# Patient Record
Sex: FEMALE | Race: WHITE | Employment: OTHER | URBAN - METROPOLITAN AREA
[De-identification: names, ages, dates, MRNs, and addresses within clinical notes are randomized per-mention and may not be internally consistent; named-entity substitution may affect disease eponyms.]

---

## 2017-05-16 ENCOUNTER — LAB ONLY (OUTPATIENT)
Dept: INTERNAL MEDICINE CLINIC | Age: 62
End: 2017-05-16

## 2017-05-16 DIAGNOSIS — Z79.4 TYPE 2 DIABETES MELLITUS WITHOUT COMPLICATION, WITH LONG-TERM CURRENT USE OF INSULIN (HCC): Primary | ICD-10-CM

## 2017-05-16 DIAGNOSIS — E11.9 TYPE 2 DIABETES MELLITUS WITHOUT COMPLICATION, WITH LONG-TERM CURRENT USE OF INSULIN (HCC): Primary | ICD-10-CM

## 2017-05-17 LAB
ALT SERPL-CCNC: 10 U/L (ref 5–40)
ANION GAP SERPL CALC-SCNC: 16 MMOL/L
AST SERPL W P-5'-P-CCNC: 10 U/L (ref 10–37)
AVG GLU, 10930: 179 MG/DL (ref 91–123)
BUN SERPL-MCNC: 21 MG/DL (ref 6–22)
CALCIUM SERPL-MCNC: 9.3 MG/DL (ref 8.4–10.5)
CHLORIDE SERPL-SCNC: 98 MMOL/L (ref 98–110)
CHOLEST SERPL-MCNC: 181 MG/DL (ref 110–200)
CO2 SERPL-SCNC: 23 MMOL/L (ref 20–32)
CREAT SERPL-MCNC: 1.1 MG/DL (ref 0.8–1.4)
GFRAA, 66117: 58.3
GFRNA, 66118: 48.1
GLUCOSE SERPL-MCNC: 153 MG/DL (ref 65–99)
HBA1C MFR BLD HPLC: 7.9 % (ref 4.8–5.9)
HDLC SERPL-MCNC: 35 MG/DL (ref 40–59)
LDLC SERPL CALC-MCNC: 120 MG/DL (ref 50–99)
POTASSIUM SERPL-SCNC: 4.8 MMOL/L (ref 3.5–5.5)
SODIUM SERPL-SCNC: 137 MMOL/L (ref 133–145)
TRIGL SERPL-MCNC: 131 MG/DL (ref 40–149)
VLDLC SERPL CALC-MCNC: 26 MG/DL (ref 8–30)

## 2017-05-18 ENCOUNTER — OFFICE VISIT (OUTPATIENT)
Dept: INTERNAL MEDICINE CLINIC | Age: 62
End: 2017-05-18

## 2017-05-18 VITALS
RESPIRATION RATE: 16 BRPM | BODY MASS INDEX: 40.22 KG/M2 | TEMPERATURE: 98.3 F | WEIGHT: 227 LBS | HEIGHT: 63 IN | HEART RATE: 58 BPM | SYSTOLIC BLOOD PRESSURE: 130 MMHG | DIASTOLIC BLOOD PRESSURE: 80 MMHG | OXYGEN SATURATION: 97 %

## 2017-05-18 DIAGNOSIS — Z00.00 ROUTINE GENERAL MEDICAL EXAMINATION AT A HEALTH CARE FACILITY: Primary | ICD-10-CM

## 2017-05-18 DIAGNOSIS — E11.9 TYPE 2 DIABETES MELLITUS WITHOUT COMPLICATION, WITH LONG-TERM CURRENT USE OF INSULIN (HCC): ICD-10-CM

## 2017-05-18 DIAGNOSIS — Z79.4 TYPE 2 DIABETES MELLITUS WITHOUT COMPLICATION, WITH LONG-TERM CURRENT USE OF INSULIN (HCC): ICD-10-CM

## 2017-05-18 DIAGNOSIS — E03.9 ACQUIRED HYPOTHYROIDISM: ICD-10-CM

## 2017-05-18 DIAGNOSIS — I10 ESSENTIAL HYPERTENSION, BENIGN: ICD-10-CM

## 2017-05-18 RX ORDER — TRAMADOL HYDROCHLORIDE 50 MG/1
TABLET ORAL
Qty: 90 TAB | Refills: 3 | Status: SHIPPED | OUTPATIENT
Start: 2017-05-18 | End: 2020-08-06

## 2017-05-18 NOTE — PROGRESS NOTES
1. Have you been to the ER, urgent care clinic since your last visit? Hospitalized since your last visit? No    2. Have you seen or consulted any other health care providers outside of the [address] [place] [address] since your last visit? Include any pap smears or colon screening.  No

## 2017-05-18 NOTE — PATIENT INSTRUCTIONS

## 2017-05-18 NOTE — PROGRESS NOTES
HPI:   Routine check up  Hx notable for HTN, T2DM, hypothyroidism  Recent A1C/chol 7.9/181  w/o chest pain/abd. discomfort; no dyspnea, cough or increased pedal edema; denies constitutional complaints of fever, night sweats or wt loss; no evidence of GI/ hemorrhage; no polyuria/polydipsia. Activity is sedentary    ROS is otherwise negative. Past Medical History:   Diagnosis Date    Breast cyst 3/2013    rt breast    Cholelithiasis     Diabetes ([facility] [address]     Endometriosis     Esophagitis, unspecified     Generalized osteoarthrosis, involving multiple sites     Gross hematuria     Hematuria, unspecified     Hypertension     Incisional hernia without mention of obstruction or gangrene     Morbid obesity ([address]     Pneumonia     Unspecified hypothyroidism     Unspecified venous (peripheral) insufficiency     Urolithiasis     Urolithiasis        Past Surgical History:   Procedure Laterality Date    BIOPSY OF BREAST, INCISIONAL  Jan 2012    right breast    HX COLONOSCOPY  4/2005    (vidhya) neg    HX HERNIA REPAIR Right     abdominal hernia, right side    HX HYSTERECTOMY  1994    HX OTHER SURGICAL      pilonidal cyst excision x 2       Social History     Social History    Marital status:      Spouse name: N/A    Number of children: N/A    Years of education: N/A     Occupational History    Not on file.      Social History Main Topics    Smoking status: Former Smoker     Types: Cigarettes     Quit date: 1/1/2013    Smokeless tobacco: Not on file    Alcohol use Yes      Comment: occasional    Drug use: No    Sexual activity: Not on file     Other Topics Concern    Not on file     Social History Narrative       Allergies   Allergen Reactions    Metformin Nausea Only       Family History   Problem Relation Age of Onset    Lung Disease Mother     Heart Attack Mother     Prostate Cancer Father 80    Heart Disease Other        Current Outpatient Prescriptions   Medication Sig Dispense Refill    guaiFENesin-codeine (CHERATUSSIN AC)  mg/5 mL solution 1 or 2 tsp q 6 hrs prn cough 120 mL 1    canagliflozin (INVOKANA) 100 mg tablet Take 1 Tab by mouth Daily (before breakfast). 30 Tab 11    insulin glargine (LANTUS) 100 unit/mL injection 60 units BID 1 Vial 3    amLODIPine-benazepril (LOTREL) 5-20 mg per capsule 2 qd 180 Cap 2    atenolol (TENORMIN) 100 mg tablet 1 qd 90 Tab 3    levothyroxine (SYNTHROID) 175 mcg tablet 1 qd 90 Tab 3    naproxen sodium (ANAPROX DS) 550 mg tablet 1 bid with food prn pain 180 Tab 3    sitaGLIPtin (JANUVIA) 100 mg tablet 1 qd 90 Tab 3    traMADol (ULTRAM) 50 mg tablet 1 tid prn pain 90 Tab 3    glipiZIDE SR (GLUCOTROL) 10 mg CR tablet 2 po qam 180 Tab 3    hydrochlorothiazide (HYDRODIURIL) 25 mg tablet 1 qam 90 Tab 3    ranitidine (ZANTAC) 300 mg tablet Take 300 mg by mouth daily. Visit Vitals    /80 (BP 1 Location: Left arm, BP Patient Position: Sitting)    Pulse (!) 58    Temp 98.3 °F (36.8 °C) (Tympanic)    Resp 16    Ht 5' 3\" (1.6 m)    Wt 227 lb (103 kg)    SpO2 97%    BMI 40.21 kg/m2       PE  Well nourished in NAD  HEENT:   OP: clear. Neck: supple w/o mass or bruits. Chest: clear. CV: RRR w/o m,r,g; pulses intact. Abd: soft, NT, w/o HSM. +Large abdominal hernia (R); smaller one (L)   Ext: tr edema. Diabetic foot exam:     Left: Reflexes 1+     Vibratory sensation normal    Proprioception normal   Sharp/dull discrimination normal    Filament test normal sensation with micro filament   Pulse DP: 2+ (normal)   Pulse PT: 2+ (normal)   Deformities: None  Right: Reflexes 1+   Vibratory sensation normal   Proprioception normal   Sharp/dull discrimination normal   Filament test normal sensation with micro filament   Pulse DP: 2+ (normal)   Pulse PT: 2+ (normal)   Deformities: None    Neuro: NF. Assessment and Plan    Encounter Diagnoses   Name Primary?     Routine general medical examination at a health care facility Yes  Type 2 diabetes mellitus without complication, with long-term current use of insulin (HCC)     Essential hypertension, benign     Acquired hypothyroidism    HTN - controlled  T2DM - continue dietary/exercise efforts  Eye exam advised; mammogram neg 10/2016; colo advised  I have reviewed/discussed the above normal BMI with the patient. I have recommended the following interventions: dietary management education, guidance, and counseling . Kendall Salinas     Hypothyroidism - continue replacement  No change in rx  OV 4 mos or prn  I have explained plan to patient and the patient verbalizes understanding

## 2017-10-19 ENCOUNTER — HOSPITAL ENCOUNTER (OUTPATIENT)
Dept: MAMMOGRAPHY | Age: 62
Discharge: HOME | End: 2017-10-19
Payer: COMMERCIAL

## 2017-10-19 DIAGNOSIS — Z12.31 VISIT FOR SCREENING MAMMOGRAM: ICD-10-CM

## 2017-10-19 PROCEDURE — 77067 SCR MAMMO BI INCL CAD: CPT

## 2017-12-12 ENCOUNTER — LAB ONLY (OUTPATIENT)
Dept: INTERNAL MEDICINE CLINIC | Age: 62
End: 2017-12-12

## 2017-12-12 DIAGNOSIS — E11.9 TYPE 2 DIABETES MELLITUS WITHOUT COMPLICATION, WITH LONG-TERM CURRENT USE OF INSULIN (HCC): ICD-10-CM

## 2017-12-12 DIAGNOSIS — I10 ESSENTIAL HYPERTENSION, BENIGN: ICD-10-CM

## 2017-12-12 DIAGNOSIS — Z79.4 TYPE 2 DIABETES MELLITUS WITHOUT COMPLICATION, WITH LONG-TERM CURRENT USE OF INSULIN (HCC): Primary | ICD-10-CM

## 2017-12-12 DIAGNOSIS — E66.01 MORBID OBESITY (HCC): ICD-10-CM

## 2017-12-12 DIAGNOSIS — E03.9 ACQUIRED HYPOTHYROIDISM: ICD-10-CM

## 2017-12-12 DIAGNOSIS — E11.9 TYPE 2 DIABETES MELLITUS WITHOUT COMPLICATION, WITH LONG-TERM CURRENT USE OF INSULIN (HCC): Primary | ICD-10-CM

## 2017-12-12 DIAGNOSIS — Z79.4 TYPE 2 DIABETES MELLITUS WITHOUT COMPLICATION, WITH LONG-TERM CURRENT USE OF INSULIN (HCC): ICD-10-CM

## 2017-12-13 LAB
A-G RATIO,AGRAT: 1.2 RATIO (ref 1.1–2.6)
ABSOLUTE LYMPHOCYTE COUNT, 10803: 2.1 K/UL (ref 1–4.8)
ALBUMIN SERPL-MCNC: 3.9 G/DL (ref 3.5–5)
ALP SERPL-CCNC: 78 U/L (ref 40–120)
ALT SERPL-CCNC: 9 U/L (ref 5–40)
ANION GAP SERPL CALC-SCNC: 17 MMOL/L
AST SERPL W P-5'-P-CCNC: 11 U/L (ref 10–37)
AVG GLU, 10930: 169 MG/DL (ref 91–123)
BASOPHILS # BLD: 0.1 K/UL (ref 0–0.2)
BASOPHILS NFR BLD: 1 % (ref 0–2)
BILIRUB SERPL-MCNC: 0.3 MG/DL (ref 0.2–1.2)
BUN SERPL-MCNC: 21 MG/DL (ref 6–22)
CALCIUM SERPL-MCNC: 9.3 MG/DL (ref 8.4–10.5)
CHLORIDE SERPL-SCNC: 98 MMOL/L (ref 98–110)
CHOLEST SERPL-MCNC: 173 MG/DL (ref 110–200)
CO2 SERPL-SCNC: 24 MMOL/L (ref 20–32)
CREAT SERPL-MCNC: 1 MG/DL (ref 0.8–1.4)
CREATININE, URINE: 99 MG/DL
EOSINOPHIL # BLD: 0.3 K/UL (ref 0–0.5)
EOSINOPHIL NFR BLD: 3 % (ref 0–6)
ERYTHROCYTE [DISTWIDTH] IN BLOOD BY AUTOMATED COUNT: 14.5 % (ref 10–16)
GFRAA, 66117: >60
GFRNA, 66118: 53.5
GLOBULIN,GLOB: 3.3 G/DL (ref 2–4)
GLUCOSE SERPL-MCNC: 164 MG/DL (ref 70–99)
GRANULOCYTES,GRANS: 69 % (ref 40–75)
HBA1C MFR BLD HPLC: 7.5 % (ref 4.8–5.9)
HCT VFR BLD AUTO: 47.7 % (ref 35.1–48)
HDLC SERPL-MCNC: 36 MG/DL (ref 40–59)
HGB BLD-MCNC: 14.7 G/DL (ref 11.7–16)
LDLC SERPL CALC-MCNC: 110 MG/DL (ref 50–99)
LYMPHOCYTES, LYMLT: 21 % (ref 27–45)
MCH RBC QN AUTO: 29 PG (ref 26–34)
MCHC RBC AUTO-ENTMCNC: 31 G/DL (ref 32–36)
MCV RBC AUTO: 95 FL (ref 80–95)
MICROALB/CREAT RATIO, 140286: 23.3 MCG/MG OF CREATININE (ref 0–30)
MICROALBUMIN,URINE RANDOM 140054: 23.1 UG/ML (ref 0.1–17)
MONOCYTES # BLD: 0.6 K/UL (ref 0.1–0.9)
MONOCYTES NFR BLD: 6 % (ref 3–9)
NEUTROPHILS # BLD AUTO: 6.6 K/UL (ref 1.8–7.7)
PLATELET # BLD AUTO: 354 K/UL (ref 140–440)
PMV BLD AUTO: 10 FL (ref 6–10.8)
POTASSIUM SERPL-SCNC: 4.9 MMOL/L (ref 3.5–5.5)
PROT SERPL-MCNC: 7.2 G/DL (ref 6.2–8.1)
RBC # BLD AUTO: 5.01 M/UL (ref 3.8–5.2)
SODIUM SERPL-SCNC: 139 MMOL/L (ref 133–145)
TRIGL SERPL-MCNC: 136 MG/DL (ref 40–149)
TSH SERPL DL<=0.005 MIU/L-ACNC: 0.03 MCU/ML (ref 0.27–4.2)
VLDLC SERPL CALC-MCNC: 27 MG/DL (ref 8–30)
WBC # BLD AUTO: 9.7 K/UL (ref 4–11)

## 2017-12-14 ENCOUNTER — OFFICE VISIT (OUTPATIENT)
Dept: INTERNAL MEDICINE CLINIC | Age: 62
End: 2017-12-14

## 2017-12-14 VITALS
RESPIRATION RATE: 16 BRPM | TEMPERATURE: 97.4 F | WEIGHT: 248 LBS | HEART RATE: 58 BPM | SYSTOLIC BLOOD PRESSURE: 138 MMHG | BODY MASS INDEX: 43.94 KG/M2 | HEIGHT: 63 IN | DIASTOLIC BLOOD PRESSURE: 80 MMHG | OXYGEN SATURATION: 98 %

## 2017-12-14 DIAGNOSIS — Z79.4 TYPE 2 DIABETES MELLITUS WITHOUT COMPLICATION, WITH LONG-TERM CURRENT USE OF INSULIN (HCC): Primary | ICD-10-CM

## 2017-12-14 DIAGNOSIS — E03.9 ACQUIRED HYPOTHYROIDISM: ICD-10-CM

## 2017-12-14 DIAGNOSIS — I10 ESSENTIAL HYPERTENSION, BENIGN: ICD-10-CM

## 2017-12-14 DIAGNOSIS — E11.9 TYPE 2 DIABETES MELLITUS WITHOUT COMPLICATION, WITH LONG-TERM CURRENT USE OF INSULIN (HCC): Primary | ICD-10-CM

## 2017-12-14 NOTE — PATIENT INSTRUCTIONS
Learning About Meal Planning for Diabetes  Why plan your meals? Meal planning can be a key part of managing diabetes. Planning meals and snacks with the right balance of carbohydrate, protein, and fat can help you keep your blood sugar at the target level you set with your doctor. You don't have to eat special foods. You can eat what your family eats, including sweets once in a while. But you do have to pay attention to how often you eat and how much you eat of certain foods. You may want to work with a dietitian or a certified diabetes educator. He or she can give you tips and meal ideas and can answer your questions about meal planning. This health professional can also help you reach a healthy weight if that is one of your goals. What plan is right for you? Your dietitian or diabetes educator may suggest that you start with the plate format or carbohydrate counting. The plate format  The plate format is a simple way to help you manage how you eat. You plan meals by learning how much space each food should take on a plate. Using the plate format helps you spread carbohydrate throughout the day. It can make it easier to keep your blood sugar level within your target range. It also helps you see if you're eating healthy portion sizes. To use the plate format, you put non-starchy vegetables on half your plate. Add meat or meat substitutes on one-quarter of the plate. Put a grain or starchy vegetable (such as brown rice or a potato) on the final quarter of the plate. You can add a small piece of fruit and some low-fat or fat-free milk or yogurt, depending on your carbohydrate goal for each meal.  Here are some tips for using the plate format:  · Make sure that you are not using an oversized plate. A 9-inch plate is best. Many restaurants use larger plates. · Get used to using the plate format at home. Then you can use it when you eat out. · Write down your questions about using the plate format.  Talk to your doctor, a dietitian, or a diabetes educator about your concerns. Carbohydrate counting  With carbohydrate counting, you plan meals based on the amount of carbohydrate in each food. Carbohydrate raises blood sugar higher and more quickly than any other nutrient. It is found in desserts, breads and cereals, and fruit. It's also found in starchy vegetables such as potatoes and corn, grains such as rice and pasta, and milk and yogurt. Spreading carbohydrate throughout the day helps keep your blood sugar levels within your target range. Your daily amount depends on several things, including your weight, how active you are, which diabetes medicines you take, and what your goals are for your blood sugar levels. A registered dietitian or diabetes educator can help you plan how much carbohydrate to include in each meal and snack. A guideline for your daily amount of carbohydrate is:  · 45 to 60 grams at each meal. That's about the same as 3 to 4 carbohydrate servings. · 15 to 20 grams at each snack. That's about the same as 1 carbohydrate serving. The Nutrition Facts label on packaged foods tells you how much carbohydrate is in a serving of the food. First, look at the serving size on the food label. Is that the amount you eat in a serving? All of the nutrition information on a food label is based on that serving size. So if you eat more or less than that, you'll need to adjust the other numbers. Total carbohydrate is the next thing you need to look for on the label. If you count carbohydrate servings, one serving of carbohydrate is 15 grams. For foods that don't come with labels, such as fresh fruits and vegetables, you'll need a guide that lists carbohydrate in these foods. Ask your doctor, dietitian, or diabetes educator about books or other nutrition guides you can use.   If you take insulin, you need to know how many grams of carbohydrate are in a meal. This lets you know how much rapid-acting insulin to take before you eat. If you use an insulin pump, you get a constant rate of insulin during the day. So the pump must be programmed at meals to give you extra insulin to cover the rise in blood sugar after meals. When you know how much carbohydrate you will eat, you can take the right amount of insulin. Or, if you always use the same amount of insulin, you need to make sure that you eat the same amount of carbohydrate at meals. If you need more help to understand carbohydrate counting and food labels, ask your doctor, dietitian, or diabetes educator. How do you get started with meal planning? Here are some tips to get started:  · Plan your meals a week at a time. Don't forget to include snacks too. · Use cookbooks or online recipes to plan several main meals. Plan some quick meals for busy nights. You also can double some recipes that freeze well. Then you can save half for other busy nights when you don't have time to cook. · Make sure you have the ingredients you need for your recipes. If you're running low on basic items, put these items on your shopping list too. · List foods that you use to make breakfasts, lunches, and snacks. List plenty of fruits and vegetables. · Post this list on the refrigerator. Add to it as you think of more things you need. · Take the list to the store to do your weekly shopping. Follow-up care is a key part of your treatment and safety. Be sure to make and go to all appointments, and call your doctor if you are having problems. It's also a good idea to know your test results and keep a list of the medicines you take. Where can you learn more? Go to http://essie-aura.info/. Babak Watson in the search box to learn more about \"Learning About Meal Planning for Diabetes. \"  Current as of: March 13, 2017  Content Version: 11.4  © 2171-4215 [employer].  Care instructions adapted under license by [facility] (which disclaims liability or warranty for this information). If you have questions about a medical condition or this instruction, always ask your healthcare professional. [address] any warranty or liability for your use of this information. Body Mass Index: Care Instructions  Your Care Instructions    Body mass index (BMI) can help you see if your weight is raising your risk for health problems. It uses a formula to compare how much you weigh with how tall you are. · A BMI lower than 18.5 is considered underweight. · A BMI between 18.5 and 24.9 is considered healthy. · A BMI between 25 and 29.9 is considered overweight. A BMI of 30 or higher is considered obese. If your BMI is in the normal range, it means that you have a lower risk for weight-related health problems. If your BMI is in the overweight or obese range, you may be at increased risk for weight-related health problems, such as high blood pressure, heart disease, stroke, arthritis or joint pain, and diabetes. If your BMI is in the underweight range, you may be at increased risk for health problems such as fatigue, lower protection (immunity) against illness, muscle loss, bone loss, hair loss, and hormone problems. BMI is just one measure of your risk for weight-related health problems. You may be at higher risk for health problems if you are not active, you eat an unhealthy diet, or you drink too much alcohol or use tobacco products. Follow-up care is a key part of your treatment and safety. Be sure to make and go to all appointments, and call your doctor if you are having problems. It's also a good idea to know your test results and keep a list of the medicines you take. How can you care for yourself at home? · Practice healthy eating habits. This includes eating plenty of fruits, vegetables, whole grains, lean protein, and low-fat dairy. · If your doctor recommends it, get more exercise. Walking is a good choice.  Bit by bit, increase the amount you walk every day. Try for at least 30 minutes on most days of the week. · Do not smoke. Smoking can increase your risk for health problems. If you need help quitting, talk to your doctor about stop-smoking programs and medicines. These can increase your chances of quitting for good. · Limit alcohol to 2 drinks a day for men and 1 drink a day for women. Too much alcohol can cause health problems. If you have a BMI higher than 25  · Your doctor may do other tests to check your risk for weight-related health problems. This may include measuring the distance around your waist. A waist measurement of more than 40 inches in men or 35 inches in women can increase the risk of weight-related health problems. · Talk with your doctor about steps you can take to stay healthy or improve your health. You may need to make lifestyle changes to lose weight and stay healthy, such as changing your diet and getting regular exercise. If you have a BMI lower than 18.5  · Your doctor may do other tests to check your risk for health problems. · Talk with your doctor about steps you can take to stay healthy or improve your health. You may need to make lifestyle changes to gain or maintain weight and stay healthy, such as getting more healthy foods in your diet and doing exercises to build muscle. Where can you learn more? Go to http://essie-aura.info/. Enter S176 in the search box to learn more about \"Body Mass Index: Care Instructions. \"  Current as of: October 13, 2016  Content Version: 11.4  © 9298-6156 [employer]. Care instructions adapted under license by [employer] (which disclaims liability or warranty for this information). If you have questions about a medical condition or this instruction, always ask your healthcare professional. [place] [address] any warranty or liability for your use of this information.

## 2017-12-14 NOTE — PROGRESS NOTES
HPI:   F/u visit for routine evaluation of HTN, T2DM, hypothyroidism  Recent A1C/chol/TSH 7.5/173/.03  w/o chest pain/abd. discomfort; no dyspnea, cough or increased pedal edema; denies constitutional complaints of fever, night sweats or wt loss; no evidence of GI/ hemorrhage; no polyuria/polydipsia. Activity is age appropriate. ROS is otherwise negative. Past Medical History:   Diagnosis Date    Breast cyst 3/2013    rt breast    Cholelithiasis     [facility] [address]     Endometriosis     Esophagitis, unspecified     Generalized osteoarthrosis, involving multiple sites     Gross hematuria     Hematuria, unspecified     Hypertension     Incisional hernia without mention of obstruction or gangrene     Morbid obesity ([facility] [address]     Pneumonia     Unspecified hypothyroidism     Unspecified venous (peripheral) insufficiency     Urolithiasis     Urolithiasis        Past Surgical History:   Procedure Laterality Date    BIOPSY OF BREAST, INCISIONAL  Jan 2012    right breast    HX COLONOSCOPY  4/2005    (vidhya) neg    HX HERNIA REPAIR Right     abdominal hernia, right side    HX HYSTERECTOMY  1994    HX OTHER SURGICAL      pilonidal cyst excision x 2       Social History     Social History    Marital status:      Spouse name: N/A    Number of children: N/A    Years of education: N/A     Occupational History    Not on file.      Social History Main Topics    Smoking status: Former Smoker     Types: Cigarettes     Quit date: 1/1/2013    Smokeless tobacco: Never Used    Alcohol use Yes      Comment: occasional    Drug use: No    Sexual activity: Not on file     Other Topics Concern    Not on file     Social History Narrative       Allergies   Allergen Reactions    Metformin Nausea Only       Family History   Problem Relation Age of Onset    Lung Disease Mother     Heart Attack Mother     Prostate Cancer Father 80    Heart Disease Other        Current Outpatient Prescriptions   Medication Sig Dispense Refill    traMADol (ULTRAM) 50 mg tablet 1 tid prn pain 90 Tab 3    guaiFENesin-codeine (CHERATUSSIN AC)  mg/5 mL solution 1 or 2 tsp q 6 hrs prn cough 120 mL 1    canagliflozin (INVOKANA) 100 mg tablet Take 1 Tab by mouth Daily (before breakfast). 30 Tab 11    insulin glargine (LANTUS) 100 unit/mL injection 60 units BID 1 Vial 3    amLODIPine-benazepril (LOTREL) 5-20 mg per capsule 2 qd 180 Cap 2    atenolol (TENORMIN) 100 mg tablet 1 qd 90 Tab 3    levothyroxine (SYNTHROID) 175 mcg tablet 1 qd 90 Tab 3    naproxen sodium (ANAPROX DS) 550 mg tablet 1 bid with food prn pain 180 Tab 3    sitaGLIPtin (JANUVIA) 100 mg tablet 1 qd 90 Tab 3    glipiZIDE SR (GLUCOTROL) 10 mg CR tablet 2 po qam 180 Tab 3    hydrochlorothiazide (HYDRODIURIL) 25 mg tablet 1 qam 90 Tab 3    ranitidine (ZANTAC) 300 mg tablet Take 300 mg by mouth daily. Visit Vitals    /80 (BP 1 Location: Left arm, BP Patient Position: Sitting)    Pulse (!) 58    Temp 97.4 °F (36.3 °C) (Tympanic)    Resp 16    Ht 5' 3\" (1.6 m)    Wt 248 lb (112.5 kg)    SpO2 98%    BMI 43.93 kg/m2       PE  Well nourished in NAD  HEENT: OP: clear. Neck: supple w/o mass or bruits. Chest: clear. CV: RRR w/o m,r,g; pulses intact. Abd: soft, NT, w/o HSM; hernias unchanged  Ext: tr edema. Neuro: NF. Assessment and Plan    Encounter Diagnoses   Name Primary?  Type 2 diabetes mellitus without complication, with long-term current use of insulin (HCC) Yes    Essential hypertension, benign     Acquired hypothyroidism    HTN - controlled  T2DM - continue dietary/exercise efforts  Hypothyroidism - on replacement  No change in rx  OV 4 mos or prn  I have explained plan to patient and the patient verbalizes understanding            Discussed the patient's BMI with her.   The BMI follow up plan is as follows:     dietary management education, guidance, and counseling  encourage exercise  monitor weight  prescribed dietary intake    An After Visit Summary was printed and given to the patient.

## 2018-03-05 ENCOUNTER — OFFICE VISIT (OUTPATIENT)
Dept: INTERNAL MEDICINE CLINIC | Age: 63
End: 2018-03-05

## 2018-03-05 VITALS
HEART RATE: 58 BPM | HEIGHT: 63 IN | DIASTOLIC BLOOD PRESSURE: 70 MMHG | RESPIRATION RATE: 16 BRPM | WEIGHT: 240 LBS | SYSTOLIC BLOOD PRESSURE: 130 MMHG | OXYGEN SATURATION: 98 % | TEMPERATURE: 98 F | BODY MASS INDEX: 42.52 KG/M2

## 2018-03-05 DIAGNOSIS — J06.9 ACUTE URI: Primary | ICD-10-CM

## 2018-03-05 DIAGNOSIS — R05.9 COUGH: ICD-10-CM

## 2018-03-05 RX ORDER — AZITHROMYCIN 250 MG/1
TABLET, FILM COATED ORAL
Qty: 6 TAB | Refills: 0 | Status: SHIPPED | OUTPATIENT
Start: 2018-03-05 | End: 2018-03-10

## 2018-03-05 RX ORDER — CODEINE PHOSPHATE AND GUAIFENESIN 10; 100 MG/5ML; MG/5ML
SOLUTION ORAL
Qty: 120 ML | Refills: 1 | Status: SHIPPED | OUTPATIENT
Start: 2018-03-05 | End: 2021-11-07

## 2018-03-05 NOTE — PATIENT INSTRUCTIONS
Upper Respiratory Infection (Cold): Care Instructions  Your Care Instructions    An upper respiratory infection, or URI, is an infection of the nose, sinuses, or throat. URIs are spread by coughs, sneezes, and direct contact. The common cold is the most frequent kind of URI. The flu and sinus infections are other kinds of URIs. Almost all URIs are caused by viruses. Antibiotics won't cure them. But you can treat most infections with home care. This may include drinking lots of fluids and taking over-the-counter pain medicine. You will probably feel better in 4 to 10 days. The doctor has checked you carefully, but problems can develop later. If you notice any problems or new symptoms, get medical treatment right away. Follow-up care is a key part of your treatment and safety. Be sure to make and go to all appointments, and call your doctor if you are having problems. It's also a good idea to know your test results and keep a list of the medicines you take. How can you care for yourself at home? · To prevent dehydration, drink plenty of fluids, enough so that your urine is light yellow or clear like water. Choose water and other caffeine-free clear liquids until you feel better. If you have kidney, heart, or liver disease and have to limit fluids, talk with your doctor before you increase the amount of fluids you drink. · Take an over-the-counter pain medicine, such as acetaminophen (Tylenol), ibuprofen (Advil, Motrin), or naproxen (Aleve). Read and follow all instructions on the label. · Before you use cough and cold medicines, check the label. These medicines may not be safe for young children or for people with certain health problems. · Be careful when taking over-the-counter cold or flu medicines and Tylenol at the same time. Many of these medicines have acetaminophen, which is Tylenol. Read the labels to make sure that you are not taking more than the recommended dose.  Too much acetaminophen (Tylenol) can be harmful. · Get plenty of rest.  · Do not smoke or allow others to smoke around you. If you need help quitting, talk to your doctor about stop-smoking programs and medicines. These can increase your chances of quitting for good. When should you call for help? Call 911 anytime you think you may need emergency care. For example, call if:  ? · You have severe trouble breathing. ?Call your doctor now or seek immediate medical care if:  ? · You seem to be getting much sicker. ? · You have new or worse trouble breathing. ? · You have a new or higher fever. ? · You have a new rash. ? Watch closely for changes in your health, and be sure to contact your doctor if:  ? · You have a new symptom, such as a sore throat, an earache, or sinus pain. ? · You cough more deeply or more often, especially if you notice more mucus or a change in the color of your mucus. ? · You do not get better as expected. Where can you learn more? Go to http://essie-aura.info/. Enter N518 in the search box to learn more about \"Upper Respiratory Infection (Cold): Care Instructions. \"  Current as of: May 12, 2017  Content Version: 11.4  © 1495-5603 [email or web address]. Care instructions adapted under license by [employer] (which disclaims liability or warranty for this information). If you have questions about a medical condition or this instruction, always ask your healthcare professional. [place] [address] any warranty or liability for your use of this information. Body Mass Index: Care Instructions  Your Care Instructions    Body mass index (BMI) can help you see if your weight is raising your risk for health problems. It uses a formula to compare how much you weigh with how tall you are. · A BMI lower than 18.5 is considered underweight. · A BMI between 18.5 and 24.9 is considered healthy. · A BMI between 25 and 29.9 is considered overweight.  A BMI of 30 or higher is considered obese. If your BMI is in the normal range, it means that you have a lower risk for weight-related health problems. If your BMI is in the overweight or obese range, you may be at increased risk for weight-related health problems, such as high blood pressure, heart disease, stroke, arthritis or joint pain, and diabetes. If your BMI is in the underweight range, you may be at increased risk for health problems such as fatigue, lower protection (immunity) against illness, muscle loss, bone loss, hair loss, and hormone problems. BMI is just one measure of your risk for weight-related health problems. You may be at higher risk for health problems if you are not active, you eat an unhealthy diet, or you drink too much alcohol or use tobacco products. Follow-up care is a key part of your treatment and safety. Be sure to make and go to all appointments, and call your doctor if you are having problems. It's also a good idea to know your test results and keep a list of the medicines you take. How can you care for yourself at home? · Practice healthy eating habits. This includes eating plenty of fruits, vegetables, whole grains, lean protein, and low-fat dairy. · If your doctor recommends it, get more exercise. Walking is a good choice. Bit by bit, increase the amount you walk every day. Try for at least 30 minutes on most days of the week. · Do not smoke. Smoking can increase your risk for health problems. If you need help quitting, talk to your doctor about stop-smoking programs and medicines. These can increase your chances of quitting for good. · Limit alcohol to 2 drinks a day for men and 1 drink a day for women. Too much alcohol can cause health problems. If you have a BMI higher than 25  · Your doctor may do other tests to check your risk for weight-related health problems.  This may include measuring the distance around your waist. A waist measurement of more than 40 inches in men or 35 inches in women can increase the risk of weight-related health problems. · Talk with your doctor about steps you can take to stay healthy or improve your health. You may need to make lifestyle changes to lose weight and stay healthy, such as changing your diet and getting regular exercise. If you have a BMI lower than 18.5  · Your doctor may do other tests to check your risk for health problems. · Talk with your doctor about steps you can take to stay healthy or improve your health. You may need to make lifestyle changes to gain or maintain weight and stay healthy, such as getting more healthy foods in your diet and doing exercises to build muscle. Where can you learn more? Go to http://essie-aura.info/. Enter S176 in the search box to learn more about \"Body Mass Index: Care Instructions. \"  Current as of: October 13, 2016  Content Version: 11.4  © 9710-5769 [employer], [employer]. Care instructions adapted under license by [employer] (which disclaims liability or warranty for this information). If you have questions about a medical condition or this instruction, always ask your healthcare professional. [address] any warranty or liability for your use of this information.

## 2018-03-05 NOTE — PROGRESS NOTES
HPI:   7 days of head congestion, facial discomfort, NP cough w/o fever, dyspnea, CP, or N      ROS is otherwise negative. Past Medical History:   Diagnosis Date    Breast cyst 3/2013    rt breast    Cholelithiasis     Diabetes ([facility] [address]     Endometriosis     Esophagitis, unspecified     Generalized osteoarthrosis, involving multiple sites     Gross hematuria     Hematuria, unspecified     Hypertension     Incisional hernia without mention of obstruction or gangrene     Morbid obesity ([facility] [address]     Pneumonia     Unspecified hypothyroidism     Unspecified venous (peripheral) insufficiency     Urolithiasis     Urolithiasis        Past Surgical History:   Procedure Laterality Date    BIOPSY OF BREAST, INCISIONAL  Jan 2012    right breast    HX COLONOSCOPY  4/2005    (vidhya) neg    HX HERNIA REPAIR Right     abdominal hernia, right side    HX HYSTERECTOMY  1994    HX OTHER SURGICAL      pilonidal cyst excision x 2       Social History     Social History    Marital status:      Spouse name: N/A    Number of children: N/A    Years of education: N/A     Occupational History    Not on file.      Social History Main Topics    Smoking status: Former Smoker     Types: Cigarettes     Quit date: 1/1/2013    Smokeless tobacco: Never Used    Alcohol use Yes      Comment: occasional    Drug use: No    Sexual activity: Not on file     Other Topics Concern    Not on file     Social History Narrative       Allergies   Allergen Reactions    Metformin Nausea Only       Family History   Problem Relation Age of Onset    Lung Disease Mother     Heart Attack Mother     Prostate Cancer Father 80    Heart Disease Other        Current Outpatient Prescriptions   Medication Sig Dispense Refill    traMADol (ULTRAM) 50 mg tablet 1 tid prn pain 90 Tab 3    guaiFENesin-codeine (CHERATUSSIN AC)  mg/5 mL solution 1 or 2 tsp q 6 hrs prn cough 120 mL 1    canagliflozin (INVOKANA) 100 mg tablet Take 1 Tab by mouth Daily (before breakfast). 30 Tab 11    insulin glargine (LANTUS) 100 unit/mL injection 60 units BID 1 Vial 3    amLODIPine-benazepril (LOTREL) 5-20 mg per capsule 2 qd 180 Cap 2    atenolol (TENORMIN) 100 mg tablet 1 qd 90 Tab 3    levothyroxine (SYNTHROID) 175 mcg tablet 1 qd 90 Tab 3    naproxen sodium (ANAPROX DS) 550 mg tablet 1 bid with food prn pain 180 Tab 3    sitaGLIPtin (JANUVIA) 100 mg tablet 1 qd 90 Tab 3    glipiZIDE SR (GLUCOTROL) 10 mg CR tablet 2 po qam 180 Tab 3    hydrochlorothiazide (HYDRODIURIL) 25 mg tablet 1 qam 90 Tab 3    ranitidine (ZANTAC) 300 mg tablet Take 300 mg by mouth daily. Visit Vitals    /70 (BP 1 Location: Left arm, BP Patient Position: Sitting)    Pulse (!) 58    Temp 98 °F (36.7 °C) (Tympanic)    Resp 16    Ht 5' 3\" (1.6 m)    Wt 240 lb (108.9 kg)    SpO2 98%    BMI 42.51 kg/m2       PE  Well nourished in NAD  HEENT:  OP: clear. TMS: clear  Neck: supple w/o mass or bruits. Chest: clear. CV: RRR w/o m,r,g; pulses intact. Abd: soft, NT, w/o HSM or mass; multiple hernias present  Ext: tr edema. Neuro: NF. Assessment and Plan    Encounter Diagnoses   Name Primary?  Acute URI Yes    Cough    sinusitis/ cough  zpak  zyrtec/rayshawn AC prn  F/U worsening or unimproved 7 days  T2DM - f/u 4/2018 or prn  I have explained plan to patient and the patient verbalizes understanding        Discussed the patient's BMI with her. The BMI follow up plan is as follows:     dietary management education, guidance, and counseling  encourage exercise  monitor weight  prescribed dietary intake    An After Visit Summary was printed and given to the patient.

## 2018-03-05 NOTE — PROGRESS NOTES
1. Have you been to the ER, urgent care clinic since your last visit? Hospitalized since your last visit? No    2. Have you seen or consulted any other health care providers outside of the [address] [place] [address] since your last visit? Include any pap smears or colon screening.  No

## 2018-07-25 RX ORDER — AMLODIPINE AND BENAZEPRIL HYDROCHLORIDE 5; 20 MG/1; MG/1
CAPSULE ORAL
Qty: 180 CAP | Refills: 2 | Status: SHIPPED | OUTPATIENT
Start: 2018-07-25 | End: 2019-04-30 | Stop reason: SDUPTHER

## 2018-07-25 NOTE — TELEPHONE ENCOUNTER
Requested Prescriptions     Pending Prescriptions Disp Refills    amLODIPine-benazepril (LOTREL) 5-20 mg per capsule 180 Cap 2     Si qd

## 2018-08-07 DIAGNOSIS — E11.9 TYPE 2 DIABETES MELLITUS WITHOUT COMPLICATION, WITH LONG-TERM CURRENT USE OF INSULIN (HCC): Primary | ICD-10-CM

## 2018-08-07 DIAGNOSIS — Z79.4 TYPE 2 DIABETES MELLITUS WITHOUT COMPLICATION, WITH LONG-TERM CURRENT USE OF INSULIN (HCC): Primary | ICD-10-CM

## 2018-08-08 DIAGNOSIS — E03.9 ACQUIRED HYPOTHYROIDISM: ICD-10-CM

## 2018-08-08 DIAGNOSIS — E11.9 TYPE 2 DIABETES MELLITUS WITHOUT COMPLICATION, WITH LONG-TERM CURRENT USE OF INSULIN (HCC): Primary | ICD-10-CM

## 2018-08-08 DIAGNOSIS — Z79.4 TYPE 2 DIABETES MELLITUS WITHOUT COMPLICATION, WITH LONG-TERM CURRENT USE OF INSULIN (HCC): Primary | ICD-10-CM

## 2018-08-09 ENCOUNTER — OFFICE VISIT (OUTPATIENT)
Dept: INTERNAL MEDICINE CLINIC | Age: 63
End: 2018-08-09

## 2018-08-09 VITALS
WEIGHT: 251 LBS | RESPIRATION RATE: 15 BRPM | TEMPERATURE: 98 F | OXYGEN SATURATION: 98 % | BODY MASS INDEX: 44.47 KG/M2 | HEART RATE: 75 BPM | HEIGHT: 63 IN | SYSTOLIC BLOOD PRESSURE: 130 MMHG | DIASTOLIC BLOOD PRESSURE: 80 MMHG

## 2018-08-09 DIAGNOSIS — E03.9 ACQUIRED HYPOTHYROIDISM: ICD-10-CM

## 2018-08-09 DIAGNOSIS — E11.9 TYPE 2 DIABETES MELLITUS WITHOUT COMPLICATION, WITH LONG-TERM CURRENT USE OF INSULIN (HCC): Primary | ICD-10-CM

## 2018-08-09 DIAGNOSIS — Z79.4 TYPE 2 DIABETES MELLITUS WITHOUT COMPLICATION, WITH LONG-TERM CURRENT USE OF INSULIN (HCC): Primary | ICD-10-CM

## 2018-08-09 DIAGNOSIS — I10 ESSENTIAL HYPERTENSION, BENIGN: ICD-10-CM

## 2018-08-09 DIAGNOSIS — E78.2 MIXED HYPERLIPIDEMIA: ICD-10-CM

## 2018-08-09 LAB
ANION GAP SERPL CALC-SCNC: 17 MMOL/L
AVG GLU, 10930: 176 MG/DL (ref 91–123)
BUN SERPL-MCNC: 18 MG/DL (ref 6–22)
CALCIUM SERPL-MCNC: 8.9 MG/DL (ref 8.4–10.5)
CHLORIDE SERPL-SCNC: 98 MMOL/L (ref 98–110)
CHOLEST SERPL-MCNC: 168 MG/DL (ref 110–200)
CO2 SERPL-SCNC: 26 MMOL/L (ref 20–32)
CREAT SERPL-MCNC: 1 MG/DL (ref 0.8–1.4)
GFRAA, 66117: >60
GFRNA, 66118: 53.3
GLUCOSE SERPL-MCNC: 129 MG/DL (ref 70–99)
HBA1C MFR BLD HPLC: 7.8 % (ref 4.8–5.9)
HDLC SERPL-MCNC: 35 MG/DL (ref 40–59)
HDLC SERPL-MCNC: 4.8 MG/DL (ref 0–5)
LDLC SERPL CALC-MCNC: 113 MG/DL (ref 50–99)
POTASSIUM SERPL-SCNC: 4.4 MMOL/L (ref 3.5–5.5)
SODIUM SERPL-SCNC: 141 MMOL/L (ref 133–145)
TRIGL SERPL-MCNC: 97 MG/DL (ref 40–149)
TSH SERPL DL<=0.005 MIU/L-ACNC: 0.15 MCU/ML (ref 0.27–4.2)
VLDLC SERPL CALC-MCNC: 19 MG/DL (ref 8–30)

## 2018-08-09 NOTE — PROGRESS NOTES
HPI:   F/u visit for routine evaluation of HTN, T2DM, hyperlipidemia, hypothyroidism  A1C/chol/TSH done recently 7.8/168/.15  w/o chest pain/abd. discomfort; no dyspnea, cough or increased pedal edema; denies constitutional complaints of fever, night sweats or wt loss; no evidence of GI/ hemorrhage. Activity is age appropriate. ROS is otherwise negative. Past Medical History:   Diagnosis Date    Breast cyst 3/2013    rt breast    Cholelithiasis     [facility] [address]     Endometriosis     Esophagitis, unspecified     Generalized osteoarthrosis, involving multiple sites     Gross hematuria     Hematuria, unspecified     Hypertension     Incisional hernia without mention of obstruction or gangrene     Morbid obesity ([facility] [address]     Pneumonia     Unspecified hypothyroidism     Unspecified venous (peripheral) insufficiency     Urolithiasis     Urolithiasis        Past Surgical History:   Procedure Laterality Date    BIOPSY OF BREAST, INCISIONAL  Jan 2012    right breast    HX COLONOSCOPY  4/2005    (vidhya) neg    HX HERNIA REPAIR Right     abdominal hernia, right side    HX HYSTERECTOMY  1994    HX OTHER SURGICAL      pilonidal cyst excision x 2       Social History     Social History    Marital status:      Spouse name: N/A    Number of children: N/A    Years of education: N/A     Occupational History    Not on file.      Social History Main Topics    Smoking status: Former Smoker     Types: Cigarettes     Quit date: 1/1/2013    Smokeless tobacco: Never Used    Alcohol use Yes      Comment: occasional    Drug use: No    Sexual activity: Not on file     Other Topics Concern    Not on file     Social History Narrative       Allergies   Allergen Reactions    Metformin Nausea Only       Family History   Problem Relation Age of Onset    Lung Disease Mother     Heart Attack Mother     Prostate Cancer Father 80    Heart Disease Other        Current Outpatient Prescriptions   Medication Sig Dispense Refill    canagliflozin (INVOKANA) 100 mg tablet Take 1 Tab by mouth Daily (before breakfast). 90 Tab 3    amLODIPine-benazepril (LOTREL) 5-20 mg per capsule 2 qd 180 Cap 2    guaiFENesin-codeine (CHERATUSSIN AC) 100-10 mg/5 mL solution 1 or 2 tsp q 6 hrs prn cough 120 mL 1    traMADol (ULTRAM) 50 mg tablet 1 tid prn pain 90 Tab 3    insulin glargine (LANTUS) 100 unit/mL injection 60 units BID 1 Vial 3    atenolol (TENORMIN) 100 mg tablet 1 qd 90 Tab 3    levothyroxine (SYNTHROID) 175 mcg tablet 1 qd 90 Tab 3    naproxen sodium (ANAPROX DS) 550 mg tablet 1 bid with food prn pain 180 Tab 3    sitaGLIPtin (JANUVIA) 100 mg tablet 1 qd 90 Tab 3    glipiZIDE SR (GLUCOTROL) 10 mg CR tablet 2 po qam 180 Tab 3    hydrochlorothiazide (HYDRODIURIL) 25 mg tablet 1 qam 90 Tab 3    ranitidine (ZANTAC) 300 mg tablet Take 300 mg by mouth daily. Visit Vitals    /80    Pulse 75    Temp 98 °F (36.7 °C)    Resp 15    Ht 5' 3\" (1.6 m)    Wt 251 lb (113.9 kg)    SpO2 98%    BMI 44.46 kg/m2       PE  Well nourished in NAD  HEENT:   OP: clear. Neck: supple w/o mass or bruits. Chest: clear. CV: RRR w/o m,r,g; pulses intact. Abd: soft, NT, w/o HSM; multiple abd wall hernias  Ext: tr edema. Neuro: NF. Assessment and Plan    Encounter Diagnoses   Name Primary?  Type 2 diabetes mellitus without complication, with long-term current use of insulin (HCC) Yes    Essential hypertension, benign     Acquired hypothyroidism     Mixed hyperlipidemia    HTN - controlled  T2DM/hyperlipidemia - continue dietary/exercise efforts; declines statin rx  Hypothyroidism - on replacement  Otherwise no change in rx  OV 4 mos or prn  I have explained plan to patient and the patient verbalizes understanding              Discussed the patient's BMI with her.   The BMI follow up plan is as follows:     dietary management education, guidance, and counseling  encourage exercise  monitor weight  prescribed dietary intake    An After Visit Summary was printed and given to the patient.

## 2018-08-09 NOTE — PATIENT INSTRUCTIONS
Learning About Meal Planning for Diabetes  Why plan your meals? Meal planning can be a key part of managing diabetes. Planning meals and snacks with the right balance of carbohydrate, protein, and fat can help you keep your blood sugar at the target level you set with your doctor. You don't have to eat special foods. You can eat what your family eats, including sweets once in a while. But you do have to pay attention to how often you eat and how much you eat of certain foods. You may want to work with a dietitian or a certified diabetes educator. He or she can give you tips and meal ideas and can answer your questions about meal planning. This health professional can also help you reach a healthy weight if that is one of your goals. What plan is right for you? Your dietitian or diabetes educator may suggest that you start with the plate format or carbohydrate counting. The plate format  The plate format is a simple way to help you manage how you eat. You plan meals by learning how much space each food should take on a plate. Using the plate format helps you spread carbohydrate throughout the day. It can make it easier to keep your blood sugar level within your target range. It also helps you see if you're eating healthy portion sizes. To use the plate format, you put non-starchy vegetables on half your plate. Add meat or meat substitutes on one-quarter of the plate. Put a grain or starchy vegetable (such as brown rice or a potato) on the final quarter of the plate. You can add a small piece of fruit and some low-fat or fat-free milk or yogurt, depending on your carbohydrate goal for each meal.  Here are some tips for using the plate format:  · Make sure that you are not using an oversized plate. A 9-inch plate is best. Many restaurants use larger plates. · Get used to using the plate format at home. Then you can use it when you eat out. · Write down your questions about using the plate format.  Talk to your doctor, a dietitian, or a diabetes educator about your concerns. Carbohydrate counting  With carbohydrate counting, you plan meals based on the amount of carbohydrate in each food. Carbohydrate raises blood sugar higher and more quickly than any other nutrient. It is found in desserts, breads and cereals, and fruit. It's also found in starchy vegetables such as potatoes and corn, grains such as rice and pasta, and milk and yogurt. Spreading carbohydrate throughout the day helps keep your blood sugar levels within your target range. Your daily amount depends on several things, including your weight, how active you are, which diabetes medicines you take, and what your goals are for your blood sugar levels. A registered dietitian or diabetes educator can help you plan how much carbohydrate to include in each meal and snack. A guideline for your daily amount of carbohydrate is:  · 45 to 60 grams at each meal. That's about the same as 3 to 4 carbohydrate servings. · 15 to 20 grams at each snack. That's about the same as 1 carbohydrate serving. The Nutrition Facts label on packaged foods tells you how much carbohydrate is in a serving of the food. First, look at the serving size on the food label. Is that the amount you eat in a serving? All of the nutrition information on a food label is based on that serving size. So if you eat more or less than that, you'll need to adjust the other numbers. Total carbohydrate is the next thing you need to look for on the label. If you count carbohydrate servings, one serving of carbohydrate is 15 grams. For foods that don't come with labels, such as fresh fruits and vegetables, you'll need a guide that lists carbohydrate in these foods. Ask your doctor, dietitian, or diabetes educator about books or other nutrition guides you can use.   If you take insulin, you need to know how many grams of carbohydrate are in a meal. This lets you know how much rapid-acting insulin to take before you eat. If you use an insulin pump, you get a constant rate of insulin during the day. So the pump must be programmed at meals to give you extra insulin to cover the rise in blood sugar after meals. When you know how much carbohydrate you will eat, you can take the right amount of insulin. Or, if you always use the same amount of insulin, you need to make sure that you eat the same amount of carbohydrate at meals. If you need more help to understand carbohydrate counting and food labels, ask your doctor, dietitian, or diabetes educator. How do you get started with meal planning? Here are some tips to get started:  · Plan your meals a week at a time. Don't forget to include snacks too. · Use cookbooks or online recipes to plan several main meals. Plan some quick meals for busy nights. You also can double some recipes that freeze well. Then you can save half for other busy nights when you don't have time to cook. · Make sure you have the ingredients you need for your recipes. If you're running low on basic items, put these items on your shopping list too. · List foods that you use to make breakfasts, lunches, and snacks. List plenty of fruits and vegetables. · Post this list on the refrigerator. Add to it as you think of more things you need. · Take the list to the store to do your weekly shopping. Follow-up care is a key part of your treatment and safety. Be sure to make and go to all appointments, and call your doctor if you are having problems. It's also a good idea to know your test results and keep a list of the medicines you take. Where can you learn more? Go to http://essie-aura.info/. Remy Guerrero in the search box to learn more about \"Learning About Meal Planning for Diabetes. \"  Current as of: December 7, 2017  Content Version: 11.7  © 1073-5799 [employer].  Care instructions adapted under license by [employer] (which disclaims liability or warranty for this information). If you have questions about a medical condition or this instruction, always ask your healthcare professional. [address] any warranty or liability for your use of this information. Body Mass Index: Care Instructions  Your Care Instructions    Body mass index (BMI) can help you see if your weight is raising your risk for health problems. It uses a formula to compare how much you weigh with how tall you are. · A BMI lower than 18.5 is considered underweight. · A BMI between 18.5 and 24.9 is considered healthy. · A BMI between 25 and 29.9 is considered overweight. A BMI of 30 or higher is considered obese. If your BMI is in the normal range, it means that you have a lower risk for weight-related health problems. If your BMI is in the overweight or obese range, you may be at increased risk for weight-related health problems, such as high blood pressure, heart disease, stroke, arthritis or joint pain, and diabetes. If your BMI is in the underweight range, you may be at increased risk for health problems such as fatigue, lower protection (immunity) against illness, muscle loss, bone loss, hair loss, and hormone problems. BMI is just one measure of your risk for weight-related health problems. You may be at higher risk for health problems if you are not active, you eat an unhealthy diet, or you drink too much alcohol or use tobacco products. Follow-up care is a key part of your treatment and safety. Be sure to make and go to all appointments, and call your doctor if you are having problems. It's also a good idea to know your test results and keep a list of the medicines you take. How can you care for yourself at home? · Practice healthy eating habits. This includes eating plenty of fruits, vegetables, whole grains, lean protein, and low-fat dairy. · If your doctor recommends it, get more exercise. Walking is a good choice.  Bit by bit, increase the amount you walk every day. Try for at least 30 minutes on most days of the week. · Do not smoke. Smoking can increase your risk for health problems. If you need help quitting, talk to your doctor about stop-smoking programs and medicines. These can increase your chances of quitting for good. · Limit alcohol to 2 drinks a day for men and 1 drink a day for women. Too much alcohol can cause health problems. If you have a BMI higher than 25  · Your doctor may do other tests to check your risk for weight-related health problems. This may include measuring the distance around your waist. A waist measurement of more than 40 inches in men or 35 inches in women can increase the risk of weight-related health problems. · Talk with your doctor about steps you can take to stay healthy or improve your health. You may need to make lifestyle changes to lose weight and stay healthy, such as changing your diet and getting regular exercise. If you have a BMI lower than 18.5  · Your doctor may do other tests to check your risk for health problems. · Talk with your doctor about steps you can take to stay healthy or improve your health. You may need to make lifestyle changes to gain or maintain weight and stay healthy, such as getting more healthy foods in your diet and doing exercises to build muscle. Where can you learn more? Go to http://essie-aura.info/. Enter S176 in the search box to learn more about \"Body Mass Index: Care Instructions. \"  Current as of: October 13, 2016  Content Version: 11.4  © 1326-6536 [employer]. Care instructions adapted under license by [employer] (which disclaims liability or warranty for this information). If you have questions about a medical condition or this instruction, always ask your healthcare professional. [place] [address] any warranty or liability for your use of this information.

## 2018-08-21 RX ORDER — SITAGLIPTIN 100 MG/1
TABLET, FILM COATED ORAL
Qty: 90 TAB | Refills: 2 | Status: SHIPPED | OUTPATIENT
Start: 2018-08-21 | End: 2019-05-23 | Stop reason: SDUPTHER

## 2018-08-23 RX ORDER — HYDROCHLOROTHIAZIDE 25 MG/1
TABLET ORAL
Qty: 90 TAB | Refills: 2 | Status: SHIPPED | OUTPATIENT
Start: 2018-08-23 | End: 2019-04-30 | Stop reason: SDUPTHER

## 2018-08-29 RX ORDER — LEVOTHYROXINE SODIUM 175 UG/1
TABLET ORAL
Qty: 90 TAB | Refills: 2 | Status: SHIPPED | OUTPATIENT
Start: 2018-08-29 | End: 2019-06-12 | Stop reason: SDUPTHER

## 2018-09-07 RX ORDER — INSULIN GLARGINE 100 [IU]/ML
INJECTION, SOLUTION SUBCUTANEOUS
Qty: 11 VIAL | Refills: 3 | Status: SHIPPED | OUTPATIENT
Start: 2018-09-07 | End: 2019-09-11 | Stop reason: SDUPTHER

## 2018-11-16 RX ORDER — GLIPIZIDE 10 MG/1
TABLET, FILM COATED, EXTENDED RELEASE ORAL
Qty: 180 TAB | Refills: 3 | Status: SHIPPED | OUTPATIENT
Start: 2018-11-16 | End: 2019-11-07 | Stop reason: SDUPTHER

## 2018-12-02 NOTE — PROGRESS NOTES
1. Have you been to the ER, urgent care clinic since your last visit? Hospitalized since your last visit? No    2. Have you seen or consulted any other health care providers outside of the [address] [place] [address] since your last visit? Include any pap smears or colon screening.  No  used

## 2019-02-28 RX ORDER — ATENOLOL 100 MG/1
TABLET ORAL
Qty: 90 TAB | Refills: 3 | Status: SHIPPED | OUTPATIENT
Start: 2019-02-28 | End: 2020-03-01

## 2019-02-28 RX ORDER — NAPROXEN SODIUM 550 MG/1
TABLET ORAL
Qty: 60 TAB | Refills: 5 | Status: SHIPPED | OUTPATIENT
Start: 2019-02-28

## 2019-03-05 DIAGNOSIS — E03.9 ACQUIRED HYPOTHYROIDISM: ICD-10-CM

## 2019-03-05 DIAGNOSIS — E11.9 TYPE 2 DIABETES MELLITUS WITHOUT COMPLICATION, WITH LONG-TERM CURRENT USE OF INSULIN (HCC): Primary | ICD-10-CM

## 2019-03-05 DIAGNOSIS — Z79.4 TYPE 2 DIABETES MELLITUS WITHOUT COMPLICATION, WITH LONG-TERM CURRENT USE OF INSULIN (HCC): Primary | ICD-10-CM

## 2019-03-06 NOTE — PROGRESS NOTES
HPI:   Check up  F/u visit for routine evaluation of HTN, T2DM, hyperlipidemia, hypothyroidism  A1C/chol 7.8/168 with TSH of .15 August 2018  No acute issues  w/o chest pain/abd. discomfort; no dyspnea, cough or pedal edema; denies constitutional complaints of fever, night sweats; mild volitional wt loss; no evidence of GI/ hemorrhage    ROS is otherwise negative. Past Medical History:   Diagnosis Date    Breast cyst 3/2013    rt breast    Cholelithiasis     Diabetes ([facility] [address]     Endometriosis     Esophagitis, unspecified     Generalized osteoarthrosis, involving multiple sites     Gross hematuria     Hematuria, unspecified     Hypertension     Incisional hernia without mention of obstruction or gangrene     Morbid obesity ([facility] [address]     Pneumonia     Unspecified hypothyroidism     Unspecified venous (peripheral) insufficiency     Urolithiasis     Urolithiasis        Past Surgical History:   Procedure Laterality Date    BIOPSY OF BREAST, INCISIONAL  2012    right breast    HX COLONOSCOPY  2005    (vidhya) neg    HX HERNIA REPAIR Right     abdominal hernia, right side    HX HYSTERECTOMY      HX OTHER SURGICAL      pilonidal cyst excision x 2       Social History     Socioeconomic History    Marital status:      Spouse name: Not on file    Number of children: Not on file    Years of education: Not on file    Highest education level: Not on file   Social Needs    Financial resource strain: Not on file    Food insecurity - worry: Not on file    Food insecurity - inability: Not on file   Tallyfy needs - medical: Not on file   Tallyfy needs - non-medical: Not on file   Occupational History    Not on file   Tobacco Use    Smoking status: Former Smoker     Types: Cigarettes     Last attempt to quit: 2013     Years since quittin.1    Smokeless tobacco: Never Used   Substance and Sexual Activity    Alcohol use: Yes     Comment: occasional    Drug use:  No  Sexual activity: Not on file   Other Topics Concern    Not on file   Social History Narrative    Not on file       Allergies   Allergen Reactions    Metformin Nausea Only       Family History   Problem Relation Age of Onset    Lung Disease Mother     Heart Attack Mother     Prostate Cancer Father 80    Heart Disease Other        Current Outpatient Medications   Medication Sig Dispense Refill    atenolol (TENORMIN) 100 mg tablet TAKE 1 TABLET BY MOUTH ONCE DAILY 90 Tab 3    naproxen sodium (ANAPROX) 550 mg tablet TAKE 1 TABLET BY MOUTH TWICE DAILY AS NEEDED FOR PAIN 60 Tab 5    glipiZIDE SR (GLUCOTROL XL) 10 mg CR tablet TAKE TWO TABLETS BY MOUTH ONCE DAILY IN THE MORNING 180 Tab 3    insulin glargine (LANTUS U-100 INSULIN) 100 unit/mL injection 60 units BID 11 Vial 3    levothyroxine (SYNTHROID) 175 mcg tablet TAKE ONE TABLET BY MOUTH ONCE DAILY 90 Tab 2    hydroCHLOROthiazide (HYDRODIURIL) 25 mg tablet TAKE ONE TABLET BY MOUTH ONCE DAILY IN THE MORNING 90 Tab 2    JANUVIA 100 mg tablet TAKE ONE TABLET BY MOUTH ONCE DAILY 90 Tab 2    canagliflozin (INVOKANA) 100 mg tablet Take 1 Tab by mouth Daily (before breakfast). 90 Tab 3    amLODIPine-benazepril (LOTREL) 5-20 mg per capsule 2 qd 180 Cap 2    guaiFENesin-codeine (CHERATUSSIN AC) 100-10 mg/5 mL solution 1 or 2 tsp q 6 hrs prn cough 120 mL 1    traMADol (ULTRAM) 50 mg tablet 1 tid prn pain 90 Tab 3    ranitidine (ZANTAC) 300 mg tablet Take 300 mg by mouth daily. Visit Vitals  /80   Pulse 80   Temp 98 °F (36.7 °C)   Resp 15   Ht 5' 3\" (1.6 m)   BMI 44.46 kg/m²       PE  Well nourished in NAD  HEENT:   OP: clear. Neck: supple w/o mass or bruits. Chest: clear. CV: RRR w/o m,r,g; pulses intact. Abd: soft, NT, w/o HSM; multiple abd wall hernias  Ext: tr edema. Neuro: NF. Assessment and Plan    Encounter Diagnoses   Name Primary?     Routine general medical examination at a health care facility Yes    Type 2 diabetes mellitus without complication, with long-term current use of insulin ([address]     Essential hypertension, benign     Mixed hyperlipidemia     Acquired hypothyroidism        BP @ goal  Labs to assess metabolic parameters (D1BX/FYHOCMKNCXMKXE/HVBAVERCHCUCEH)  Up to date with eye exams  Continue dietary/exercise efforts; prevnar advised  No change in rx  OV 4 mos or prn  I have explained plan to patient and the patient verbalizes understanding      Discussed the patient's BMI with her. The BMI follow up plan is as follows:     dietary management education, guidance, and counseling  encourage exercise  monitor weight  prescribed dietary intake    An After Visit Summary was printed and given to the patient.

## 2019-03-07 ENCOUNTER — OFFICE VISIT (OUTPATIENT)
Dept: INTERNAL MEDICINE CLINIC | Age: 64
End: 2019-03-07

## 2019-03-07 VITALS
HEART RATE: 80 BPM | HEIGHT: 63 IN | BODY MASS INDEX: 43.23 KG/M2 | TEMPERATURE: 98 F | RESPIRATION RATE: 15 BRPM | WEIGHT: 244 LBS | SYSTOLIC BLOOD PRESSURE: 130 MMHG | DIASTOLIC BLOOD PRESSURE: 80 MMHG

## 2019-03-07 DIAGNOSIS — E03.9 ACQUIRED HYPOTHYROIDISM: ICD-10-CM

## 2019-03-07 DIAGNOSIS — E11.9 TYPE 2 DIABETES MELLITUS WITHOUT COMPLICATION, WITH LONG-TERM CURRENT USE OF INSULIN (HCC): ICD-10-CM

## 2019-03-07 DIAGNOSIS — Z79.4 TYPE 2 DIABETES MELLITUS WITHOUT COMPLICATION, WITH LONG-TERM CURRENT USE OF INSULIN (HCC): ICD-10-CM

## 2019-03-07 DIAGNOSIS — Z00.00 ROUTINE GENERAL MEDICAL EXAMINATION AT A HEALTH CARE FACILITY: Primary | ICD-10-CM

## 2019-03-07 DIAGNOSIS — E78.2 MIXED HYPERLIPIDEMIA: ICD-10-CM

## 2019-03-07 DIAGNOSIS — I10 ESSENTIAL HYPERTENSION, BENIGN: ICD-10-CM

## 2019-03-07 LAB
A-G RATIO,AGRAT: 1.5 RATIO (ref 1.1–2.6)
ABSOLUTE LYMPHOCYTE COUNT, 10803: 2.1 K/UL (ref 1–4.8)
ALBUMIN SERPL-MCNC: 4.3 G/DL (ref 3.5–5)
ALP SERPL-CCNC: 88 U/L (ref 40–120)
ALT SERPL-CCNC: 11 U/L (ref 5–40)
ANION GAP SERPL CALC-SCNC: 17 MMOL/L
AST SERPL W P-5'-P-CCNC: 6 U/L (ref 10–37)
AVG GLU, 10930: 167 MG/DL (ref 91–123)
BASOPHILS # BLD: 0.1 K/UL (ref 0–0.2)
BASOPHILS NFR BLD: 1 % (ref 0–2)
BILIRUB SERPL-MCNC: 0.5 MG/DL (ref 0.2–1.2)
BUN SERPL-MCNC: 20 MG/DL (ref 6–22)
CALCIUM SERPL-MCNC: 9.4 MG/DL (ref 8.4–10.5)
CHLORIDE SERPL-SCNC: 96 MMOL/L (ref 98–110)
CHOLEST SERPL-MCNC: 182 MG/DL (ref 110–200)
CO2 SERPL-SCNC: 24 MMOL/L (ref 20–32)
CREAT SERPL-MCNC: 0.9 MG/DL (ref 0.8–1.4)
CREATININE, URINE: 80 MG/DL
EOSINOPHIL # BLD: 0.4 K/UL (ref 0–0.5)
EOSINOPHIL NFR BLD: 4 % (ref 0–6)
ERYTHROCYTE [DISTWIDTH] IN BLOOD BY AUTOMATED COUNT: 13.9 % (ref 10–15.5)
GFRAA, 66117: >60
GFRNA, 66118: 59
GLOBULIN,GLOB: 2.9 G/DL (ref 2–4)
GLUCOSE SERPL-MCNC: 112 MG/DL (ref 70–99)
GRANULOCYTES,GRANS: 70 % (ref 40–75)
HBA1C MFR BLD HPLC: 7.4 % (ref 4.8–5.9)
HCT VFR BLD AUTO: 47.2 % (ref 35.1–48.3)
HDLC SERPL-MCNC: 32 MG/DL (ref 40–59)
HDLC SERPL-MCNC: 5.7 MG/DL (ref 0–5)
HGB BLD-MCNC: 15.6 G/DL (ref 11.7–16.1)
LDLC SERPL CALC-MCNC: 122 MG/DL (ref 50–99)
LYMPHOCYTES, LYMLT: 20 % (ref 20–45)
MCH RBC QN AUTO: 30 PG (ref 26–34)
MCHC RBC AUTO-ENTMCNC: 33 G/DL (ref 31–36)
MCV RBC AUTO: 91 FL (ref 80–95)
MICROALB/CREAT RATIO, 140286: 53 MCG/MG OF CREATININE (ref 0–30)
MICROALBUMIN,URINE RANDOM 140054: 42.4 MCG/ML (ref 0.1–17)
MONOCYTES # BLD: 0.6 K/UL (ref 0.1–1)
MONOCYTES NFR BLD: 5 % (ref 3–12)
NEUTROPHILS # BLD AUTO: 7.3 K/UL (ref 1.8–7.7)
PLATELET # BLD AUTO: 377 K/UL (ref 140–440)
PMV BLD AUTO: 9.9 FL (ref 9–13)
POTASSIUM SERPL-SCNC: 4.1 MMOL/L (ref 3.5–5.5)
PROT SERPL-MCNC: 7.2 G/DL (ref 6.2–8.1)
RBC # BLD AUTO: 5.21 M/UL (ref 3.8–5.2)
SODIUM SERPL-SCNC: 137 MMOL/L (ref 133–145)
TRIGL SERPL-MCNC: 138 MG/DL (ref 40–149)
TSH SERPL DL<=0.005 MIU/L-ACNC: 0.08 MCU/ML (ref 0.27–4.2)
VLDLC SERPL CALC-MCNC: 28 MG/DL (ref 8–30)
WBC # BLD AUTO: 10.3 K/UL (ref 4–11)

## 2019-03-07 NOTE — PATIENT INSTRUCTIONS
Learning About Diabetes Food Guidelines  Your Care Instructions    Meal planning is important to manage diabetes. It helps keep your blood sugar at a target level (which you set with your doctor). You don't have to eat special foods. You can eat what your family eats, including sweets once in a while. But you do have to pay attention to how often you eat and how much you eat of certain foods. You may want to work with a dietitian or a certified diabetes educator (CDE) to help you plan meals and snacks. A dietitian or CDE can also help you lose weight if that is one of your goals. What should you know about eating carbs? Managing the amount of carbohydrate (carbs) you eat is an important part of healthy meals when you have diabetes. Carbohydrate is found in many foods. · Learn which foods have carbs. And learn the amounts of carbs in different foods. ? Bread, cereal, pasta, and rice have about 15 grams of carbs in a serving. A serving is 1 slice of bread (1 ounce), ½ cup of cooked cereal, or 1/3 cup of cooked pasta or rice. ? Fruits have 15 grams of carbs in a serving. A serving is 1 small fresh fruit, such as an apple or orange; ½ of a banana; ½ cup of cooked or canned fruit; ½ cup of fruit juice; 1 cup of melon or raspberries; or 2 tablespoons of dried fruit. ? Milk and no-sugar-added yogurt have 15 grams of carbs in a serving. A serving is 1 cup of milk or 2/3 cup of no-sugar-added yogurt. ? Starchy vegetables have 15 grams of carbs in a serving. A serving is ½ cup of mashed potatoes or sweet potato; 1 cup winter squash; ½ of a small baked potato; ½ cup of cooked beans; or ½ cup cooked corn or green peas. · Learn how much carbs to eat each day and at each meal. A dietitian or CDE can teach you how to keep track of the amount of carbs you eat. This is called carbohydrate counting. · If you are not sure how to count carbohydrate grams, use the Plate Method to plan meals.  It is a good, quick way to make sure that you have a balanced meal. It also helps you spread carbs throughout the day. ? Divide your plate by types of foods. Put non-starchy vegetables on half the plate, meat or other protein food on one-quarter of the plate, and a grain or starchy vegetable in the final quarter of the plate. To this you can add a small piece of fruit and 1 cup of milk or yogurt, depending on how many carbs you are supposed to eat at a meal.  · Try to eat about the same amount of carbs at each meal. Do not \"save up\" your daily allowance of carbs to eat at one meal.  · Proteins have very little or no carbs per serving. Examples of proteins are beef, chicken, turkey, fish, eggs, tofu, cheese, cottage cheese, and peanut butter. A serving size of meat is 3 ounces, which is about the size of a deck of cards. Examples of meat substitute serving sizes (equal to 1 ounce of meat) are 1/4 cup of cottage cheese, 1 egg, 1 tablespoon of peanut butter, and ½ cup of tofu. How can you eat out and still eat healthy? · Learn to estimate the serving sizes of foods that have carbohydrate. If you measure food at home, it will be easier to estimate the amount in a serving of restaurant food. · If the meal you order has too much carbohydrate (such as potatoes, corn, or baked beans), ask to have a low-carbohydrate food instead. Ask for a salad or green vegetables. · If you use insulin, check your blood sugar before and after eating out to help you plan how much to eat in the future. · If you eat more carbohydrate at a meal than you had planned, take a walk or do other exercise. This will help lower your blood sugar. What else should you know? · Limit saturated fat, such as the fat from meat and dairy products. This is a healthy choice because people who have diabetes are at higher risk of heart disease. So choose lean cuts of meat and nonfat or low-fat dairy products.  Use olive or canola oil instead of butter or shortening when cooking. · Don't skip meals. Your blood sugar may drop too low if you skip meals and take insulin or certain medicines for diabetes. · Check with your doctor before you drink alcohol. Alcohol can cause your blood sugar to drop too low. Alcohol can also cause a bad reaction if you take certain diabetes medicines. Follow-up care is a key part of your treatment and safety. Be sure to make and go to all appointments, and call your doctor if you are having problems. It's also a good idea to know your test results and keep a list of the medicines you take. Where can you learn more? Go to http://essie-aura.info/. Enter J961 in the search box to learn more about \"Learning About Diabetes Food Guidelines. \"  Current as of: July 25, 2018  Content Version: 11.9  © 4166-6872 [employer]. Care instructions adapted under license by [employer] (which disclaims liability or warranty for this information). If you have questions about a medical condition or this instruction, always ask your healthcare professional. [address] any warranty or liability for your use of this information. Body Mass Index: Care Instructions  Your Care Instructions    Body mass index (BMI) can help you see if your weight is raising your risk for health problems. It uses a formula to compare how much you weigh with how tall you are. · A BMI lower than 18.5 is considered underweight. · A BMI between 18.5 and 24.9 is considered healthy. · A BMI between 25 and 29.9 is considered overweight. A BMI of 30 or higher is considered obese. If your BMI is in the normal range, it means that you have a lower risk for weight-related health problems. If your BMI is in the overweight or obese range, you may be at increased risk for weight-related health problems, such as high blood pressure, heart disease, stroke, arthritis or joint pain, and diabetes.  If your BMI is in the underweight range, you may be at increased risk for health problems such as fatigue, lower protection (immunity) against illness, muscle loss, bone loss, hair loss, and hormone problems. BMI is just one measure of your risk for weight-related health problems. You may be at higher risk for health problems if you are not active, you eat an unhealthy diet, or you drink too much alcohol or use tobacco products. Follow-up care is a key part of your treatment and safety. Be sure to make and go to all appointments, and call your doctor if you are having problems. It's also a good idea to know your test results and keep a list of the medicines you take. How can you care for yourself at home? · Practice healthy eating habits. This includes eating plenty of fruits, vegetables, whole grains, lean protein, and low-fat dairy. · If your doctor recommends it, get more exercise. Walking is a good choice. Bit by bit, increase the amount you walk every day. Try for at least 30 minutes on most days of the week. · Do not smoke. Smoking can increase your risk for health problems. If you need help quitting, talk to your doctor about stop-smoking programs and medicines. These can increase your chances of quitting for good. · Limit alcohol to 2 drinks a day for men and 1 drink a day for women. Too much alcohol can cause health problems. If you have a BMI higher than 25  · Your doctor may do other tests to check your risk for weight-related health problems. This may include measuring the distance around your waist. A waist measurement of more than 40 inches in men or 35 inches in women can increase the risk of weight-related health problems. · Talk with your doctor about steps you can take to stay healthy or improve your health. You may need to make lifestyle changes to lose weight and stay healthy, such as changing your diet and getting regular exercise.   If you have a BMI lower than 18.5  · Your doctor may do other tests to check your risk for health problems. · Talk with your doctor about steps you can take to stay healthy or improve your health. You may need to make lifestyle changes to gain or maintain weight and stay healthy, such as getting more healthy foods in your diet and doing exercises to build muscle. Where can you learn more? Go to http://essie-aura.info/. Enter S176 in the search box to learn more about \"Body Mass Index: Care Instructions. \"  Current as of: October 13, 2016  Content Version: 11.4  © 9109-5906 [employer]. Care instructions adapted under license by [employer] (which disclaims liability or warranty for this information). If you have questions about a medical condition or this instruction, always ask your healthcare professional. [address] any warranty or liability for your use of this information.

## 2019-04-30 RX ORDER — HYDROCHLOROTHIAZIDE 25 MG/1
TABLET ORAL
Qty: 90 TAB | Refills: 2 | Status: SHIPPED | OUTPATIENT
Start: 2019-04-30 | End: 2020-02-20

## 2019-04-30 RX ORDER — AMLODIPINE AND BENAZEPRIL HYDROCHLORIDE 5; 20 MG/1; MG/1
CAPSULE ORAL
Qty: 180 CAP | Refills: 2 | Status: SHIPPED | OUTPATIENT
Start: 2019-04-30 | End: 2019-05-22

## 2019-05-21 ENCOUNTER — TELEPHONE (OUTPATIENT)
Dept: FAMILY MEDICINE CLINIC | Age: 64
End: 2019-05-21

## 2019-05-21 NOTE — TELEPHONE ENCOUNTER
Pt needs prior auth for med Lotrel since changing over to [facility].    Phone to [facility] is [phone]

## 2019-05-22 RX ORDER — AMLODIPINE BESYLATE 10 MG/1
10 TABLET ORAL DAILY
Qty: 90 TAB | Refills: 3 | Status: SHIPPED | OUTPATIENT
Start: 2019-05-22 | End: 2020-05-21

## 2019-05-22 RX ORDER — BENAZEPRIL HYDROCHLORIDE 40 MG/1
TABLET ORAL
Qty: 90 TAB | Refills: 3 | Status: SHIPPED | OUTPATIENT
Start: 2019-05-22 | End: 2020-05-21

## 2019-05-22 NOTE — TELEPHONE ENCOUNTER
Spoke with insurance company and they stated that they would not cover lotrel 2 tab but will cover the amlodipine and benazepril separately.  New order written

## 2019-05-23 RX ORDER — SITAGLIPTIN 100 MG/1
TABLET, FILM COATED ORAL
Qty: 90 TAB | Refills: 2 | Status: SHIPPED | OUTPATIENT
Start: 2019-05-23 | End: 2020-02-13

## 2019-06-12 ENCOUNTER — HOSPITAL ENCOUNTER (OUTPATIENT)
Dept: MAMMOGRAPHY | Age: 64
Discharge: HOME | End: 2019-06-12
Payer: MEDICARE

## 2019-06-12 DIAGNOSIS — Z12.31 VISIT FOR SCREENING MAMMOGRAM: ICD-10-CM

## 2019-06-12 PROCEDURE — 77063 BREAST TOMOSYNTHESIS BI: CPT

## 2019-06-12 RX ORDER — LEVOTHYROXINE SODIUM 175 UG/1
TABLET ORAL
Qty: 90 TAB | Refills: 2 | Status: SHIPPED | OUTPATIENT
Start: 2019-06-12 | End: 2020-03-17 | Stop reason: SDUPTHER

## 2019-07-18 DIAGNOSIS — E03.9 ACQUIRED HYPOTHYROIDISM: Primary | ICD-10-CM

## 2019-07-18 DIAGNOSIS — Z79.4 TYPE 2 DIABETES MELLITUS WITHOUT COMPLICATION, WITH LONG-TERM CURRENT USE OF INSULIN (HCC): Primary | ICD-10-CM

## 2019-07-18 DIAGNOSIS — E11.9 TYPE 2 DIABETES MELLITUS WITHOUT COMPLICATION, WITH LONG-TERM CURRENT USE OF INSULIN (HCC): Primary | ICD-10-CM

## 2019-07-18 NOTE — PROGRESS NOTES
HPI:   F/u visit for routine evaluation of HTN, T2DM, hypothyroidism  A1C/chol 7.4/182 2019 with TSH of .08; recent A1C 6.7  No acute issues  w/o chest pain/abd. discomfort; no dyspnea, cough or increased pedal edema; denies constitutional complaints of fever, night sweats or wt loss; no evidence of GI/ hemorrhage    ROS is otherwise negative.     Past Medical History:   Diagnosis Date    Breast cyst 3/2013    rt breast    Cholelithiasis     Diabetes ([facility] [address]     Endometriosis     Esophagitis, unspecified     Generalized osteoarthrosis, involving multiple sites     Gross hematuria     Hematuria, unspecified     Hypertension     Incisional hernia without mention of obstruction or gangrene     Morbid obesity ([facility] [address]     Pneumonia     Unspecified hypothyroidism     Unspecified venous (peripheral) insufficiency     Urolithiasis     Urolithiasis        Past Surgical History:   Procedure Laterality Date    BIOPSY OF BREAST, INCISIONAL  2012    right breast    HX COLONOSCOPY  2005    (vidhya) neg    HX HERNIA REPAIR Right     abdominal hernia, right side    HX HYSTERECTOMY      HX OTHER SURGICAL      pilonidal cyst excision x 2       Social History     Socioeconomic History    Marital status:      Spouse name: Not on file    Number of children: Not on file    Years of education: Not on file    Highest education level: Not on file   Occupational History    Not on file   Social Needs    Financial resource strain: Not on file    Food insecurity:     Worry: Not on file     Inability: Not on file    Transportation needs:     Medical: Not on file     Non-medical: Not on file   Tobacco Use    Smoking status: Former Smoker     Types: Cigarettes     Last attempt to quit: 2013     Years since quittin.5    Smokeless tobacco: Never Used   Substance and Sexual Activity    Alcohol use: Yes     Comment: occasional    Drug use: No    Sexual activity: Not on file   Lifestyle    Physical activity:     Days per week: Not on file     Minutes per session: Not on file    Stress: Not on file   Relationships    Social connections:     Talks on phone: Not on file     Gets together: Not on file     Attends Scientologist service: Not on file     Active member of club or organization: Not on file     Attends meetings of clubs or organizations: Not on file     Relationship status: Not on file    Intimate partner violence:     Fear of current or ex partner: Not on file     Emotionally abused: Not on file     Physically abused: Not on file     Forced sexual activity: Not on file   Other Topics Concern    Not on file   Social History Narrative    Not on file       Allergies   Allergen Reactions    Metformin Nausea Only       Family History   Problem Relation Age of Onset    Lung Disease Mother     Heart Attack Mother     Prostate Cancer Father 80    Cancer Father     Heart Disease Other     Cancer Daughter        Current Outpatient Medications   Medication Sig Dispense Refill    levothyroxine (SYNTHROID) 175 mcg tablet TAKE 1 TABLET BY MOUTH ONCE DAILY 90 Tab 2    JANUVIA 100 mg tablet TAKE 1 TABLET BY MOUTH ONCE DAILY 90 Tab 2    amLODIPine (NORVASC) 10 mg tablet Take 1 Tab by mouth daily. 90 Tab 3    benazepril (LOTENSIN) 40 mg tablet 1 qd 90 Tab 3    hydroCHLOROthiazide (HYDRODIURIL) 25 mg tablet TAKE 1 TABLET BY MOUTH ONCE DAILY IN THE MORNING 90 Tab 2    atenolol (TENORMIN) 100 mg tablet TAKE 1 TABLET BY MOUTH ONCE DAILY 90 Tab 3    naproxen sodium (ANAPROX) 550 mg tablet TAKE 1 TABLET BY MOUTH TWICE DAILY AS NEEDED FOR PAIN 60 Tab 5    glipiZIDE SR (GLUCOTROL XL) 10 mg CR tablet TAKE TWO TABLETS BY MOUTH ONCE DAILY IN THE MORNING 180 Tab 3    insulin glargine (LANTUS U-100 INSULIN) 100 unit/mL injection 60 units BID 11 Vial 3    canagliflozin (INVOKANA) 100 mg tablet Take 1 Tab by mouth Daily (before breakfast).  90 Tab 3    guaiFENesin-codeine (CHERATUSSIN AC) 100-10 mg/5 mL solution 1 or 2 tsp q 6 hrs prn cough 120 mL 1    traMADol (ULTRAM) 50 mg tablet 1 tid prn pain 90 Tab 3    ranitidine (ZANTAC) 300 mg tablet Take 300 mg by mouth daily. Visit Vitals  /80 (BP 1 Location: Right arm, BP Patient Position: Sitting)   Pulse 68   Temp 98.6 °F (37 °C) (Tympanic)   Resp 16   Ht 5' 3\" (1.6 m)   Wt 243 lb (110.2 kg)   SpO2 98%   BMI 43.05 kg/m²       PE  Well nourished in NAD  HEENT:   OP: clear. Neck: supple w/o mass or bruits. Chest: clear. CV: RRR w/o m,r,g; pulses intact. Abd: soft, NT, w/o HSM; +abd wall hernias  Ext: tr edema. Neuro: NF. Assessment and Plan    Encounter Diagnoses   Name Primary?  Type 2 diabetes mellitus without complication, with long-term current use of insulin (HCC) Yes    Essential hypertension, benign     Acquired hypothyroidism      BP @ goal  Labs to assess metabolic parameters (Q7KD, hypothyroidism) reviewed; stop invokana (A1C controlled and pt states too expensive to continue)  Up to date with eye exams; prevnar advised  Continue dietary/exercise efforts  Otherwise no change in rx  OV 4 mos or prn  I have explained plan to patient and the patient verbalizes understanding        Discussed the patient's BMI with her. The BMI follow up plan is as follows:     dietary management education, guidance, and counseling  encourage exercise  monitor weight  prescribed dietary intake    An After Visit Summary was printed and given to the patient.

## 2019-07-19 ENCOUNTER — HOSPITAL ENCOUNTER (OUTPATIENT)
Dept: LAB | Age: 64
Discharge: HOME | End: 2019-07-19
Payer: MEDICARE

## 2019-07-19 DIAGNOSIS — E03.9 ACQUIRED HYPOTHYROIDISM: ICD-10-CM

## 2019-07-19 DIAGNOSIS — E11.9 TYPE 2 DIABETES MELLITUS WITHOUT COMPLICATION, WITH LONG-TERM CURRENT USE OF INSULIN (HCC): ICD-10-CM

## 2019-07-19 DIAGNOSIS — Z79.4 TYPE 2 DIABETES MELLITUS WITHOUT COMPLICATION, WITH LONG-TERM CURRENT USE OF INSULIN (HCC): ICD-10-CM

## 2019-07-19 LAB
ALBUMIN SERPL-MCNC: 3.4 G/DL (ref 3.4–5)
ALBUMIN/GLOB SERPL: 1 {RATIO} (ref 0.8–1.7)
ALP SERPL-CCNC: 79 U/L (ref 45–117)
ALT SERPL-CCNC: 13 U/L (ref 13–56)
ANION GAP SERPL CALC-SCNC: 6 MMOL/L (ref 3–18)
AST SERPL-CCNC: 9 U/L (ref 10–38)
BILIRUB SERPL-MCNC: 0.4 MG/DL (ref 0.2–1)
BUN SERPL-MCNC: 21 MG/DL (ref 7–18)
BUN/CREAT SERPL: 18 (ref 12–20)
CALCIUM SERPL-MCNC: 9.1 MG/DL (ref 8.5–10.1)
CHLORIDE SERPL-SCNC: 107 MMOL/L (ref 100–111)
CHOLEST SERPL-MCNC: 169 MG/DL
CO2 SERPL-SCNC: 28 MMOL/L (ref 21–32)
CREAT SERPL-MCNC: 1.17 MG/DL (ref 0.6–1.3)
GLOBULIN SER CALC-MCNC: 3.3 G/DL (ref 2–4)
GLUCOSE SERPL-MCNC: 81 MG/DL (ref 74–99)
HDLC SERPL-MCNC: 37 MG/DL (ref 40–60)
HDLC SERPL: 4.6 {RATIO} (ref 0–5)
LDLC SERPL CALC-MCNC: 106.2 MG/DL (ref 0–100)
LIPID PROFILE,FLP: ABNORMAL
POTASSIUM SERPL-SCNC: 5.2 MMOL/L (ref 3.5–5.5)
PROT SERPL-MCNC: 6.7 G/DL (ref 6.4–8.2)
SODIUM SERPL-SCNC: 141 MMOL/L (ref 136–145)
TRIGL SERPL-MCNC: 129 MG/DL (ref ?–150)
TSH SERPL DL<=0.05 MIU/L-ACNC: 0.1 UIU/ML (ref 0.36–3.74)
VLDLC SERPL CALC-MCNC: 25.8 MG/DL

## 2019-07-19 PROCEDURE — 80053 COMPREHEN METABOLIC PANEL: CPT

## 2019-07-19 PROCEDURE — 80061 LIPID PANEL: CPT

## 2019-07-19 PROCEDURE — 36415 COLL VENOUS BLD VENIPUNCTURE: CPT

## 2019-07-19 PROCEDURE — 84443 ASSAY THYROID STIM HORMONE: CPT

## 2019-07-19 PROCEDURE — 83036 HEMOGLOBIN GLYCOSYLATED A1C: CPT

## 2019-07-20 LAB
EST. AVERAGE GLUCOSE BLD GHB EST-MCNC: 146 MG/DL
HBA1C MFR BLD: 6.7 % (ref 4.2–5.6)

## 2019-07-23 ENCOUNTER — OFFICE VISIT (OUTPATIENT)
Dept: FAMILY MEDICINE CLINIC | Age: 64
End: 2019-07-23

## 2019-07-23 VITALS
BODY MASS INDEX: 43.05 KG/M2 | OXYGEN SATURATION: 98 % | WEIGHT: 243 LBS | SYSTOLIC BLOOD PRESSURE: 134 MMHG | TEMPERATURE: 98.6 F | HEART RATE: 68 BPM | RESPIRATION RATE: 16 BRPM | HEIGHT: 63 IN | DIASTOLIC BLOOD PRESSURE: 80 MMHG

## 2019-07-23 DIAGNOSIS — Z23 ENCOUNTER FOR IMMUNIZATION: ICD-10-CM

## 2019-07-23 DIAGNOSIS — E11.9 TYPE 2 DIABETES MELLITUS WITHOUT COMPLICATION, WITH LONG-TERM CURRENT USE OF INSULIN (HCC): Primary | ICD-10-CM

## 2019-07-23 DIAGNOSIS — Z79.4 TYPE 2 DIABETES MELLITUS WITHOUT COMPLICATION, WITH LONG-TERM CURRENT USE OF INSULIN (HCC): Primary | ICD-10-CM

## 2019-07-23 DIAGNOSIS — E03.9 ACQUIRED HYPOTHYROIDISM: ICD-10-CM

## 2019-07-23 DIAGNOSIS — I10 ESSENTIAL HYPERTENSION, BENIGN: ICD-10-CM

## 2019-07-23 NOTE — PROGRESS NOTES
Chief Complaint   Patient presents with    Diabetes       Pt preferred language for health care discussion is english. Is someone accompanying this pt? no    Is the patient using any DME equipment during OV? no    Depression Screening:  3 most recent PHQ Screens 3/7/2019 3/7/2019 8/9/2018 5/18/2017 8/21/2015   Little interest or pleasure in doing things Not at all Not at all Not at all Not at all Not at all   Feeling down, depressed, irritable, or hopeless Not at all Not at all Not at all Not at all Not at all   Total Score PHQ 2 0 0 0 0 0       Learning Assessment:  Learning Assessment 8/21/2015 9/3/2014   PRIMARY LEARNER Patient Patient   HIGHEST LEVEL OF EDUCATION - PRIMARY LEARNER  [address] LEARNER NONE -   [address] CAREGIVER No -   PRIMARY LANGUAGE ENGLISH ENGLISH   LEARNER PREFERENCE PRIMARY DEMONSTRATION LISTENING   ANSWERED BY patient patient    RELATIONSHIP SELF SELF       Abuse Screening:  Abuse Screening Questionnaire 11/3/2016   Do you ever feel afraid of your partner? N   Are you in a relationship with someone who physically or mentally threatens you? N   Is it safe for you to go home? Y       Fall Risk  Fall Risk Assessment, last 12 mths 3/7/2019   Able to walk? Yes   Fall in past 12 months? No           Advance Directive:  1. Do you have an advance directive in place? Patient Reply:no    2. If not, would you like material regarding how to put one in place? Patient Reply:. Coordination of Care:  1. Have you been to the ER, urgent care clinic since your last visit? Hospitalized since your last visit? no    2. Have you seen or consulted any other health care providers outside of the [address] [place] [address] since your last visit? Include any pap smears or colon screening.  no    Provider prefers to do his own med reconciliation

## 2019-07-23 NOTE — PATIENT INSTRUCTIONS
Body Mass Index: Care Instructions  Your Care Instructions    Body mass index (BMI) can help you see if your weight is raising your risk for health problems. It uses a formula to compare how much you weigh with how tall you are. · A BMI lower than 18.5 is considered underweight. · A BMI between 18.5 and 24.9 is considered healthy. · A BMI between 25 and 29.9 is considered overweight. A BMI of 30 or higher is considered obese. If your BMI is in the normal range, it means that you have a lower risk for weight-related health problems. If your BMI is in the overweight or obese range, you may be at increased risk for weight-related health problems, such as high blood pressure, heart disease, stroke, arthritis or joint pain, and diabetes. If your BMI is in the underweight range, you may be at increased risk for health problems such as fatigue, lower protection (immunity) against illness, muscle loss, bone loss, hair loss, and hormone problems. BMI is just one measure of your risk for weight-related health problems. You may be at higher risk for health problems if you are not active, you eat an unhealthy diet, or you drink too much alcohol or use tobacco products. Follow-up care is a key part of your treatment and safety. Be sure to make and go to all appointments, and call your doctor if you are having problems. It's also a good idea to know your test results and keep a list of the medicines you take. How can you care for yourself at home? · Practice healthy eating habits. This includes eating plenty of fruits, vegetables, whole grains, lean protein, and low-fat dairy. · If your doctor recommends it, get more exercise. Walking is a good choice. Bit by bit, increase the amount you walk every day. Try for at least 30 minutes on most days of the week. · Do not smoke. Smoking can increase your risk for health problems. If you need help quitting, talk to your doctor about stop-smoking programs and medicines. These can increase your chances of quitting for good. · Limit alcohol to 2 drinks a day for men and 1 drink a day for women. Too much alcohol can cause health problems. If you have a BMI higher than 25  · Your doctor may do other tests to check your risk for weight-related health problems. This may include measuring the distance around your waist. A waist measurement of more than 40 inches in men or 35 inches in women can increase the risk of weight-related health problems. · Talk with your doctor about steps you can take to stay healthy or improve your health. You may need to make lifestyle changes to lose weight and stay healthy, such as changing your diet and getting regular exercise. If you have a BMI lower than 18.5  · Your doctor may do other tests to check your risk for health problems. · Talk with your doctor about steps you can take to stay healthy or improve your health. You may need to make lifestyle changes to gain or maintain weight and stay healthy, such as getting more healthy foods in your diet and doing exercises to build muscle. Where can you learn more? Go to http://essie-aura.info/. Enter S176 in the search box to learn more about \"Body Mass Index: Care Instructions. \"  Current as of: October 13, 2016  Content Version: 11.4  © 2049-5340 Healthwise, Incorporated. Care instructions adapted under license by [employer] (which disclaims liability or warranty for this information). If you have questions about a medical condition or this instruction, always ask your healthcare professional. [place] [address] any warranty or liability for your use of this information. Learning About Meal Planning for Diabetes  Why plan your meals? Meal planning can be a key part of managing diabetes.  Planning meals and snacks with the right balance of carbohydrate, protein, and fat can help you keep your blood sugar at the target level you set with your doctor. You don't have to eat special foods. You can eat what your family eats, including sweets once in a while. But you do have to pay attention to how often you eat and how much you eat of certain foods. You may want to work with a dietitian or a certified diabetes educator. He or she can give you tips and meal ideas and can answer your questions about meal planning. This health professional can also help you reach a healthy weight if that is one of your goals. What plan is right for you? Your dietitian or diabetes educator may suggest that you start with the plate format or carbohydrate counting. The plate format  The plate format is a simple way to help you manage how you eat. You plan meals by learning how much space each food should take on a plate. Using the plate format helps you spread carbohydrate throughout the day. It can make it easier to keep your blood sugar level within your target range. It also helps you see if you're eating healthy portion sizes. To use the plate format, you put non-starchy vegetables on half your plate. Add meat or meat substitutes on one-quarter of the plate. Put a grain or starchy vegetable (such as brown rice or a potato) on the final quarter of the plate. You can add a small piece of fruit and some low-fat or fat-free milk or yogurt, depending on your carbohydrate goal for each meal.  Here are some tips for using the plate format:  · Make sure that you are not using an oversized plate. A 9-inch plate is best. Many restaurants use larger plates. · Get used to using the plate format at home. Then you can use it when you eat out. · Write down your questions about using the plate format. Talk to your doctor, a dietitian, or a diabetes educator about your concerns. Carbohydrate counting  With carbohydrate counting, you plan meals based on the amount of carbohydrate in each food. Carbohydrate raises blood sugar higher and more quickly than any other nutrient.  It is found in desserts, breads and cereals, and fruit. It's also found in starchy vegetables such as potatoes and corn, grains such as rice and pasta, and milk and yogurt. Spreading carbohydrate throughout the day helps keep your blood sugar levels within your target range. Your daily amount depends on several things, including your weight, how active you are, which diabetes medicines you take, and what your goals are for your blood sugar levels. A registered dietitian or diabetes educator can help you plan how much carbohydrate to include in each meal and snack. A guideline for your daily amount of carbohydrate is:  · 45 to 60 grams at each meal. That's about the same as 3 to 4 carbohydrate servings. · 15 to 20 grams at each snack. That's about the same as 1 carbohydrate serving. The Nutrition Facts label on packaged foods tells you how much carbohydrate is in a serving of the food. First, look at the serving size on the food label. Is that the amount you eat in a serving? All of the nutrition information on a food label is based on that serving size. So if you eat more or less than that, you'll need to adjust the other numbers. Total carbohydrate is the next thing you need to look for on the label. If you count carbohydrate servings, one serving of carbohydrate is 15 grams. For foods that don't come with labels, such as fresh fruits and vegetables, you'll need a guide that lists carbohydrate in these foods. Ask your doctor, dietitian, or diabetes educator about books or other nutrition guides you can use. If you take insulin, you need to know how many grams of carbohydrate are in a meal. This lets you know how much rapid-acting insulin to take before you eat. If you use an insulin pump, you get a constant rate of insulin during the day. So the pump must be programmed at meals to give you extra insulin to cover the rise in blood sugar after meals.   When you know how much carbohydrate you will eat, you can take the right amount of insulin. Or, if you always use the same amount of insulin, you need to make sure that you eat the same amount of carbohydrate at meals. If you need more help to understand carbohydrate counting and food labels, ask your doctor, dietitian, or diabetes educator. How do you get started with meal planning? Here are some tips to get started:  · Plan your meals a week at a time. Don't forget to include snacks too. · Use cookbooks or online recipes to plan several main meals. Plan some quick meals for busy nights. You also can double some recipes that freeze well. Then you can save half for other busy nights when you don't have time to cook. · Make sure you have the ingredients you need for your recipes. If you're running low on basic items, put these items on your shopping list too. · List foods that you use to make breakfasts, lunches, and snacks. List plenty of fruits and vegetables. · Post this list on the refrigerator. Add to it as you think of more things you need. · Take the list to the store to do your weekly shopping. Follow-up care is a key part of your treatment and safety. Be sure to make and go to all appointments, and call your doctor if you are having problems. It's also a good idea to know your test results and keep a list of the medicines you take. Where can you learn more? Go to http://essie-aura.info/. Nancie Brand in the search box to learn more about \"Learning About Meal Planning for Diabetes. \"  Current as of: July 25, 2018  Content Version: 12.1  © 0290-0692 Healthwise, Incorporated. Care instructions adapted under license by [employer] (which disclaims liability or warranty for this information). If you have questions about a medical condition or this instruction, always ask your healthcare professional. [address] any warranty or liability for your use of this information.

## 2019-11-08 RX ORDER — GLIPIZIDE 10 MG/1
TABLET, FILM COATED, EXTENDED RELEASE ORAL
Qty: 180 TAB | Refills: 3 | Status: SHIPPED | OUTPATIENT
Start: 2019-11-08 | End: 2020-10-26

## 2020-02-13 RX ORDER — SITAGLIPTIN 100 MG/1
TABLET, FILM COATED ORAL
Qty: 90 TAB | Refills: 0 | Status: SHIPPED | OUTPATIENT
Start: 2020-02-13 | End: 2020-05-21

## 2020-03-01 RX ORDER — ATENOLOL 100 MG/1
TABLET ORAL
Qty: 90 TAB | Refills: 0 | Status: SHIPPED | OUTPATIENT
Start: 2020-03-01 | End: 2020-06-03

## 2020-03-17 DIAGNOSIS — Z79.4 TYPE 2 DIABETES MELLITUS WITHOUT COMPLICATION, WITH LONG-TERM CURRENT USE OF INSULIN (HCC): Primary | ICD-10-CM

## 2020-03-17 DIAGNOSIS — E03.9 ACQUIRED HYPOTHYROIDISM: ICD-10-CM

## 2020-03-17 DIAGNOSIS — E11.9 TYPE 2 DIABETES MELLITUS WITHOUT COMPLICATION, WITH LONG-TERM CURRENT USE OF INSULIN (HCC): Primary | ICD-10-CM

## 2020-03-17 RX ORDER — LEVOTHYROXINE SODIUM 175 UG/1
TABLET ORAL
Qty: 90 TAB | Refills: 2 | Status: SHIPPED | OUTPATIENT
Start: 2020-03-17 | End: 2020-08-06 | Stop reason: SDUPTHER

## 2020-03-20 DIAGNOSIS — E11.9 TYPE 2 DIABETES MELLITUS WITHOUT COMPLICATION, WITH LONG-TERM CURRENT USE OF INSULIN (HCC): Primary | ICD-10-CM

## 2020-03-20 DIAGNOSIS — E03.9 ACQUIRED HYPOTHYROIDISM: Primary | ICD-10-CM

## 2020-03-20 DIAGNOSIS — Z79.4 TYPE 2 DIABETES MELLITUS WITHOUT COMPLICATION, WITH LONG-TERM CURRENT USE OF INSULIN (HCC): Primary | ICD-10-CM

## 2020-05-01 RX ORDER — INSULIN PUMP SYRINGE, 3 ML
EACH MISCELLANEOUS
Qty: 1 KIT | Refills: 0 | Status: SHIPPED | OUTPATIENT
Start: 2020-05-01

## 2020-05-01 NOTE — TELEPHONE ENCOUNTER
Please fax to [phone] is requesting new Rx's for meter and strips to be faxed to them at [phone]    Last Visit: 7/23/19 with MD Cassandra Parker  Next Appointment: 7/1/20 pt cancelled appt    Requested Prescriptions     Pending Prescriptions Disp Refills    Blood-Glucose Meter monitoring kit 1 Kit 0     Sig: Test daily as directed  Dx E11.9    glucose blood VI test strips (blood glucose test) strip 100 Strip 3     Sig: Test daily as directed  Dx E11.9

## 2020-05-05 ENCOUNTER — TELEPHONE (OUTPATIENT)
Dept: FAMILY MEDICINE CLINIC | Age: 65
End: 2020-05-05

## 2020-05-21 RX ORDER — BENAZEPRIL HYDROCHLORIDE 40 MG/1
TABLET ORAL
Qty: 90 TAB | Refills: 0 | Status: SHIPPED | OUTPATIENT
Start: 2020-05-21 | End: 2020-08-14

## 2020-05-21 RX ORDER — SITAGLIPTIN 100 MG/1
TABLET, FILM COATED ORAL
Qty: 90 TAB | Refills: 0 | Status: SHIPPED | OUTPATIENT
Start: 2020-05-21 | End: 2020-08-14

## 2020-05-21 RX ORDER — AMLODIPINE BESYLATE 10 MG/1
TABLET ORAL
Qty: 90 TAB | Refills: 0 | Status: SHIPPED | OUTPATIENT
Start: 2020-05-21 | End: 2020-08-14

## 2020-06-03 RX ORDER — ATENOLOL 100 MG/1
TABLET ORAL
Qty: 90 TAB | Refills: 0 | Status: SHIPPED | OUTPATIENT
Start: 2020-06-03 | End: 2020-08-29

## 2020-07-27 NOTE — PROGRESS NOTES
This is an Initial Medicare Annual Wellness Exam (AWV) (Performed 12 months after IPPE or effective date of Medicare Part B enrollment, Once in a lifetime)    I have reviewed the patient's medical history in detail and updated the computerized patient record.      History     Patient Active Problem List   Diagnosis Code    Gross hematuria R31.0    Recurrent UTI N39.0    Morbid obesity ([facility] E66.01    Incisional hernia K43.2    Esophagitis, unspecified K20.9    Generalized osteoarthrosis, involving multiple sites M15.9    Unspecified venous (peripheral) insufficiency I87.2    Hypothyroidism E03.9    Essential hypertension, benign I10    DM type 2 (diabetes mellitus, type 2) ([facility] [address] E11.9    Fibrocystic change of breast N60.19    Mixed hyperlipidemia E78.2     Past Medical History:   Diagnosis Date    Breast cyst 3/2013    rt breast    Cholelithiasis     Diabetes ([facility] [address] [facility]     Endometriosis     Esophagitis, unspecified     Generalized osteoarthrosis, involving multiple sites     Gross hematuria     Hematuria, unspecified     Hypertension     Incisional hernia without mention of obstruction or gangrene     Morbid obesity ([facility] [address] [facility]     Pneumonia     Unspecified hypothyroidism     Unspecified venous (peripheral) insufficiency     Urolithiasis     Urolithiasis       Past Surgical History:   Procedure Laterality Date    HX COLONOSCOPY  4/2005    (vidhya) neg    HX HERNIA REPAIR Right     abdominal hernia, right side    HX HYSTERECTOMY  1994    HX OTHER SURGICAL      pilonidal cyst excision x 2    AR BIOPSY OF BREAST, INCISIONAL  Jan 2012    right breast     Current Outpatient Medications   Medication Sig Dispense Refill    levothyroxine (SYNTHROID) 175 mcg tablet TAKE 1 TABLET BY MOUTH ONCE DAILY 90 Tab 2    atenoloL (TENORMIN) 100 mg tablet Take 1 tablet by mouth once daily 90 Tab 0    benazepriL (LOTENSIN) 40 mg tablet Take 1 tablet by mouth once daily 90 Tab 0    amLODIPine (NORVASC) 10 mg tablet Take 1 tablet by mouth once daily 90 Tab 0    Januvia 100 mg tablet Take 1 tablet by mouth once daily 90 Tab 0    Blood-Glucose Meter monitoring kit Test daily as directed  Dx E11.9 1 Kit 0    glucose blood VI test strips (blood glucose test) strip Test daily as directed  Dx E11.9 100 Strip 3    hydroCHLOROthiazide (HYDRODIURIL) 25 mg tablet TAKE 1 TABLET BY MOUTH IN THE MORNING 90 Tab 1    glipiZIDE SR (GLUCOTROL XL) 10 mg CR tablet TAKE 2 TABLETS BY MOUTH ONCE DAILY IN THE MORNING 180 Tab 3    insulin glargine (LANTUS U-100 INSULIN) 100 unit/mL injection INJECT 60 UNITS SUBCUTANEOUSLY TWICE DAILY 11 Vial 3    naproxen sodium (ANAPROX) 550 mg tablet TAKE 1 TABLET BY MOUTH TWICE DAILY AS NEEDED FOR PAIN 60 Tab 5    guaiFENesin-codeine (CHERATUSSIN AC) 100-10 mg/5 mL solution 1 or 2 tsp q 6 hrs prn cough 120 mL 1     Allergies   Allergen Reactions    Metformin Nausea Only       Family History   Problem Relation Age of Onset    Lung Disease Mother     Heart Attack Mother     Prostate Cancer Father 80    Cancer Father     Heart Disease Other     Cancer Daughter      Social History     Tobacco Use    Smoking status: Former Smoker     Types: Cigarettes     Last attempt to quit: 2013     Years since quittin.6    Smokeless tobacco: Never Used   Substance Use Topics    Alcohol use: Yes     Comment: occasional       Depression Risk Factor Screening:     3 most recent PHQ Screens 2020   Little interest or pleasure in doing things Not at all   Feeling down, depressed, irritable, or hopeless Not at all   Total Score PHQ 2 0       Alcohol Risk Factor Screening:   Do you average 1 drink per night or more than 7 drinks a week:  No    On any one occasion in the past three months have you have had more than 3 drinks containing alcohol:  No      Functional Ability and Level of Safety:   Hearing: Hearing is good. Activities of Daily Living: The home contains: no safety equipment.   Patient does total self care    Ambulation: with no difficulty      Fall Risk:  Fall Risk Assessment, last 12 mths 3/7/2019   Able to walk? Yes   Fall in past 12 months? No     Abuse Screen:  Patient is not abused       Cognitive Screening   Has your family/caregiver stated any concerns about your memory: no     A&O x 3; answers questions appropriately      Patient Care Team   Patient Care Team:  Jc Schwab MD as PCP - General (Internal Medicine)  Jc Schwab MD as PCP - [facility]  Mahesh Smith MD (Surgery)  Jayne Jeff MD (Urology)    Assessment/Plan   Education and counseling provided:  Are appropriate based on today's review and evaluation    Diagnoses and all orders for this visit:    1. Routine general medical examination at a health care facility    2. Morbid obesity ([facility] [address]    3. Type 2 diabetes mellitus without complication, with long-term current use of insulin ([address]    4. Essential hypertension, benign    5.  Acquired hypothyroidism    Other orders  -     levothyroxine (SYNTHROID) 125 mcg tablet; TAKE 1 TABLET BY MOUTH ONCE DAILY         Health Maintenance Due   Topic Date Due    Hepatitis C Screening  1955    Shingrix Vaccine Age 50> (1 of 2) 01/18/2004    FOBT Q1Y Age 50-75  01/18/2004    Foot Exam Q1  05/18/2018    Bone Densitometry (Dexa) Screening  01/18/2019    Medicare Yearly Exam  06/06/2019    MICROALBUMIN Q1  03/06/2020    GLAUCOMA SCREENING Q2Y  07/03/2020    Eye Exam Retinal or Dilated  07/03/2020    A1C test (Diabetic or Prediabetic)  07/19/2020    Pneumococcal 65+ years (2 of 2 - PPSV23) 07/23/2020    Influenza Age 5 to Adult  08/01/2020    Lipid Screen  07/19/2020   Medicare wellness performed  Labs reviewed  Mammogram advised  Continue dietary/activity efforts  [place] advised  Vaccines: pneumovax advised  Advance directive discussed    PROGRESS NOTE    HPI:   F/u visit for routine evaluation of HTN, T2DM, hypothyroidism  A1C/chol 6.7/169 July 2019 with TSH of .1; recent A1C 7.7 with TSH of .03  No acute issues  Currently w/o chest pain/abd. discomfort; no dyspnea, cough or increased pedal edema; denies constitutional complaints of fever, night sweats or wt loss; no evidence of GI/ hemorrhage    ROS is otherwise negative.     Past Medical History:   Diagnosis Date    Breast cyst 3/2013    rt breast    Cholelithiasis     [facility]     Endometriosis     Esophagitis, unspecified     Generalized osteoarthrosis, involving multiple sites     Gross hematuria     Hematuria, unspecified     Hypertension     Incisional hernia without mention of obstruction or gangrene     Morbid obesity ([facility] [address]     Pneumonia     Unspecified hypothyroidism     Unspecified venous (peripheral) insufficiency     Urolithiasis     Urolithiasis        Past Surgical History:   Procedure Laterality Date    HX COLONOSCOPY  2005    (vidhya) neg    HX HERNIA REPAIR Right     abdominal hernia, right side    HX HYSTERECTOMY      HX OTHER SURGICAL      pilonidal cyst excision x 2    NC BIOPSY OF BREAST, INCISIONAL  2012    right breast       Social History     Socioeconomic History    Marital status:      Spouse name: Not on file    Number of children: Not on file    Years of education: Not on file    Highest education level: Not on file   Occupational History    Not on file   Social Needs    Financial resource strain: Not on file    Food insecurity     Worry: Not on file     Inability: Not on file    Transportation needs     Medical: Not on file     Non-medical: Not on file   Tobacco Use    Smoking status: Former Smoker     Types: Cigarettes     Last attempt to quit: 2013     Years since quittin.6    Smokeless tobacco: Never Used   Substance and Sexual Activity    Alcohol use: Yes     Comment: occasional    Drug use: No    Sexual activity: Not on file   Lifestyle    Physical activity     Days per week: Not on file     Minutes per session: Not on file    Stress: Not on file   Relationships    Social connections     Talks on phone: Not on file     Gets together: Not on file     Attends Denominational service: Not on file     Active member of club or organization: Not on file     Attends meetings of clubs or organizations: Not on file     Relationship status: Not on file    Intimate partner violence     Fear of current or ex partner: Not on file     Emotionally abused: Not on file     Physically abused: Not on file     Forced sexual activity: Not on file   Other Topics Concern    Not on file   Social History Narrative    Not on file       Allergies   Allergen Reactions    Metformin Nausea Only       Family History   Problem Relation Age of Onset    Lung Disease Mother     Heart Attack Mother     Prostate Cancer Father 80    Cancer Father     Heart Disease Other     Cancer Daughter        Current Outpatient Medications   Medication Sig Dispense Refill    levothyroxine (SYNTHROID) 175 mcg tablet TAKE 1 TABLET BY MOUTH ONCE DAILY 90 Tab 2    atenoloL (TENORMIN) 100 mg tablet Take 1 tablet by mouth once daily 90 Tab 0    benazepriL (LOTENSIN) 40 mg tablet Take 1 tablet by mouth once daily 90 Tab 0    amLODIPine (NORVASC) 10 mg tablet Take 1 tablet by mouth once daily 90 Tab 0    Januvia 100 mg tablet Take 1 tablet by mouth once daily 90 Tab 0    Blood-Glucose Meter monitoring kit Test daily as directed  Dx E11.9 1 Kit 0    glucose blood VI test strips (blood glucose test) strip Test daily as directed  Dx E11.9 100 Strip 3    hydroCHLOROthiazide (HYDRODIURIL) 25 mg tablet TAKE 1 TABLET BY MOUTH IN THE MORNING 90 Tab 1    glipiZIDE SR (GLUCOTROL XL) 10 mg CR tablet TAKE 2 TABLETS BY MOUTH ONCE DAILY IN THE MORNING 180 Tab 3    insulin glargine (LANTUS U-100 INSULIN) 100 unit/mL injection INJECT 60 UNITS SUBCUTANEOUSLY TWICE DAILY 11 Vial 3    naproxen sodium (ANAPROX) 550 mg tablet TAKE 1 TABLET BY MOUTH TWICE DAILY AS NEEDED FOR PAIN 60 Tab 5  guaiFENesin-codeine (CHERATUSSIN AC) 100-10 mg/5 mL solution 1 or 2 tsp q 6 hrs prn cough 120 mL 1           Visit Vitals  /80 (BP 1 Location: Right arm, BP Patient Position: Sitting)   Pulse 62   Temp 97.7 °F (36.5 °C) (Temporal)   Resp 16   Ht 5' 3\" (1.6 m)   Wt 247 lb (112 kg)   SpO2 93%   BMI 43.75 kg/m²       PE  Heavy in NAD  HEENT:  OP: clear. Neck: supple w/o mass or bruits. Chest: clear. CV: RRR w/o m,r,g; pulses intact. Abd: soft, NT, w/o HSM; multiple abd wall hernias  Ext: tr edema. Neuro: NF. Assessment and Plan    Encounter Diagnoses   Name Primary?  Routine general medical examination at a health care facility Yes    Morbid obesity ([address]     Type 2 diabetes mellitus without complication, with long-term current use of insulin ([address]     Essential hypertension, benign     Acquired hypothyroidism      HTN - controlled  T2DM - continue dietary/exercise efforts  Hypothyroidism - reduce synthroid to . 125 mg qd  Otherwise, no change in rx  OV 4 mos or prn  I have explained plan to patient and the patient verbalizes understanding

## 2020-07-28 ENCOUNTER — HOSPITAL ENCOUNTER (OUTPATIENT)
Dept: LAB | Age: 65
Discharge: HOME | End: 2020-07-28
Payer: MEDICARE

## 2020-07-28 DIAGNOSIS — E03.9 ACQUIRED HYPOTHYROIDISM: ICD-10-CM

## 2020-07-28 DIAGNOSIS — Z79.4 TYPE 2 DIABETES MELLITUS WITHOUT COMPLICATION, WITH LONG-TERM CURRENT USE OF INSULIN (HCC): ICD-10-CM

## 2020-07-28 DIAGNOSIS — E11.9 TYPE 2 DIABETES MELLITUS WITHOUT COMPLICATION, WITH LONG-TERM CURRENT USE OF INSULIN (HCC): ICD-10-CM

## 2020-07-28 LAB
ALBUMIN SERPL-MCNC: 3.3 G/DL (ref 3.4–5)
ALBUMIN/GLOB SERPL: 0.9 {RATIO} (ref 0.8–1.7)
ALP SERPL-CCNC: 80 U/L (ref 45–117)
ALT SERPL-CCNC: 14 U/L (ref 13–56)
ANION GAP SERPL CALC-SCNC: 3 MMOL/L (ref 3–18)
AST SERPL-CCNC: <3 U/L (ref 10–38)
BASOPHILS # BLD: 0.1 K/UL (ref 0–0.1)
BASOPHILS NFR BLD: 1 % (ref 0–2)
BILIRUB SERPL-MCNC: 0.7 MG/DL (ref 0.2–1)
BUN SERPL-MCNC: 22 MG/DL (ref 7–18)
BUN/CREAT SERPL: 24 (ref 12–20)
CALCIUM SERPL-MCNC: 9.1 MG/DL (ref 8.5–10.1)
CHLORIDE SERPL-SCNC: 107 MMOL/L (ref 100–111)
CHOLEST SERPL-MCNC: 173 MG/DL
CO2 SERPL-SCNC: 29 MMOL/L (ref 21–32)
CREAT SERPL-MCNC: 0.93 MG/DL (ref 0.6–1.3)
DIFFERENTIAL METHOD BLD: ABNORMAL
EOSINOPHIL # BLD: 0.5 K/UL (ref 0–0.4)
EOSINOPHIL NFR BLD: 5 % (ref 0–5)
ERYTHROCYTE [DISTWIDTH] IN BLOOD BY AUTOMATED COUNT: 13.6 % (ref 11.6–14.5)
EST. AVERAGE GLUCOSE BLD GHB EST-MCNC: 171 MG/DL
GLOBULIN SER CALC-MCNC: 3.8 G/DL (ref 2–4)
GLUCOSE SERPL-MCNC: 114 MG/DL (ref 74–99)
HBA1C MFR BLD: 7.6 % (ref 4.2–5.6)
HCT VFR BLD AUTO: 40.1 % (ref 35–45)
HDLC SERPL-MCNC: 31 MG/DL (ref 40–60)
HDLC SERPL: 5.6 {RATIO} (ref 0–5)
HGB BLD-MCNC: 13.5 G/DL (ref 12–16)
LDLC SERPL CALC-MCNC: 111.8 MG/DL (ref 0–100)
LIPID PROFILE,FLP: ABNORMAL
LYMPHOCYTES # BLD: 2.4 K/UL (ref 0.9–3.6)
LYMPHOCYTES NFR BLD: 22 % (ref 21–52)
MCH RBC QN AUTO: 29.2 PG (ref 24–34)
MCHC RBC AUTO-ENTMCNC: 33.7 G/DL (ref 31–37)
MCV RBC AUTO: 86.6 FL (ref 74–97)
MONOCYTES # BLD: 1 K/UL (ref 0.05–1.2)
MONOCYTES NFR BLD: 9 % (ref 3–10)
NEUTS SEG # BLD: 7.1 K/UL (ref 1.8–8)
NEUTS SEG NFR BLD: 63 % (ref 40–73)
PLATELET # BLD AUTO: 371 K/UL (ref 135–420)
PMV BLD AUTO: 10 FL (ref 9.2–11.8)
POTASSIUM SERPL-SCNC: 4.8 MMOL/L (ref 3.5–5.5)
PROT SERPL-MCNC: 7.1 G/DL (ref 6.4–8.2)
RBC # BLD AUTO: 4.63 M/UL (ref 4.2–5.3)
SODIUM SERPL-SCNC: 139 MMOL/L (ref 136–145)
TRIGL SERPL-MCNC: 151 MG/DL (ref ?–150)
TSH SERPL DL<=0.05 MIU/L-ACNC: 0.03 UIU/ML (ref 0.36–3.74)
VLDLC SERPL CALC-MCNC: 30.2 MG/DL
WBC # BLD AUTO: 11 K/UL (ref 4.6–13.2)

## 2020-07-28 PROCEDURE — 80053 COMPREHEN METABOLIC PANEL: CPT

## 2020-07-28 PROCEDURE — 84443 ASSAY THYROID STIM HORMONE: CPT

## 2020-07-28 PROCEDURE — 80061 LIPID PANEL: CPT

## 2020-07-28 PROCEDURE — 83036 HEMOGLOBIN GLYCOSYLATED A1C: CPT

## 2020-07-28 PROCEDURE — 85025 COMPLETE CBC W/AUTO DIFF WBC: CPT

## 2020-07-28 PROCEDURE — 36415 COLL VENOUS BLD VENIPUNCTURE: CPT

## 2020-08-06 ENCOUNTER — OFFICE VISIT (OUTPATIENT)
Dept: FAMILY MEDICINE CLINIC | Age: 65
End: 2020-08-06

## 2020-08-06 VITALS
OXYGEN SATURATION: 93 % | DIASTOLIC BLOOD PRESSURE: 80 MMHG | WEIGHT: 247 LBS | TEMPERATURE: 97.7 F | HEIGHT: 63 IN | SYSTOLIC BLOOD PRESSURE: 138 MMHG | RESPIRATION RATE: 16 BRPM | HEART RATE: 62 BPM | BODY MASS INDEX: 43.77 KG/M2

## 2020-08-06 DIAGNOSIS — E03.9 ACQUIRED HYPOTHYROIDISM: ICD-10-CM

## 2020-08-06 DIAGNOSIS — Z00.00 ROUTINE GENERAL MEDICAL EXAMINATION AT A HEALTH CARE FACILITY: Primary | ICD-10-CM

## 2020-08-06 DIAGNOSIS — Z79.4 TYPE 2 DIABETES MELLITUS WITHOUT COMPLICATION, WITH LONG-TERM CURRENT USE OF INSULIN (HCC): ICD-10-CM

## 2020-08-06 DIAGNOSIS — I10 ESSENTIAL HYPERTENSION, BENIGN: ICD-10-CM

## 2020-08-06 DIAGNOSIS — Z23 ENCOUNTER FOR IMMUNIZATION: ICD-10-CM

## 2020-08-06 DIAGNOSIS — E66.01 MORBID OBESITY (HCC): ICD-10-CM

## 2020-08-06 DIAGNOSIS — E11.9 TYPE 2 DIABETES MELLITUS WITHOUT COMPLICATION, WITH LONG-TERM CURRENT USE OF INSULIN (HCC): ICD-10-CM

## 2020-08-06 RX ORDER — LEVOTHYROXINE SODIUM 125 UG/1
TABLET ORAL
Qty: 90 TAB | Refills: 3 | Status: SHIPPED | OUTPATIENT
Start: 2020-08-06 | End: 2021-09-16 | Stop reason: SDUPTHER

## 2020-08-06 NOTE — PROGRESS NOTES
Chief Complaint   Patient presents with    Diabetes       Pt preferred language for health care discussion is english. Is someone accompanying this pt? no    Is the patient using any DME equipment during OV? no    Depression Screening:  3 most recent PHQ Screens 8/6/2020 3/7/2019 3/7/2019 8/9/2018 5/18/2017 8/21/2015   Little interest or pleasure in doing things Not at all Not at all Not at all Not at all Not at all Not at all   Feeling down, depressed, irritable, or hopeless Not at all Not at all Not at all Not at all Not at all Not at all   Total Score PHQ 2 0 0 0 0 0 0       Learning Assessment:  Learning Assessment 8/21/2015 9/3/2014   PRIMARY LEARNER Patient Patient   HIGHEST LEVEL OF EDUCATION - PRIMARY LEARNER  [address] LEARNER NONE -   [address] CAREGIVER No -   PRIMARY LANGUAGE ENGLISH ENGLISH   LEARNER PREFERENCE PRIMARY DEMONSTRATION LISTENING   ANSWERED BY patient patient    RELATIONSHIP SELF SELF       Abuse Screening:  Abuse Screening Questionnaire 11/3/2016   Do you ever feel afraid of your partner? N   Are you in a relationship with someone who physically or mentally threatens you? N   Is it safe for you to go home? Y       Fall Risk  Fall Risk Assessment, last 12 mths 3/7/2019   Able to walk? Yes   Fall in past 12 months? No           Advance Directive:  1. Do you have an advance directive in place? Patient Reply:no    2. If not, would you like material regarding how to put one in place? Patient Reply:. Coordination of Care:  1. Have you been to the ER, urgent care clinic since your last visit? Hospitalized since your last visit? no    2. Have you seen or consulted any other health care providers outside of the [address] [place] [address] since your last visit? Include any pap smears or colon screening.  no    Provider prefers to do his own med reconciliation

## 2020-08-06 NOTE — PATIENT INSTRUCTIONS
Medicare Wellness Visit, Female     The best way to live healthy is to have a lifestyle where you eat a well-balanced diet, exercise regularly, limit alcohol use, and quit all forms of tobacco/nicotine, if applicable. Regular preventive services are another way to keep healthy. Preventive services (vaccines, screening tests, monitoring & exams) can help personalize your care plan, which helps you manage your own care. Screening tests can find health problems at the earliest stages, when they are easiest to treat. [facility] follows the current, evidence-based guidelines published by the [facility] ([facility]) when recommending preventive services for our patients. Because we follow these guidelines, sometimes recommendations change over time as research supports it. (For example, mammograms used to be recommended annually. Even though Medicare will still pay for an annual mammogram, the newer guidelines recommend a mammogram every two years for women of average risk). Of course, you and your doctor may decide to screen more often for some diseases, based on your risk and your co-morbidities (chronic disease you are already diagnosed with). Preventive services for you include:  - Medicare offers their members a free annual wellness visit, which is time for you and your primary care provider to discuss and plan for your preventive service needs. Take advantage of this benefit every year!  -All adults over the age of 72 should receive the recommended pneumonia vaccines. Current USPSTF guidelines recommend a series of two vaccines for the best pneumonia protection.   -All adults should have a flu vaccine yearly and a tetanus vaccine every 10 years.   -All adults age 48 and older should receive the shingles vaccines (series of two vaccines).       -All adults age 38-68 who are overweight should have a diabetes screening test once every three years.   -All adults born between [birth year] and 1966 should be screened once for Hepatitis C.  -Other screening tests and preventive services for persons with diabetes include: an eye exam to screen for diabetic retinopathy, a kidney function test, a foot exam, and stricter control over your cholesterol.   -Cardiovascular screening for adults with routine risk involves an electrocardiogram (ECG) at intervals determined by your doctor.   -Colorectal cancer screenings should be done for adults age 54-65 with no increased risk factors for colorectal cancer. There are a number of acceptable methods of screening for this type of cancer. Each test has its own benefits and drawbacks. Discuss with your doctor what is most appropriate for you during your annual wellness visit. The different tests include: colonoscopy (considered the best screening method), a fecal occult blood test, a fecal DNA test, and sigmoidoscopy.    -A bone mass density test is recommended when a woman turns 65 to screen for osteoporosis. This test is only recommended one time, as a screening. Some providers will use this same test as a disease monitoring tool if you already have osteoporosis. -Breast cancer screenings are recommended every other year for women of normal risk, age 54-69.  -Cervical cancer screenings for women over age 72 are only recommended with certain risk factors.      Here is a list of your current Health Maintenance items (your personalized list of preventive services) with a due date:  Health Maintenance Due   Topic Date Due    Hepatitis C Test  1955    Shingles Vaccine (1 of 2) 01/18/2004    Colon Cancer Stool Test  01/18/2004    Diabetic Foot Care  05/18/2018    Bone Mineral Density   01/18/2019    Annual Well Visit  06/06/2019    Albumin Urine Test  03/06/2020    Glaucoma Screening   07/03/2020    Eye Exam  07/03/2020    Hemoglobin A1C    07/19/2020    Pneumococcal Vaccine (2 of 2 - PPSV23) 07/23/2020    Flu Vaccine 08/01/2020    Cholesterol Test   07/19/2020

## 2020-09-16 ENCOUNTER — TELEPHONE (OUTPATIENT)
Dept: PHARMACY | Age: 65
End: 2020-09-16

## 2020-09-16 NOTE — TELEPHONE ENCOUNTER
Laly Sauer MD - would patient benefit from statin therapy? Please consider adding rosuvastatin 20 mg daily to your patient's regimen for the following reason:  ADA Guidelines:    >/= 36years old:   o History of ASCVD or 10-year ASCVD risk > 20% - high-intensity statin is recommended. (patient's calculated risk is 20.2%)  Patient's most recent ALT is 14 U/L. Labs/follow-up per provider discretion. I can contact patient/family to discuss any changes in therapy. No future appointments. Thank you,  Elysia London, PharmD, [facility], [address] [facility] Clinical Pharmacist  O: [phone]  Department, toll free: [phone], option 7   ==============================================================  CLINICAL PHARMACY: STATIN REVIEW    SUBJECTIVE:   Identified as DM care gap for [employer]: statin therapy.      OBJECTIVE:  Allergies   Allergen Reactions    Metformin Nausea Only       Medications per current medication list:  Current Outpatient Medications   Medication Sig Dispense Refill    atenoloL (TENORMIN) 100 mg tablet Take 1 tablet by mouth once daily 90 Tab 3    Januvia 100 mg tablet Take 1 tablet by mouth once daily 90 Tab 3    benazepriL (LOTENSIN) 40 mg tablet Take 1 tablet by mouth once daily 90 Tab 3    hydroCHLOROthiazide (HYDRODIURIL) 25 mg tablet TAKE 1 TABLET BY MOUTH IN THE MORNING 90 Tab 3    amLODIPine (NORVASC) 10 mg tablet Take 1 tablet by mouth once daily 90 Tab 3    levothyroxine (SYNTHROID) 125 mcg tablet TAKE 1 TABLET BY MOUTH ONCE DAILY 90 Tab 3    Blood-Glucose Meter monitoring kit Test daily as directed  Dx E11.9 1 Kit 0    glucose blood VI test strips (blood glucose test) strip Test daily as directed  Dx E11.9 100 Strip 3    glipiZIDE SR (GLUCOTROL XL) 10 mg CR tablet TAKE 2 TABLETS BY MOUTH ONCE DAILY IN THE MORNING 180 Tab 3    insulin glargine (LANTUS U-100 INSULIN) 100 unit/mL injection INJECT 60 UNITS SUBCUTANEOUSLY TWICE DAILY 11 Vial 3    naproxen sodium (ANAPROX) 550 mg tablet TAKE 1 TABLET BY MOUTH TWICE DAILY AS NEEDED FOR PAIN 60 Tab 5    guaiFENesin-codeine (CHERATUSSIN AC) 100-10 mg/5 mL solution 1 or 2 tsp q 6 hrs prn cough 120 mL 1       Labs:  Lab Results   Component Value Date/Time    Cholesterol, total 173 2020 09:20 AM    HDL Cholesterol 31 (L) 2020 09:20 AM    LDL, calculated 111.8 (H) 2020 09:20 AM    VLDL, calculated 30.2 2020 09:20 AM    Triglyceride 151 (H) 2020 09:20 AM    CHOL/HDL Ratio 5.6 (H) 2020 09:20 AM     ALT (SGPT)   Date Value Ref Range Status   2020 14 13 - 56 U/L Final     AST (SGOT)   Date Value Ref Range Status   2020 <3 (L) 10 - 38 U/L Final       ASCVD risk:  20.2%    BP Readings from Last 1 Encounters:   20 138/80       Social History     Tobacco Use    Smoking status: Former Smoker     Types: Cigarettes     Last attempt to quit: 2013     Years since quittin.7    Smokeless tobacco: Never Used   Substance Use Topics    Alcohol use: Yes     Comment: occasional         ASSESSMENT:  Hyperlipidemia Goal: Patient is not prescribed high-intensity statin therapy. ADA Guidelines:    >/= 36years old:   o History of ASCVD or 10-year ASCVD risk > 20% - high-intensity statin is recommended. Patient's most recent ALT is 14 U/L. PLAN:  Please consider adding rosuvastatin 20 mg daily to your patient's regimen for the following reason:  ADA Guidelines:    >/= 36years old:   o History of ASCVD or 10-year ASCVD risk > 20% - high-intensity statin is recommended. (patient's calculated risk is 20.2%)  Patient's most recent ALT is 14 U/L.        Thank you,  Demi Will, PharmD, [facility], [address] Pharmacist  O: [phone]  Department, toll free: [phone], option 7

## 2020-09-22 NOTE — TELEPHONE ENCOUNTER
Lashay Bourne MD - would patient benefit from statin therapy?     Please consider adding rosuvastatin 20 mg daily to your patient's regimen for the following reason:  ADA Guidelines:  ·  >/= 36years old:   ?  History of ASCVD or 10-year ASCVD risk > 20% - high-intensity statin is recommended. (patient's calculated risk is 20.2%)  Patient's most recent ALT is 14 U/L.     Labs/follow-up per provider discretion.      I can contact patient/family to discuss any changes in therapy.     No future appointments.     Thank you,  Bev Orona, PharmD, [facility], [address] Pharmacist  O: [phone]  Department, toll free: [phone], option 7

## 2020-10-05 RX ORDER — INSULIN GLARGINE 100 [IU]/ML
INJECTION, SOLUTION SUBCUTANEOUS
Qty: 11 VIAL | Refills: 3 | Status: SHIPPED | OUTPATIENT
Start: 2020-10-05 | End: 2021-10-11 | Stop reason: SDUPTHER

## 2020-11-11 ENCOUNTER — TELEPHONE (OUTPATIENT)
Dept: FAMILY MEDICINE CLINIC | Age: 65
End: 2020-11-11

## 2020-11-11 RX ORDER — METOPROLOL SUCCINATE 50 MG/1
50 TABLET, EXTENDED RELEASE ORAL DAILY
Qty: 90 TAB | Refills: 1 | Status: SHIPPED | OUTPATIENT
Start: 2020-11-11 | End: 2021-05-12 | Stop reason: ALTCHOICE

## 2020-11-11 NOTE — TELEPHONE ENCOUNTER
I contacted patient, name and  were verified, and advised her of the med change. Patient verbalized understanding. No further action needed.

## 2020-11-11 NOTE — TELEPHONE ENCOUNTER
Per pharmacy - Atenolol (all strengths) are on backorder. Pharmacy is requesting an alternative. Would you like to change to something else? Or should this med be located at another pharmacy?      Please advise

## 2020-12-30 NOTE — TELEPHONE ENCOUNTER
CLINICAL PHARMACY CONSULT: MED RECONCILIATION/REVIEW ADDENDUM    For Pharmacy Admin Tracking Only  PHSO: PHSO Patient?: Yes  Total # of Interventions Recommended: Count: 1  - New Order #: 1 New Medication Order Reason(s): Needs Additional Medication Therapy  - Maintenance Safety Lab Monitoring #: 1  Recommended intervention potential cost savings: 0  Total Interventions Accepted: 0  Time Spent (min): Grady Haskins, [facility], PharmD  [address]

## 2021-04-14 DIAGNOSIS — E03.9 ACQUIRED HYPOTHYROIDISM: ICD-10-CM

## 2021-04-14 DIAGNOSIS — Z11.59 NEED FOR HEPATITIS C SCREENING TEST: ICD-10-CM

## 2021-04-14 DIAGNOSIS — E11.9 TYPE 2 DIABETES MELLITUS WITHOUT COMPLICATION, WITH LONG-TERM CURRENT USE OF INSULIN (HCC): Primary | ICD-10-CM

## 2021-04-14 DIAGNOSIS — Z79.4 TYPE 2 DIABETES MELLITUS WITHOUT COMPLICATION, WITH LONG-TERM CURRENT USE OF INSULIN (HCC): Primary | ICD-10-CM

## 2021-04-14 NOTE — PROGRESS NOTES
Yuli Be is a 78 y.o. female, evaluated via audio-only technology on 5/12/2021 for Diabetes  . Assessment & Plan:   Diagnoses and all orders for this visit:    1. Type 2 diabetes mellitus without complication, with long-term current use of insulin ([facility] [address]    2. Essential hypertension, benign    3. Acquired hypothyroidism    4. Mixed hyperlipidemia      HTN/T2DM - continue dietary/exercise efforts  Add pravastatin 40 mg every day; pt states atenolol available again (temporary shortage) and requests changing toprol back to atenolol  Hypothyroidism - on replacement  [place]/mammo advised  Otherwise, no change in rx  OV 4 mos or prn  I have explained plan to patient and the patient verbalizes understanding    12  Subjective:   F/u visit for routine evaluation of HTN, T2DM, hypothyroidism  A1C/chol 7.6/173 July 2020 with TSH of .03 (synthroid reduced)  Recent A1C 7.6 with chol of 220; TSH 5.2  No acute issues  Doing well w/o chest/abdominal pain, dyspnea, cough, fever, evidence of GI/ hemorrhage, constitutional complaints        Prior to Admission medications    Medication Sig Start Date End Date Taking? Authorizing Provider   glipiZIDE SR (GLUCOTROL XL) 10 mg CR tablet TAKE 2 TABLETS BY MOUTH ONCE DAILY IN THE MORNING 4/21/21   Delaney Severin, MD   metoprolol succinate (TOPROL-XL) 50 mg XL tablet Take 1 Tab by mouth daily.  11/11/20   Delaney Severin, MD   insulin glargine (Lantus U-100 Insulin) 100 unit/mL injection INJECT 60 UNITS SUBCUTANEOUSLY TWICE DAILY 10/5/20   Delaney Severin, MD   Januvia 100 mg tablet Take 1 tablet by mouth once daily 8/14/20   Delaney Severin, MD   benazepriL (LOTENSIN) 40 mg tablet Take 1 tablet by mouth once daily 8/14/20   Delaney Severin, MD   hydroCHLOROthiazide (HYDRODIURIL) 25 mg tablet TAKE 1 TABLET BY MOUTH IN THE MORNING 8/14/20   Delaney Severin, MD   amLODIPine (NORVASC) 10 mg tablet Take 1 tablet by mouth once daily 8/14/20   Delaney Severin, MD   levothyroxine (SYNTHROID) 125 mcg tablet TAKE 1 TABLET BY MOUTH ONCE DAILY 8/6/20   Darlin Breen MD   Blood-Glucose Meter monitoring kit Test daily as directed  Dx E11.9 5/1/20   Darlin Breen MD   glucose blood VI test strips (blood glucose test) strip Test daily as directed  Dx E11.9 5/1/20   Darlni Breen MD   naproxen sodium (ANAPROX) 550 mg tablet TAKE 1 TABLET BY MOUTH TWICE DAILY AS NEEDED FOR PAIN 2/28/19   Darlin Breen MD   guaiFENesin-codeine [facility]) 100-10 mg/5 mL solution 1 or 2 tsp q 6 hrs prn cough 3/5/18   Darlin Breen MD     Current Outpatient Medications   Medication Sig Dispense Refill    glipiZIDE SR (GLUCOTROL XL) 10 mg CR tablet TAKE 2 TABLETS BY MOUTH ONCE DAILY IN THE MORNING 180 Tab 0    metoprolol succinate (TOPROL-XL) 50 mg XL tablet Take 1 Tab by mouth daily.  90 Tab 1    insulin glargine (Lantus U-100 Insulin) 100 unit/mL injection INJECT 60 UNITS SUBCUTANEOUSLY TWICE DAILY 11 Vial 3    Januvia 100 mg tablet Take 1 tablet by mouth once daily 90 Tab 3    benazepriL (LOTENSIN) 40 mg tablet Take 1 tablet by mouth once daily 90 Tab 3    hydroCHLOROthiazide (HYDRODIURIL) 25 mg tablet TAKE 1 TABLET BY MOUTH IN THE MORNING 90 Tab 3    amLODIPine (NORVASC) 10 mg tablet Take 1 tablet by mouth once daily 90 Tab 3    levothyroxine (SYNTHROID) 125 mcg tablet TAKE 1 TABLET BY MOUTH ONCE DAILY 90 Tab 3    Blood-Glucose Meter monitoring kit Test daily as directed  Dx E11.9 1 Kit 0    glucose blood VI test strips (blood glucose test) strip Test daily as directed  Dx E11.9 100 Strip 3    naproxen sodium (ANAPROX) 550 mg tablet TAKE 1 TABLET BY MOUTH TWICE DAILY AS NEEDED FOR PAIN 60 Tab 5    guaiFENesin-codeine (CHERATUSSIN AC) 100-10 mg/5 mL solution 1 or 2 tsp q 6 hrs prn cough 120 mL 1     Allergies   Allergen Reactions    Metformin Nausea Only     Past Medical History:   Diagnosis Date    Breast cyst 3/2013    rt breast    Cholelithiasis     [facility]     Endometriosis     Esophagitis, unspecified     Generalized osteoarthrosis, involving multiple sites     Gross hematuria     Hematuria, unspecified     Hypertension     Incisional hernia without mention of obstruction or gangrene     Morbid obesity ([address]     Pneumonia     Unspecified hypothyroidism     Unspecified venous (peripheral) insufficiency     Urolithiasis     Urolithiasis      Past Surgical History:   Procedure Laterality Date    HX COLONOSCOPY  4/2005    (vidhya) neg    HX HERNIA REPAIR Right     abdominal hernia, right side    HX HYSTERECTOMY  1994    HX OTHER SURGICAL      pilonidal cyst excision x 2    ID BIOPSY OF BREAST, INCISIONAL  Jan 2012    right breast       ROS per HPI    No flowsheet data found. Tanvi Hebert, who was evaluated through a patient-initiated, synchronous (real-time) audio only encounter, and/or her healthcare decision maker, is aware that it is a billable service, with coverage as determined by her insurance carrier. She provided verbal consent to proceed: Yes. She has not had a related appointment within my department in the past 7 days or scheduled within the next 24 hours.       Total Time: 20 min    Wanda Will MD

## 2021-04-21 RX ORDER — GLIPIZIDE 10 MG/1
TABLET, FILM COATED, EXTENDED RELEASE ORAL
Qty: 180 TAB | Refills: 0 | Status: SHIPPED | OUTPATIENT
Start: 2021-04-21 | End: 2021-07-16 | Stop reason: SDUPTHER

## 2021-05-06 ENCOUNTER — HOSPITAL ENCOUNTER (OUTPATIENT)
Dept: LAB | Age: 66
Discharge: HOME | End: 2021-05-06
Payer: MEDICARE

## 2021-05-06 ENCOUNTER — APPOINTMENT (OUTPATIENT)
Dept: FAMILY MEDICINE CLINIC | Age: 66
End: 2021-05-06

## 2021-05-06 DIAGNOSIS — Z11.59 NEED FOR HEPATITIS C SCREENING TEST: ICD-10-CM

## 2021-05-06 DIAGNOSIS — Z79.4 TYPE 2 DIABETES MELLITUS WITHOUT COMPLICATION, WITH LONG-TERM CURRENT USE OF INSULIN (HCC): ICD-10-CM

## 2021-05-06 DIAGNOSIS — E11.9 TYPE 2 DIABETES MELLITUS WITHOUT COMPLICATION, WITH LONG-TERM CURRENT USE OF INSULIN (HCC): ICD-10-CM

## 2021-05-06 DIAGNOSIS — E03.9 ACQUIRED HYPOTHYROIDISM: ICD-10-CM

## 2021-05-06 LAB
ALBUMIN SERPL-MCNC: 3.6 G/DL (ref 3.4–5)
ALBUMIN/GLOB SERPL: 1 {RATIO} (ref 0.8–1.7)
ALP SERPL-CCNC: 85 U/L (ref 45–117)
ALT SERPL-CCNC: 15 U/L (ref 13–56)
ANION GAP SERPL CALC-SCNC: 7 MMOL/L (ref 3–18)
AST SERPL-CCNC: 11 U/L (ref 10–38)
BILIRUB SERPL-MCNC: 0.7 MG/DL (ref 0.2–1)
BUN SERPL-MCNC: 19 MG/DL (ref 7–18)
BUN/CREAT SERPL: 21 (ref 12–20)
CALCIUM SERPL-MCNC: 8.8 MG/DL (ref 8.5–10.1)
CHLORIDE SERPL-SCNC: 106 MMOL/L (ref 100–111)
CHOLEST SERPL-MCNC: 220 MG/DL
CO2 SERPL-SCNC: 26 MMOL/L (ref 21–32)
CREAT SERPL-MCNC: 0.89 MG/DL (ref 0.6–1.3)
EST. AVERAGE GLUCOSE BLD GHB EST-MCNC: 171 MG/DL
GLOBULIN SER CALC-MCNC: 3.5 G/DL (ref 2–4)
GLUCOSE SERPL-MCNC: 79 MG/DL (ref 74–99)
HBA1C MFR BLD: 7.6 % (ref 4.2–5.6)
HCV AB SER IA-ACNC: 0.14 INDEX
HCV AB SERPL QL IA: NEGATIVE
HCV COMMENT,HCGAC: NORMAL
HDLC SERPL-MCNC: 35 MG/DL (ref 40–60)
HDLC SERPL: 6.3 {RATIO} (ref 0–5)
LDLC SERPL CALC-MCNC: 149.4 MG/DL (ref 0–100)
LIPID PROFILE,FLP: ABNORMAL
POTASSIUM SERPL-SCNC: 4.3 MMOL/L (ref 3.5–5.5)
PROT SERPL-MCNC: 7.1 G/DL (ref 6.4–8.2)
SODIUM SERPL-SCNC: 139 MMOL/L (ref 136–145)
TRIGL SERPL-MCNC: 178 MG/DL (ref ?–150)
TSH SERPL DL<=0.05 MIU/L-ACNC: 5.22 UIU/ML (ref 0.36–3.74)
VLDLC SERPL CALC-MCNC: 35.6 MG/DL

## 2021-05-06 PROCEDURE — 86803 HEPATITIS C AB TEST: CPT

## 2021-05-06 PROCEDURE — 80053 COMPREHEN METABOLIC PANEL: CPT

## 2021-05-06 PROCEDURE — 84443 ASSAY THYROID STIM HORMONE: CPT

## 2021-05-06 PROCEDURE — 80061 LIPID PANEL: CPT

## 2021-05-06 PROCEDURE — 36415 COLL VENOUS BLD VENIPUNCTURE: CPT

## 2021-05-06 PROCEDURE — 83036 HEMOGLOBIN GLYCOSYLATED A1C: CPT

## 2021-05-12 ENCOUNTER — VIRTUAL VISIT (OUTPATIENT)
Dept: FAMILY MEDICINE CLINIC | Age: 66
End: 2021-05-12
Payer: MEDICARE

## 2021-05-12 DIAGNOSIS — E03.9 ACQUIRED HYPOTHYROIDISM: ICD-10-CM

## 2021-05-12 DIAGNOSIS — E11.9 TYPE 2 DIABETES MELLITUS WITHOUT COMPLICATION, WITH LONG-TERM CURRENT USE OF INSULIN (HCC): Primary | ICD-10-CM

## 2021-05-12 DIAGNOSIS — I10 ESSENTIAL HYPERTENSION, BENIGN: ICD-10-CM

## 2021-05-12 DIAGNOSIS — Z79.4 TYPE 2 DIABETES MELLITUS WITHOUT COMPLICATION, WITH LONG-TERM CURRENT USE OF INSULIN (HCC): Primary | ICD-10-CM

## 2021-05-12 DIAGNOSIS — E78.2 MIXED HYPERLIPIDEMIA: ICD-10-CM

## 2021-05-12 PROCEDURE — 99442 PR PHYS/QHP TELEPHONE EVALUATION 11-20 MIN: CPT | Performed by: INTERNAL MEDICINE

## 2021-05-12 RX ORDER — PRAVASTATIN SODIUM 40 MG/1
40 TABLET ORAL
Qty: 90 TAB | Refills: 1 | Status: SHIPPED | OUTPATIENT
Start: 2021-05-12 | End: 2021-11-17 | Stop reason: SDUPTHER

## 2021-05-12 RX ORDER — ATENOLOL 100 MG/1
TABLET ORAL
Qty: 90 TAB | Refills: 1 | Status: SHIPPED | OUTPATIENT
Start: 2021-05-12 | End: 2021-11-12 | Stop reason: SDUPTHER

## 2021-07-16 NOTE — TELEPHONE ENCOUNTER
Last Visit: 5/12/21 with MD Braeden Ch  Next Appointment: 8/2/21 with ALLEN Morel  Previous Refill Encounter(s): 4/21/21 #180    Requested Prescriptions     Pending Prescriptions Disp Refills    glipiZIDE SR (GLUCOTROL XL) 10 mg CR tablet 180 Tablet 0     Sig: Take 2 Tablets by mouth every morning.

## 2021-07-17 RX ORDER — GLIPIZIDE 10 MG/1
20 TABLET, FILM COATED, EXTENDED RELEASE ORAL
Qty: 180 TABLET | Refills: 0 | Status: SHIPPED | OUTPATIENT
Start: 2021-07-17 | End: 2021-09-16 | Stop reason: SDUPTHER

## 2021-07-26 ENCOUNTER — TELEPHONE (OUTPATIENT)
Dept: FAMILY MEDICINE CLINIC | Age: 66
End: 2021-07-26

## 2021-07-26 NOTE — TELEPHONE ENCOUNTER
Former patient of . Patient states that she was told by her insurance company that [facility] does not take her insurance and that a hold needed to be lifted from so it will allow them to list  as her pcp.  Patient had appt scheduled with sybil but wanted to cancel

## 2021-07-30 RX ORDER — BENAZEPRIL HYDROCHLORIDE 40 MG/1
40 TABLET ORAL DAILY
Qty: 90 TABLET | Refills: 0 | Status: SHIPPED | OUTPATIENT
Start: 2021-07-30 | End: 2021-11-12 | Stop reason: SDUPTHER

## 2021-07-30 RX ORDER — HYDROCHLOROTHIAZIDE 25 MG/1
25 TABLET ORAL
Qty: 90 TABLET | Refills: 0 | Status: SHIPPED | OUTPATIENT
Start: 2021-07-30 | End: 2021-11-12 | Stop reason: SDUPTHER

## 2021-07-30 NOTE — TELEPHONE ENCOUNTER
Last Visit: 5/12/21 with MD Becka Ceja  Next Appointment: none  Previous Refill Encounter(s): 8/14/20 #90 with 3 refills    Requested Prescriptions     Pending Prescriptions Disp Refills    hydroCHLOROthiazide (HYDRODIURIL) 25 mg tablet 90 Tablet 0     Sig: Take 1 Tablet by mouth every morning.  benazepriL (LOTENSIN) 40 mg tablet 90 Tablet 0     Sig: Take 1 Tablet by mouth daily.

## 2021-07-30 NOTE — TELEPHONE ENCOUNTER
Pharmacy is also requesting Norvasc & Januvia    Requested Prescriptions     Pending Prescriptions Disp Refills    amLODIPine (NORVASC) 10 mg tablet 90 Tablet 0     Sig: Take 1 Tablet by mouth daily.  SITagliptin (Januvia) 100 mg tablet 90 Tablet 0     Sig: Take 1 Tablet by mouth daily. Signed Prescriptions Disp Refills    hydroCHLOROthiazide (HYDRODIURIL) 25 mg tablet 90 Tablet 0     Sig: Take 1 Tablet by mouth every morning. Authorizing Provider: Chuck Mendoza benazepriL (LOTENSIN) 40 mg tablet 90 Tablet 0     Sig: Take 1 Tablet by mouth daily.      Authorizing Provider: Mirza Tovar

## 2021-08-06 RX ORDER — AMLODIPINE BESYLATE 10 MG/1
10 TABLET ORAL DAILY
Qty: 30 TABLET | Refills: 0 | Status: SHIPPED | OUTPATIENT
Start: 2021-08-06 | End: 2021-09-16

## 2021-09-16 NOTE — TELEPHONE ENCOUNTER
Patient states her appt with the new PCP was moved to November 2nd. She does not have enough medication to last until then. I have pended a 60 d/s to hold her until that appt. Please sign if appropriate. Requested Prescriptions     Pending Prescriptions Disp Refills    Januvia 100 mg tablet [Pharmacy Med Name: Januvia 100 MG Oral Tablet] 60 Tablet 0     Sig: Take 1 tablet by mouth once daily    amLODIPine (NORVASC) 10 mg tablet [Pharmacy Med Name: amLODIPine Besylate 10 MG Oral Tablet] 60 Tablet 0     Sig: Take 1 tablet by mouth once daily    levothyroxine (SYNTHROID) 125 mcg tablet 60 Tablet 0     Sig: TAKE 1 TABLET BY MOUTH ONCE DAILY    glipiZIDE SR (GLUCOTROL XL) 10 mg CR tablet 120 Tablet 0     Sig: Take 2 Tablets by mouth every morning.

## 2021-09-17 RX ORDER — SITAGLIPTIN 100 MG/1
TABLET, FILM COATED ORAL
Qty: 60 TABLET | Refills: 0 | Status: SHIPPED | OUTPATIENT
Start: 2021-09-17 | End: 2021-11-12 | Stop reason: SDUPTHER

## 2021-09-17 RX ORDER — GLIPIZIDE 10 MG/1
20 TABLET, FILM COATED, EXTENDED RELEASE ORAL
Qty: 120 TABLET | Refills: 0 | Status: SHIPPED | OUTPATIENT
Start: 2021-09-17 | End: 2022-01-05 | Stop reason: SDUPTHER

## 2021-09-17 RX ORDER — LEVOTHYROXINE SODIUM 125 UG/1
TABLET ORAL
Qty: 60 TABLET | Refills: 0 | Status: SHIPPED | OUTPATIENT
Start: 2021-09-17 | End: 2021-12-03 | Stop reason: SDUPTHER

## 2021-09-17 RX ORDER — AMLODIPINE BESYLATE 10 MG/1
TABLET ORAL
Qty: 60 TABLET | Refills: 0 | Status: SHIPPED | OUTPATIENT
Start: 2021-09-17 | End: 2021-11-12 | Stop reason: SDUPTHER

## 2021-09-23 ENCOUNTER — TELEPHONE (OUTPATIENT)
Dept: PHARMACY | Age: 66
End: 2021-09-23

## 2021-09-23 NOTE — TELEPHONE ENCOUNTER
Per [address], benazepril last filled 7/30 and picked up 7/31 for 90 day supply. States this was processed through [facility] and patient paid $10.  Prior to this, medication was filled 4/20 and picked up in May- processed through insurance and was only $1.80. Unsure why 7/30 fill was through [employer] card that cost patient more. States too late to reverse claim. Given patient picked up last 2 Rx's in May and end July, should have enough to last until new provider appointment. If patient finds she does not, she should contact office ahead of time. No further outreach planned at this time.     Carlos Manuel Ruano, PharmD, [facility], [address] Pharmacist  Department, toll free: [phone], option 2

## 2021-09-23 NOTE — TELEPHONE ENCOUNTER
CLINICAL PHARMACY: ADHERENCE REVIEW  Identified care gap per Ber[place]; fills at [facility]: ACE/ARB adherence    Last Visit: 05/12/2021    Patient also appears to be prescribed:   Januvia 100mg  Amlodipine 10mg  Glipizide SR 10mg  Pravastatin 40mg    Patient not found in Outcomes MTM    ASSESSMENT  ACE/ARB ADHERENCE    Per [address]:   Benazepril 40mg last picked up on 07/30/2021 for 90 day supply. 0 refills remaining. Billed through [facility]    BP Readings from Last 3 Encounters:   08/06/20 138/80   07/23/19 134/80   03/07/19 130/80     CrCl cannot be calculated (Unknown ideal weight.). DIABETES ADHERENCE    Per [facility]:   Januvia 100mg last picked up on 09/19/2021 for 60 day supply. 0 refills remaining. Billed through [facility] ([email or web address])     Per [address]:   Glipizide SR 10mg last picked up on 09/19/2021 for 60 day supply. 0 refills remaining. Billed through [facility] Rx Value Date/Time    Hemoglobin A1c 7.6 (H) 05/06/2021 10:18 AM    Hemoglobin A1c 7.6 (H) 07/28/2020 09:20 AM    Hemoglobin A1c 6.7 (H) 07/19/2019 08:32 AM     NOTE A1c <9%    STATIN ADHERENCE    Per [facility]:   Pravastain 40mg last picked up on 08/10/2021 for 90 day supply. 0 refills remaining.  Billed through [facility] Rx Value Date/Time    Cholesterol, total 220 (H) 05/06/2021 10:18 AM    HDL Cholesterol 35 (L) 05/06/2021 10:18 AM    LDL, calculated 149.4 (H) 05/06/2021 10:18 AM    VLDL, calculated 35.6 05/06/2021 10:18 AM    Triglyceride 178 (H) 05/06/2021 10:18 AM    CHOL/HDL Ratio 6.3 (H) 05/06/2021 10:18 AM     ALT (SGPT)   Date Value Ref Range Status   05/06/2021 15 13 - 56 U/L Final     AST (SGOT)   Date Value Ref Range Status   05/06/2021 11 10 - 38 U/L Final     The 10-year ASCVD risk score (Kenyetta Sr, et al., 2013) is: 23.2%    Values used to calculate the score:      Age: 78 years      Sex: Female      Is Non- : No      Diabetic: Yes      Tobacco smoker: No      Systolic Blood Pressure: 061 mmHg      Is BP treated: Yes      HDL Cholesterol: 35 MG/DL      Total Cholesterol: 220 MG/DL     PLAN  The following are interventions that have been identified:  - Patient needs refills for Benazapril 40mg Patient will not have enough to last until appointment with MD. Please reach out to MD for a script to last patient until appointment on 11/2/2021. No patient out reach planned at this time.       Future Appointments   Date Time Provider Nithya Champion   11/2/2021  1:20 PM Tona Siemens, MD [facility] [address]  Direct: [phone]  Department, toll free:[phone], Option 2

## 2021-09-24 NOTE — TELEPHONE ENCOUNTER
For Pharmacy [address] in place: No   Recommendation Provided To: Pharmacy: 1   Intervention Detail: Adherence Monitorin   Gap Closed?: Yes   Intervention Accepted By: Pharmacy: 1   Time Spent (min): 20

## 2021-10-13 RX ORDER — INSULIN GLARGINE 100 [IU]/ML
60 INJECTION, SOLUTION SUBCUTANEOUS 2 TIMES DAILY
Qty: 40 ML | Refills: 0 | Status: SHIPPED | OUTPATIENT
Start: 2021-10-13 | End: 2021-11-17 | Stop reason: SDUPTHER

## 2021-11-02 ENCOUNTER — OFFICE VISIT (OUTPATIENT)
Dept: FAMILY MEDICINE CLINIC | Age: 66
End: 2021-11-02
Payer: MEDICARE

## 2021-11-02 VITALS
BODY MASS INDEX: 43.77 KG/M2 | TEMPERATURE: 97.1 F | WEIGHT: 247 LBS | HEART RATE: 56 BPM | SYSTOLIC BLOOD PRESSURE: 165 MMHG | RESPIRATION RATE: 16 BRPM | OXYGEN SATURATION: 95 % | DIASTOLIC BLOOD PRESSURE: 62 MMHG | HEIGHT: 63 IN

## 2021-11-02 DIAGNOSIS — E66.01 OBESITY, CLASS III, BMI 40-49.9 (MORBID OBESITY) (HCC): ICD-10-CM

## 2021-11-02 DIAGNOSIS — Z23 NEEDS FLU SHOT: ICD-10-CM

## 2021-11-02 DIAGNOSIS — Z13.1 SCREENING FOR DIABETES MELLITUS: ICD-10-CM

## 2021-11-02 DIAGNOSIS — M89.9 DISORDER OF BONE AND CARTILAGE: ICD-10-CM

## 2021-11-02 DIAGNOSIS — Z79.4 TYPE 2 DIABETES MELLITUS WITHOUT COMPLICATION, WITH LONG-TERM CURRENT USE OF INSULIN (HCC): ICD-10-CM

## 2021-11-02 DIAGNOSIS — Z12.11 SCREEN FOR COLON CANCER: ICD-10-CM

## 2021-11-02 DIAGNOSIS — E11.9 TYPE 2 DIABETES MELLITUS WITHOUT COMPLICATION, WITH LONG-TERM CURRENT USE OF INSULIN (HCC): ICD-10-CM

## 2021-11-02 DIAGNOSIS — Z12.31 ENCOUNTER FOR SCREENING MAMMOGRAM FOR MALIGNANT NEOPLASM OF BREAST: ICD-10-CM

## 2021-11-02 DIAGNOSIS — Z13.31 SCREENING FOR DEPRESSION: ICD-10-CM

## 2021-11-02 DIAGNOSIS — Z00.00 MEDICARE ANNUAL WELLNESS VISIT, SUBSEQUENT: Primary | ICD-10-CM

## 2021-11-02 DIAGNOSIS — E78.2 MIXED HYPERLIPIDEMIA: ICD-10-CM

## 2021-11-02 DIAGNOSIS — Z78.0 POSTMENOPAUSAL STATE: ICD-10-CM

## 2021-11-02 DIAGNOSIS — Z13.6 SCREENING FOR ISCHEMIC HEART DISEASE: ICD-10-CM

## 2021-11-02 DIAGNOSIS — I10 ESSENTIAL HYPERTENSION, BENIGN: ICD-10-CM

## 2021-11-02 DIAGNOSIS — E03.9 ACQUIRED HYPOTHYROIDISM: ICD-10-CM

## 2021-11-02 DIAGNOSIS — M94.9 DISORDER OF BONE AND CARTILAGE: ICD-10-CM

## 2021-11-02 DIAGNOSIS — Z23 ENCOUNTER FOR IMMUNIZATION: ICD-10-CM

## 2021-11-02 PROCEDURE — G9899 SCRN MAM PERF RSLTS DOC: HCPCS | Performed by: EMERGENCY MEDICINE

## 2021-11-02 PROCEDURE — 1101F PT FALLS ASSESS-DOCD LE1/YR: CPT | Performed by: EMERGENCY MEDICINE

## 2021-11-02 PROCEDURE — G0008 ADMIN INFLUENZA VIRUS VAC: HCPCS | Performed by: EMERGENCY MEDICINE

## 2021-11-02 PROCEDURE — G8417 CALC BMI ABV UP PARAM F/U: HCPCS | Performed by: EMERGENCY MEDICINE

## 2021-11-02 PROCEDURE — G8753 SYS BP > OR = 140: HCPCS | Performed by: EMERGENCY MEDICINE

## 2021-11-02 PROCEDURE — G8510 SCR DEP NEG, NO PLAN REQD: HCPCS | Performed by: EMERGENCY MEDICINE

## 2021-11-02 PROCEDURE — 3051F HG A1C>EQUAL 7.0%<8.0%: CPT | Performed by: EMERGENCY MEDICINE

## 2021-11-02 PROCEDURE — 1090F PRES/ABSN URINE INCON ASSESS: CPT | Performed by: EMERGENCY MEDICINE

## 2021-11-02 PROCEDURE — G8754 DIAS BP LESS 90: HCPCS | Performed by: EMERGENCY MEDICINE

## 2021-11-02 PROCEDURE — 90694 VACC AIIV4 NO PRSRV 0.5ML IM: CPT | Performed by: EMERGENCY MEDICINE

## 2021-11-02 PROCEDURE — G8400 PT W/DXA NO RESULTS DOC: HCPCS | Performed by: EMERGENCY MEDICINE

## 2021-11-02 PROCEDURE — G8427 DOCREV CUR MEDS BY ELIG CLIN: HCPCS | Performed by: EMERGENCY MEDICINE

## 2021-11-02 PROCEDURE — 99203 OFFICE O/P NEW LOW 30 MIN: CPT | Performed by: EMERGENCY MEDICINE

## 2021-11-02 PROCEDURE — 99497 ADVNCD CARE PLAN 30 MIN: CPT | Performed by: EMERGENCY MEDICINE

## 2021-11-02 PROCEDURE — G8536 NO DOC ELDER MAL SCRN: HCPCS | Performed by: EMERGENCY MEDICINE

## 2021-11-02 PROCEDURE — 3017F COLORECTAL CA SCREEN DOC REV: CPT | Performed by: EMERGENCY MEDICINE

## 2021-11-02 PROCEDURE — G0439 PPPS, SUBSEQ VISIT: HCPCS | Performed by: EMERGENCY MEDICINE

## 2021-11-02 PROCEDURE — 2022F DILAT RTA XM EVC RTNOPTHY: CPT | Performed by: EMERGENCY MEDICINE

## 2021-11-02 RX ORDER — ZOSTER VACCINE RECOMBINANT, ADJUVANTED 50 MCG/0.5
KIT INTRAMUSCULAR
Qty: 0.5 ML | Refills: 1 | Status: SHIPPED | OUTPATIENT
Start: 2021-11-02 | End: 2022-02-23

## 2021-11-02 NOTE — PROGRESS NOTES
11/02/21          ICD-10-CM ICD-9-CM    1. Medicare annual wellness visit, subsequent  Z00.00 V70.0 GLUCOSE, RANDOM      CHRIS MAMMO BI SCREENING INCL CAD      DEXA BONE DENSITY STUDY AXIAL      DEPRESSION SCREEN ANNUAL      ADMIN INFLUENZA VIRUS VAC      varicella-zoster recombinant, PF, (Shingrix, PF,) 50 mcg/0.5 mL susr injection      REFERRAL TO OPHTHALMOLOGY      FLU (FLUAD QUAD INFLUENZA VACCINE,QUAD,ADJUVANTED)   2. Essential hypertension, benign  I10 401.1 CBC WITH AUTOMATED DIFF   3. Type 2 diabetes mellitus without complication, with long-term current use of insulin (HCC)  T77.5 418.56 METABOLIC PANEL, COMPREHENSIVE    Z79.4 V58.67 HEMOGLOBIN A1C WITH EAG      MICROALBUMIN, UR, RAND W/ MICROALB/CREAT RATIO   4. Mixed hyperlipidemia  E78.2 272.2 LIPID PANEL   5. Acquired hypothyroidism  E03.9 244.9 TSH 3RD GENERATION   6. Screen for colon cancer  Z12.11 V76.51    7. Screening for diabetes mellitus  Z13.1 V77.1 GLUCOSE, RANDOM   8. Screening for ischemic heart disease  Z13.6 V81.0    9. Encounter for screening mammogram for malignant neoplasm of breast  Z12.31 V76.12 CHRIS MAMMO BI SCREENING INCL CAD   8. Postmenopausal state  Z78.0 V49.81 DEXA BONE DENSITY STUDY AXIAL   11. Disorder of bone and cartilage  M89.9 733.90 DEXA BONE DENSITY STUDY AXIAL    M94.9     12. Screening for depression  Z13.31 V79.0 DEPRESSION SCREEN ANNUAL   13. Encounter for immunization  Z23 V03.89 ADMIN INFLUENZA VIRUS VAC   14. Needs flu shot  Z23 V04.81    15. Obesity, Class III, BMI 40-49.9 (morbid obesity) ([facility] [address]  E66.01 278.01      Follow-up and Dispositions    · Return in about 1 year (around 11/2/2022), or if symptoms worsen or fail to improve, for Wellness visit subsequent, Labs/diagnostics/referrals ordered this visit. Assessment and plan  A Medicare wellness visit and ACP conversation was done today. Essential hypertension. Elevated reading today. We will not change as this is the first visit to us.   Adjust meds if elevated on next visit. Type 2 diabetes mellitus, insulin requiring. Not controlled in May 2021 and not at target of less than 7. Tired Follow-up labs ordered. If elevated with new labs would adjust medication. Mixed hyperlipidemia. Chronic problem. Not controlled in May 2021. Follow-up labs ordered. If still elevated adjust meds. Acquired hypothyroidism. TSH elevated on last visit in May 2021. Repeat level ordered. If TSH elevated would increase the Synthroid. Lab results and schedule of future lab studies reviewed with patient  Diagnostic and radiologic results and the schedule of future studies were reviewed with the patient  reviewed diet, exercise and weight control  All questions were answered and understood. Health Maintenance Due   Topic Date Due    Colorectal Cancer Screening Combo  Never done    Shingrix Vaccine Age 49> (1 of 2) Never done    Foot Exam Q1  05/18/2018    Bone Densitometry (Dexa) Screening  Never done    Eye Exam Retinal or Dilated  07/03/2019    MICROALBUMIN Q1  03/06/2020    Breast Cancer Screen Mammogram  06/12/2020    Medicare Yearly Exam  08/07/2021    Flu Vaccine (1) Never done    A1C test (Diabetic or Prediabetic)  11/06/2021   Has a home kit  Dexa scan  Eyes checked by Dr. Bridget Vásquez. We will obtain the report. Mammogram agreed to  [facility], prior positive screening    Advance Care Planning (ACP) Provider Note - Comprehensive     Date of ACP Conversation: 11/02/21  Persons included in Conversation:  patient  Length of ACP Conversation in minutes: 16 minutes    Authorized Decision Maker (if patient is incapable of making informed decisions):   This person is: Next of Kin by law (only applies in absence of a Healthcare Power of  or Legal Guardian)      She has NO advanced directive  - add't info provided. Reviewed DNR/DNI and patient is not interested. Full code  Would not want prolonged life support.         General ACP for ALL Patients with Decision Making Capacity:  Importance of advance care planning, including choosing a healthcare agent to communicate patient's healthcare decisions if patient lost the ability to make decisions, such as after a sudden illness or accident  Understanding of the healthcare agent role was assessed and information provided  Opportunity offered to explore how cultural, Gnosticism, spiritual, or personal beliefs would affect decisions for future care  Exploration of values, goals, and preferences if recovery is not expected, even with continued medical treatment in the event of: Imminent death or severe, permanent brain injury: \"In these circumstances, what matters most to you? \"  Care focused more on comfort or quality of life. For Serious or Chronic Illness:  Understanding of CPR, goals and expected outcomes, benefits and burdens discussed. Understanding of medical condition  Information on CPR success rates provided (e.g. for CPR in hospital, survival to d/c at two weeks is 22%, for chronically ill or elderly/frail survival is less than 3%)    Interventions Provided:  Recommended communicating the plan and making copies for the healthcare agent, personal physician, and others as appropriate (e.g., health system)  Recommended review of completed ACP document annually or upon change in health status     Advance Care Planning     Subjective:   Carter Botello is a 78 y.o. female has Gross hematuria, Recurrent UTI, Morbid obesity ([address] Incisional hernia, Esophagitis, unspecified, Generalized osteoarthrosis, involving multiple sites, Unspecified venous (peripheral) insufficiency, Hypothyroidism, Essential hypertension, benign, DM type 2 (diabetes mellitus, type 2) ([address] Fibrocystic change of breast, and Mixed hyperlipidemia on their problem list.. No chief complaint on file. The patient presents to [facility] care. Her previous physician has retired.     Hypertension  The patient has no headaches, visual changes, chest pain or pressure,dyspnea, orthopnea, or PND. There is no problem with the medication. Diabetes mellitus  The patient denies polyuria, polydipsia, or polyphagia. The patient denies any other aggravating or relieving factors There has been no problem with the medications. Hyperlipidemia   The patient denies any myalgias or weakness. No abdominal discomfort admitted to. The patient denies any difficulty with or side effects from the medication. BP Readings from Last 3 Encounters:   11/02/21 (!) 165/62   08/06/20 138/80   07/23/19 134/80     Please press want to confirm that you are ready to be connected with hello this is Dr. Marissa Middleton Ms. speak to Ms. Vaughn please all right very good okay to get your date of birth just for confirmation all right how can I help you today I can get the day will questioning what medications are you taking back left back so heavy had any fever or chills 0.2 and with C any discomfort anywhere in the nose or face he has about the left side of back of have you had this before I have I was then treated is a very good he have any other medical problems    Lab Results   Component Value Date/Time    Hemoglobin A1c 7.6 (H) 05/06/2021 10:18 AM     Lab Results   Component Value Date/Time    Cholesterol, total 220 (H) 05/06/2021 10:18 AM    HDL Cholesterol 35 (L) 05/06/2021 10:18 AM    LDL, calculated 149.4 (H) 05/06/2021 10:18 AM    VLDL, calculated 35.6 05/06/2021 10:18 AM    Triglyceride 178 (H) 05/06/2021 10:18 AM    CHOL/HDL Ratio 6.3 (H) 05/06/2021 10:18 AM       Hypothyroidism  The patient denies fatigue, cold intolerance, weight gain, constipation, dry skin, myalgia, constipation, joint aches or menstrual irregularities. There is no history of cognitive , dry skin hoarseness or edema. The patient has not noticed a puffy face, tongue enlargement, or loss of  hair or eyebrows. The patient has no problem with her medication.   Lab Results   Component Value Date/Time    TSH 5.22 (H) 05/06/2021 10:18 AM     Lab Results   Component Value Date/Time    Sodium 139 05/06/2021 10:18 AM    Potassium 4.3 05/06/2021 10:18 AM    Chloride 106 05/06/2021 10:18 AM    CO2 26 05/06/2021 10:18 AM    Anion gap 7 05/06/2021 10:18 AM    Glucose 79 05/06/2021 10:18 AM    BUN 19 (H) 05/06/2021 10:18 AM    Creatinine 0.89 05/06/2021 10:18 AM    BUN/Creatinine ratio 21 (H) 05/06/2021 10:18 AM    GFR est AA >60 05/06/2021 10:18 AM    GFR est non-AA >60 05/06/2021 10:18 AM    Calcium 8.8 05/06/2021 10:18 AM     Lab Results   Component Value Date/Time    WBC 11.0 07/28/2020 09:20 AM    HGB 13.5 07/28/2020 09:20 AM    HCT 40.1 07/28/2020 09:20 AM    PLATELET 166 01/44/7473 09:20 AM    MCV 86.6 07/28/2020 09:20 AM     Smoker   78 y.o.. reports that she quit smoking about 8 years ago. Her smoking use included cigarettes. She has never used smokeless tobacco.  All told smoked 25 years or less, Intermittent smoker. 1 ppd        Current Outpatient Medications   Medication Sig    insulin glargine (Lantus U-100 Insulin) 100 unit/mL injection 60 Units by SubCUTAneous route two (2) times a day. Indications: type 2 diabetes mellitus    Januvia 100 mg tablet Take 1 tablet by mouth once daily    amLODIPine (NORVASC) 10 mg tablet Take 1 tablet by mouth once daily    levothyroxine (SYNTHROID) 125 mcg tablet TAKE 1 TABLET BY MOUTH ONCE DAILY    glipiZIDE SR (GLUCOTROL XL) 10 mg CR tablet Take 2 Tablets by mouth every morning.  hydroCHLOROthiazide (HYDRODIURIL) 25 mg tablet Take 1 Tablet by mouth every morning.  benazepriL (LOTENSIN) 40 mg tablet Take 1 Tablet by mouth daily.  atenoloL (TENORMIN) 100 mg tablet 1 qd    pravastatin (PRAVACHOL) 40 mg tablet Take 1 Tab by mouth nightly.     Blood-Glucose Meter monitoring kit Test daily as directed  Dx E11.9    glucose blood VI test strips (blood glucose test) strip Test daily as directed  Dx E11.9    naproxen sodium (ANAPROX) 550 mg tablet TAKE 1 TABLET BY MOUTH TWICE DAILY AS NEEDED FOR PAIN    guaiFENesin-codeine (CHERATUSSIN AC) 100-10 mg/5 mL solution 1 or 2 tsp q 6 hrs prn cough     No current facility-administered medications for this visit. Allergies   Allergen Reactions    Metformin Nausea Only     has Gross hematuria, Recurrent UTI, Morbid obesity ([address] Incisional hernia, Esophagitis, unspecified, Generalized osteoarthrosis, involving multiple sites, Unspecified venous (peripheral) insufficiency, Hypothyroidism, Essential hypertension, benign, DM type 2 (diabetes mellitus, type 2) ([address] Fibrocystic change of breast, and Mixed hyperlipidemia on their problem list.    Past Surgical History:   Procedure Laterality Date    HX COLONOSCOPY  4/2005    (vidhya) neg    HX HERNIA REPAIR Right     abdominal hernia, right side    HX HYSTERECTOMY  1994    HX OTHER SURGICAL      pilonidal cyst excision x 2    [place] BIOPSY OF BREAST, INCISIONAL  Jan 2012    right breast      reports that she quit smoking about 8 years ago. Her smoking use included cigarettes. She has never used smokeless tobacco. She reports current alcohol use. She reports that she does not use drugs. family history includes Cancer in her daughter and father; Heart Attack in her mother; Heart Disease in an other family member; Lung Disease in her mother; Prostate Cancer (age of onset: 80) in her father. Review of Systems   Constitutional: Negative for chills and fever. HENT: Negative for hearing loss. Eyes: Negative for blurred vision, discharge and redness. Cataracts both eyes   Respiratory: Negative for cough and shortness of breath. Cardiovascular: Positive for leg swelling. Negative for chest pain, palpitations and orthopnea. Cardiac murmur   Gastrointestinal: Negative for abdominal pain, blood in stool, constipation, diarrhea and melena. Right-sided ventral hernia   Genitourinary: Negative for dysuria, frequency and urgency.    Musculoskeletal: Negative for joint pain and myalgias. Skin: Negative for rash (dishydrotic eczema). Neurological: Negative for dizziness, focal weakness and headaches. Psychiatric/Behavioral: Negative for depression. The patient is not nervous/anxious. Visit Vitals  BP (!) 165/62 (BP 1 Location: Left arm, BP Patient Position: Sitting, BP Cuff Size: Large adult)   Pulse (!) 56   Temp 97.1 °F (36.2 °C) (Temporal)   Resp 16   Ht 5' 3\" (1.6 m)   Wt 247 lb (112 kg)   SpO2 95%   BMI 43.75 kg/m²       Physical Exam  Vitals and nursing note reviewed. Constitutional:       Appearance: She is well-developed. HENT:      Head: Normocephalic and atraumatic. Right Ear: External ear normal.      Left Ear: External ear normal.      Nose: Nose normal.   Eyes:      Conjunctiva/sclera: Conjunctivae normal.      Pupils: Pupils are equal, round, and reactive to light. Cardiovascular:      Rate and Rhythm: Regular rhythm. Heart sounds: Murmur (2/6 systolic murmur second right intercostal space) heard. Pulmonary:      Effort: Pulmonary effort is normal. No respiratory distress. Breath sounds: Normal breath sounds. No wheezing or rales. Chest:      Chest wall: No tenderness. Abdominal:      General: Bowel sounds are normal. There is distension (Right-sided distention. Correlate with her aforementioned hernia. She also has a midline scar below the umbilicus. Well-healed). Palpations: Abdomen is soft. There is no mass. Tenderness: There is no abdominal tenderness. There is no guarding or rebound. Musculoskeletal:         General: Normal range of motion. Cervical back: Normal range of motion and neck supple. Skin:     General: Skin is warm and dry. Findings: No erythema or rash. Neurological:      Mental Status: She is alert and oriented to person, place, and time.    Psychiatric:         Behavior: Behavior normal.         Judgment: Judgment normal.        Diabetic foot exam:     Left Foot:   Visual Exam: normal    Pulse DP: 2+ (normal)   Filament test: normal sensation    Vibratory sensation: normal      Right Foot:   Visual Exam: normal    Pulse DP: 2+ (normal)   Filament test: normal sensation    Vibratory sensation: normal        Results for orders placed or performed during the hospital encounter of 05/06/21   LIPID PANEL   Result Value Ref Range    LIPID PROFILE          Cholesterol, total 220 (H) <200 MG/DL    Triglyceride 178 (H) <150 MG/DL    HDL Cholesterol 35 (L) 40 - 60 MG/DL    LDL, calculated 149.4 (H) 0 - 100 MG/DL    VLDL, calculated 35.6 MG/DL    CHOL/HDL Ratio 6.3 (H) 0 - 5.0     METABOLIC PANEL, COMPREHENSIVE   Result Value Ref Range    Sodium 139 136 - 145 mmol/L    Potassium 4.3 3.5 - 5.5 mmol/L    Chloride 106 100 - 111 mmol/L    CO2 26 21 - 32 mmol/L    Anion gap 7 3.0 - 18 mmol/L    Glucose 79 74 - 99 mg/dL    BUN 19 (H) 7.0 - 18 MG/DL    Creatinine 0.89 0.6 - 1.3 MG/DL    BUN/Creatinine ratio 21 (H) 12 - 20      GFR est AA >60 >60 ml/min/1.73m2    GFR est non-AA >60 >60 ml/min/1.73m2    Calcium 8.8 8.5 - 10.1 MG/DL    Bilirubin, total 0.7 0.2 - 1.0 MG/DL    ALT (SGPT) 15 13 - 56 U/L    AST (SGOT) 11 10 - 38 U/L    Alk. phosphatase 85 45 - 117 U/L    Protein, total 7.1 6.4 - 8.2 g/dL    Albumin 3.6 3.4 - 5.0 g/dL    Globulin 3.5 2.0 - 4.0 g/dL    A-G Ratio 1.0 0.8 - 1.7     TSH 3RD GENERATION   Result Value Ref Range    TSH 5.22 (H) 0.36 - 3.74 uIU/mL   HEMOGLOBIN A1C WITH EAG   Result Value Ref Range    Hemoglobin A1c 7.6 (H) 4.2 - 5.6 %    Est. average glucose 171 mg/dL   HEPATITIS C AB   Result Value Ref Range    Hepatitis C virus Ab 0.14 <0.80 Index    Hep C virus Ab Interp. Negative NEG      Hep C  virus Ab comment             We discussed the expected course, resolution and complications of the diagnosis(es) in detail. Medication risks, benefits, costs, interactions, and alternatives were discussed as indicated.   I advised her to contact the office if her condition worsens, changes or fails to improve as anticipated. She expressed understanding with the diagnosis(es) and plan. This note was done with the assistance of dragon speech software.   Some inadvertent errors or omissions may be present

## 2021-11-02 NOTE — PROGRESS NOTES
This is the Subsequent Medicare Annual Wellness Exam, performed 12 months or more after the Initial AWV or the last Subsequent AWV    I have reviewed the patient's medical history in detail and updated the computerized patient record. Assessment/Plan   Education and counseling provided:  Are appropriate based on today's review and evaluation    1. Medicare annual wellness visit, subsequent  -     Micjean carlosberg; Future  -     LIPID PANEL; Future  -     CHRIS MAMMO BI SCREENING INCL CAD; Future  -     DEXA BONE DENSITY STUDY AXIAL; Future  -     DEPRESSION SCREEN ANNUAL  -     FLU (FLUAD QUAD INFLUENZA VACCINE,QUAD,ADJUVANTED)  -     ADMIN INFLUENZA VIRUS VAC  -     varicella-zoster recombinant, PF, (Shingrix, PF,) 50 mcg/0.5 mL susr injection; 0.5mL by IntraMUSCular route once now and then repeat in 2-6 months, Print, Disp-0.5 mL, R-1  -     REFERRAL TO OPHTHALMOLOGY  2. Screen for colon cancer  -     REFERRAL FOR COLONOSCOPY  3. Screening for ischemic heart disease  -     LIPID PANEL; Future  4 Encounter for screening mammogram for malignant neoplasm of breast  -     CHRIS MAMMO BI SCREENING INCL CAD; Future  5. Postmenopausal state  -     DEXA BONE DENSITY STUDY AXIAL; Future  6. Disorder of bone and cartilage  -     DEXA BONE DENSITY STUDY AXIAL; Future  7. Screening for depression  -     DEPRESSION SCREEN ANNUAL  8. Encounter for immunization  -     ADMIN INFLUENZA VIRUS VAC       Depression Risk Factor Screening     3 most recent PHQ Screens 5/12/2021   Little interest or pleasure in doing things -   Feeling down, depressed, irritable, or hopeless Not at all   Total Score PHQ 2 -       Alcohol Risk Screen    Do you average more than 1 drink per night or more than 7 drinks a week:  No    On any one occasion in the past three months have you have had more than 3 drinks containing alcohol:  No        Functional Ability and Level of Safety    Hearing: Hearing is good. Activities of Daily Living: The home contains: no safety equipment. Patient does total self care      Ambulation: with no difficulty     Fall Risk:  Fall Risk Assessment, last 12 mths 3/7/2019   Able to walk? Yes   Fall in past 12 months? No      Abuse Screen:  Patient is not abused       Cognitive Screening    Has your family/caregiver stated any concerns about your memory: no     Cognitive Screening: Cognitive Screening: The patient is oriented x3 and to purpose. Recent and remote memory is intact. Judgment is normal.  The patient has the capacity to make their own decisions.     Health Maintenance Due     Health Maintenance Due   Topic Date Due    Colorectal Cancer Screening Combo  Never done    Shingrix Vaccine Age 49> (1 of 2) Never done    Bone Densitometry (Dexa) Screening  Never done    Eye Exam Retinal or Dilated  07/03/2019    MICROALBUMIN Q1  03/06/2020    Breast Cancer Screen Mammogram  06/12/2020    A1C test (Diabetic or Prediabetic)  11/06/2021       Patient Care Team   Patient Care Team:  None as PCP - General  Alfredo Gudino MD as PCP - [address] [place] [address] Dr Abramsled Provider  Shay Gomez MD (Surgery)  Huyen Kaplan MD (Urology)  Alberto Grimes MD as Consulting Provider (Ophthalmology)    History     Patient Active Problem List   Diagnosis Code    Gross hematuria R31.0    Recurrent UTI N39.0    Morbid obesity ([address] E66.01    Incisional hernia K43.2    Esophagitis, unspecified K20.90    Generalized osteoarthrosis, involving multiple sites M15.9    Unspecified venous (peripheral) insufficiency I87.2    Hypothyroidism E03.9    Essential hypertension, benign I10    DM type 2 (diabetes mellitus, type 2) ([address] E11.9    Fibrocystic change of breast N60.19    Mixed hyperlipidemia E78.2     Past Medical History:   Diagnosis Date    Breast cyst 3/2013    rt breast    Cholelithiasis     Diabetes ([address]     Endometriosis     Esophagitis, unspecified     Generalized osteoarthrosis, involving multiple sites     Gross hematuria     Hematuria, unspecified     Hypertension     Incisional hernia without mention of obstruction or gangrene     Morbid obesity ([address]     Pneumonia     Unspecified hypothyroidism     Unspecified venous (peripheral) insufficiency     Urolithiasis     Urolithiasis       Past Surgical History:   Procedure Laterality Date    HX COLONOSCOPY  4/2005    (vidhya) neg    HX HERNIA REPAIR Right     abdominal hernia, right side    HX HYSTERECTOMY  1994    HX OTHER SURGICAL      pilonidal cyst excision x 2    [place] BIOPSY OF BREAST, INCISIONAL  Jan 2012    right breast     Current Outpatient Medications   Medication Sig Dispense Refill    insulin glargine (Lantus U-100 Insulin) 100 unit/mL injection 60 Units by SubCUTAneous route two (2) times a day. Indications: type 2 diabetes mellitus 40 mL 0    Januvia 100 mg tablet Take 1 tablet by mouth once daily 60 Tablet 0    amLODIPine (NORVASC) 10 mg tablet Take 1 tablet by mouth once daily 60 Tablet 0    levothyroxine (SYNTHROID) 125 mcg tablet TAKE 1 TABLET BY MOUTH ONCE DAILY 60 Tablet 0    glipiZIDE SR (GLUCOTROL XL) 10 mg CR tablet Take 2 Tablets by mouth every morning. 120 Tablet 0    hydroCHLOROthiazide (HYDRODIURIL) 25 mg tablet Take 1 Tablet by mouth every morning. 90 Tablet 0    benazepriL (LOTENSIN) 40 mg tablet Take 1 Tablet by mouth daily. 90 Tablet 0    atenoloL (TENORMIN) 100 mg tablet 1 qd 90 Tab 1    pravastatin (PRAVACHOL) 40 mg tablet Take 1 Tab by mouth nightly.  90 Tab 1    Blood-Glucose Meter monitoring kit Test daily as directed  Dx E11.9 1 Kit 0    glucose blood VI test strips (blood glucose test) strip Test daily as directed  Dx E11.9 100 Strip 3    naproxen sodium (ANAPROX) 550 mg tablet TAKE 1 TABLET BY MOUTH TWICE DAILY AS NEEDED FOR PAIN 60 Tab 5    guaiFENesin-codeine (CHERATUSSIN AC) 100-10 mg/5 mL solution 1 or 2 tsp q 6 hrs prn cough 120 mL 1     Allergies   Allergen Reactions    Metformin Nausea Only       Family History   Problem Relation Age of Onset    Lung Disease Mother     Heart Attack Mother     Prostate Cancer Father 80    Cancer Father     Heart Disease Other     Cancer Daughter      Social History     Tobacco Use    Smoking status: Former Smoker     Types: Cigarettes     Quit date: 2013     Years since quittin.8    Smokeless tobacco: Never Used   Substance Use Topics    Alcohol use: Yes     Comment: occasional         Nettie Mcmahan

## 2021-11-11 ENCOUNTER — HOSPITAL ENCOUNTER (OUTPATIENT)
Dept: LAB | Age: 66
Discharge: HOME | End: 2021-11-11
Payer: MEDICARE

## 2021-11-11 DIAGNOSIS — E03.9 ACQUIRED HYPOTHYROIDISM: ICD-10-CM

## 2021-11-11 DIAGNOSIS — I10 ESSENTIAL HYPERTENSION, BENIGN: ICD-10-CM

## 2021-11-11 DIAGNOSIS — Z79.4 TYPE 2 DIABETES MELLITUS WITHOUT COMPLICATION, WITH LONG-TERM CURRENT USE OF INSULIN (HCC): ICD-10-CM

## 2021-11-11 DIAGNOSIS — E11.9 TYPE 2 DIABETES MELLITUS WITHOUT COMPLICATION, WITH LONG-TERM CURRENT USE OF INSULIN (HCC): ICD-10-CM

## 2021-11-11 DIAGNOSIS — E78.2 MIXED HYPERLIPIDEMIA: ICD-10-CM

## 2021-11-11 LAB
ALBUMIN SERPL-MCNC: 3.3 G/DL (ref 3.4–5)
ALBUMIN/GLOB SERPL: 0.9 {RATIO} (ref 0.8–1.7)
ALP SERPL-CCNC: 74 U/L (ref 45–117)
ALT SERPL-CCNC: 16 U/L (ref 13–56)
ANION GAP SERPL CALC-SCNC: 5 MMOL/L (ref 3–18)
AST SERPL-CCNC: 8 U/L (ref 10–38)
BASOPHILS # BLD: 0.1 K/UL (ref 0–0.1)
BASOPHILS NFR BLD: 1 % (ref 0–2)
BILIRUB SERPL-MCNC: 0.6 MG/DL (ref 0.2–1)
BUN SERPL-MCNC: 19 MG/DL (ref 7–18)
BUN/CREAT SERPL: 18 (ref 12–20)
CALCIUM SERPL-MCNC: 9.2 MG/DL (ref 8.5–10.1)
CHLORIDE SERPL-SCNC: 104 MMOL/L (ref 100–111)
CHOLEST SERPL-MCNC: 138 MG/DL
CO2 SERPL-SCNC: 28 MMOL/L (ref 21–32)
CREAT SERPL-MCNC: 1.06 MG/DL (ref 0.6–1.3)
CREAT UR-MCNC: 147 MG/DL (ref 30–125)
DIFFERENTIAL METHOD BLD: ABNORMAL
EOSINOPHIL # BLD: 0.4 K/UL (ref 0–0.4)
EOSINOPHIL NFR BLD: 3 % (ref 0–5)
ERYTHROCYTE [DISTWIDTH] IN BLOOD BY AUTOMATED COUNT: 13.2 % (ref 11.6–14.5)
EST. AVERAGE GLUCOSE BLD GHB EST-MCNC: 174 MG/DL
GLOBULIN SER CALC-MCNC: 3.6 G/DL (ref 2–4)
GLUCOSE SERPL-MCNC: 200 MG/DL (ref 74–99)
HBA1C MFR BLD: 7.7 % (ref 4.2–5.6)
HCT VFR BLD AUTO: 40.7 % (ref 35–45)
HDLC SERPL-MCNC: 40 MG/DL (ref 40–60)
HDLC SERPL: 3.5 {RATIO} (ref 0–5)
HGB BLD-MCNC: 13.5 G/DL (ref 12–16)
IMM GRANULOCYTES # BLD AUTO: 0.1 K/UL (ref 0–0.04)
IMM GRANULOCYTES NFR BLD AUTO: 1 % (ref 0–0.5)
LDLC SERPL CALC-MCNC: 70.8 MG/DL (ref 0–100)
LIPID PROFILE,FLP: NORMAL
LYMPHOCYTES # BLD: 2 K/UL (ref 0.9–3.6)
LYMPHOCYTES NFR BLD: 18 % (ref 21–52)
MCH RBC QN AUTO: 29.7 PG (ref 24–34)
MCHC RBC AUTO-ENTMCNC: 33.2 G/DL (ref 31–37)
MCV RBC AUTO: 89.6 FL (ref 78–100)
MICROALBUMIN UR-MCNC: 56.6 MG/DL (ref 0–3)
MICROALBUMIN/CREAT UR-RTO: 385 MG/G (ref 0–30)
MONOCYTES # BLD: 0.7 K/UL (ref 0.05–1.2)
MONOCYTES NFR BLD: 7 % (ref 3–10)
NEUTS SEG # BLD: 8 K/UL (ref 1.8–8)
NEUTS SEG NFR BLD: 71 % (ref 40–73)
NRBC # BLD: 0 K/UL (ref 0–0.01)
NRBC BLD-RTO: 0 PER 100 WBC
PLATELET # BLD AUTO: 368 K/UL (ref 135–420)
PMV BLD AUTO: 9.6 FL (ref 9.2–11.8)
POTASSIUM SERPL-SCNC: 4.6 MMOL/L (ref 3.5–5.5)
PROT SERPL-MCNC: 6.9 G/DL (ref 6.4–8.2)
RBC # BLD AUTO: 4.54 M/UL (ref 4.2–5.3)
SODIUM SERPL-SCNC: 137 MMOL/L (ref 136–145)
TRIGL SERPL-MCNC: 136 MG/DL (ref ?–150)
TSH SERPL DL<=0.05 MIU/L-ACNC: 7.58 UIU/ML (ref 0.36–3.74)
VLDLC SERPL CALC-MCNC: 27.2 MG/DL
WBC # BLD AUTO: 11.2 K/UL (ref 4.6–13.2)

## 2021-11-11 PROCEDURE — 85025 COMPLETE CBC W/AUTO DIFF WBC: CPT

## 2021-11-11 PROCEDURE — 36415 COLL VENOUS BLD VENIPUNCTURE: CPT

## 2021-11-11 PROCEDURE — 80061 LIPID PANEL: CPT

## 2021-11-11 PROCEDURE — 83036 HEMOGLOBIN GLYCOSYLATED A1C: CPT

## 2021-11-11 PROCEDURE — 84443 ASSAY THYROID STIM HORMONE: CPT

## 2021-11-11 PROCEDURE — 82043 UR ALBUMIN QUANTITATIVE: CPT

## 2021-11-11 PROCEDURE — 80053 COMPREHEN METABOLIC PANEL: CPT

## 2021-11-12 RX ORDER — HYDROCHLOROTHIAZIDE 25 MG/1
25 TABLET ORAL
Qty: 90 TABLET | Refills: 3 | Status: SHIPPED | OUTPATIENT
Start: 2021-11-12 | End: 2021-11-14 | Stop reason: ALTCHOICE

## 2021-11-12 RX ORDER — BENAZEPRIL HYDROCHLORIDE 40 MG/1
40 TABLET ORAL DAILY
Qty: 90 TABLET | Refills: 3 | Status: SHIPPED | OUTPATIENT
Start: 2021-11-12 | End: 2022-02-23 | Stop reason: SDUPTHER

## 2021-11-12 RX ORDER — ATENOLOL 100 MG/1
TABLET ORAL
Qty: 90 TABLET | Refills: 3 | Status: SHIPPED | OUTPATIENT
Start: 2021-11-12 | End: 2022-11-03

## 2021-11-12 RX ORDER — AMLODIPINE BESYLATE 10 MG/1
10 TABLET ORAL DAILY
Qty: 60 TABLET | Refills: 3 | Status: SHIPPED | OUTPATIENT
Start: 2021-11-12 | End: 2022-07-15

## 2021-11-12 NOTE — TELEPHONE ENCOUNTER
Requested Prescriptions     Pending Prescriptions Disp Refills    atenoloL (TENORMIN) 100 mg tablet 90 Tablet 1     Si qd    amLODIPine (NORVASC) 10 mg tablet 60 Tablet 0     Sig: Take 1 Tablet by mouth daily.  hydroCHLOROthiazide (HYDRODIURIL) 25 mg tablet 90 Tablet 0     Sig: Take 1 Tablet by mouth every morning.  SITagliptin (Januvia) 100 mg tablet 60 Tablet 0     Sig: Take 1 Tablet by mouth daily.  benazepriL (LOTENSIN) 40 mg tablet 90 Tablet 0     Sig: Take 1 Tablet by mouth daily.

## 2021-11-14 DIAGNOSIS — I10 ESSENTIAL HYPERTENSION, BENIGN: Primary | ICD-10-CM

## 2021-11-14 DIAGNOSIS — R80.9 MICROALBUMINURIA: ICD-10-CM

## 2021-11-14 RX ORDER — CHLORTHALIDONE 25 MG/1
25 TABLET ORAL DAILY
Qty: 90 TABLET | Refills: 3 | Status: SHIPPED | OUTPATIENT
Start: 2021-11-14 | End: 2022-11-03

## 2021-11-14 NOTE — PROGRESS NOTES
Please call the hemoglobin A1c is 7.7, about what it was 6 months ago at 7.6. Her goal of course is 7 or less. The urine sample shows that the patient is spilling an increased amount of protein in the urine which is a sign of kidney damage. We'll try to get the blood pressure down more by switching from hydrochlorothiazide to chlorthalidone.

## 2021-11-17 NOTE — TELEPHONE ENCOUNTER
Requested Prescriptions     Pending Prescriptions Disp Refills    pravastatin (PRAVACHOL) 40 mg tablet 90 Tablet 1     Sig: Take 1 Tablet by mouth nightly.  insulin glargine (Lantus U-100 Insulin) 100 unit/mL injection 40 mL 0     Si Units by SubCUTAneous route two (2) times a day.  Indications: type 2 diabetes mellitus

## 2021-11-18 RX ORDER — INSULIN GLARGINE 100 [IU]/ML
60 INJECTION, SOLUTION SUBCUTANEOUS 2 TIMES DAILY
Qty: 40 ML | Refills: 10 | Status: SHIPPED | OUTPATIENT
Start: 2021-11-18

## 2021-11-18 RX ORDER — PRAVASTATIN SODIUM 40 MG/1
40 TABLET ORAL
Qty: 90 TABLET | Refills: 3 | Status: SHIPPED | OUTPATIENT
Start: 2021-11-18

## 2021-12-03 NOTE — TELEPHONE ENCOUNTER
Requested Prescriptions     Pending Prescriptions Disp Refills    levothyroxine (SYNTHROID) 125 mcg tablet 60 Tablet 0     Sig: TAKE 1 TABLET BY MOUTH ONCE DAILY

## 2021-12-04 RX ORDER — LEVOTHYROXINE SODIUM 125 UG/1
TABLET ORAL
Qty: 90 TABLET | Refills: 3 | Status: SHIPPED | OUTPATIENT
Start: 2021-12-04 | End: 2022-02-23 | Stop reason: SDUPTHER

## 2022-01-05 ENCOUNTER — TELEPHONE (OUTPATIENT)
Dept: FAMILY MEDICINE CLINIC | Age: 67
End: 2022-01-05

## 2022-01-05 NOTE — TELEPHONE ENCOUNTER
Patient request refill for Glipizide ER 10 mg 2 tablets each morning. Previously prescribed by former doctor.

## 2022-01-06 RX ORDER — GLIPIZIDE 10 MG/1
20 TABLET, FILM COATED, EXTENDED RELEASE ORAL
Qty: 120 TABLET | Refills: 0 | Status: SHIPPED | OUTPATIENT
Start: 2022-01-06 | End: 2022-01-07 | Stop reason: SDUPTHER

## 2022-01-07 DIAGNOSIS — Z79.4 TYPE 2 DIABETES MELLITUS WITHOUT COMPLICATION, WITH LONG-TERM CURRENT USE OF INSULIN (HCC): Primary | ICD-10-CM

## 2022-01-07 DIAGNOSIS — E11.9 TYPE 2 DIABETES MELLITUS WITHOUT COMPLICATION, WITH LONG-TERM CURRENT USE OF INSULIN (HCC): Primary | ICD-10-CM

## 2022-01-07 RX ORDER — GLIPIZIDE 10 MG/1
20 TABLET, FILM COATED, EXTENDED RELEASE ORAL
Qty: 120 TABLET | Refills: 3 | Status: SHIPPED | OUTPATIENT
Start: 2022-01-07 | End: 2022-03-02

## 2022-02-22 NOTE — PROGRESS NOTES
ICD-10-CM ICD-9-CM    1. Essential hypertension, benign  I10 401.1 benazepriL (LOTENSIN) 40 mg tablet      CBC WITH AUTOMATED DIFF   2. Type 2 diabetes mellitus without complication, with long-term current use of insulin ([facility])  S54.5 703.71 METABOLIC PANEL, COMPREHENSIVE    Z79.4 V58.67 HEMOGLOBIN A1C WITH EAG      MICROALBUMIN, UR, RAND W/ MICROALB/CREAT RATIO      dapagliflozin (Farxiga) 5 mg tab tablet   3. Mixed hyperlipidemia  E78.2 272.2 LIPID PANEL   4. Acquired hypothyroidism  E03.9 244.9 levothyroxine (SYNTHROID) 150 mcg tablet      TSH 3RD GENERATION   5. Obesity, Class III, BMI 40-49.9 (morbid obesity) ([address]  E66.01 278.01      Follow-up and Dispositions    · Return in about 3 months (around 5/23/2022) for Follow up on illness, Draw labs/diagnostics 1 week prior to next visit. Assessment and plan  Essential hypertension, chronic problem. Still elevated on 4 medications. We will can a recheck the blood pressure next visit. We will review what we can add. Heart rate is presently about 68 so it limits what we can do. Diabetes mellitus type 2. Chronic problem, not at control. Hyperlipidemia. Chronic problem well-controlled. Acquired hypothyroidism, chronic problem. TSH elevated, not in control. Synthroid increased to 150 mcg daily. Could be contributing to the hypertension and of course thinning hair,. Recheck next visit. Unhealthy weight discussed diet and exercise. Diabetes mellitus not in control. Hemoglobin A1c is 7.7. On glipizide. Also on Januvia. We will add [place]. Lab results and schedule of future lab studies reviewed with patient  Diagnostic and radiologic results and the schedule of future studies were reviewed with the patient  All questions answered and understood.       Subjective:   Lauro Armstrong is a 75 y.o. female has Gross hematuria, Recurrent UTI, Morbid obesity ([address] Incisional hernia, Esophagitis, unspecified, Generalized osteoarthrosis, involving multiple sites, Unspecified venous (peripheral) insufficiency, Hypothyroidism, Essential hypertension, benign, DM type 2 (diabetes mellitus, type 2) ([facility] Fibrocystic change of breast, and Mixed hyperlipidemia on their problem list.. No chief complaint on file. The last visit was 11/2/2021. Labs done 11/11/2021. CBC unremarkable. The CMP showed a glucose of 200 GFR approximately 52, albumin low at 3.3 and otherwise unremarkable. Hemoglobin A1c 7.7 with an average glucose of 174. Microalbumin to creatinine ratio 385, elevated. Total cholesterol 138 LDL cholesterol 70.8. TSH 7.58. Elevated. Essential hypertension. Elevated reading today. We will not change as this is the first visit to us. Adjust meds if elevated on next visit. Type 2 diabetes mellitus, insulin requiring. Not controlled in May 2021 and not at target of less than 7. Tired Follow-up labs ordered. If elevated with new labs would adjust medication. Mixed hyperlipidemia. Chronic problem. Not controlled in May 2021. Follow-up labs ordered. If still elevated adjust meds. Acquired hypothyroidism. TSH elevated on last visit in May 2021. Repeat level ordered. If TSH elevated would increase the Synthroid. reviewed diet, exercise and weight control  Upper respiratory infection  Believes she had COVID approximately 2 months prior. There is a history of exposure and the patient had upper respiratory symptoms. She had previously been vaccinated and had the booster shot she stated it felt like an upper respiratory infection that was mild. Hypertension  The patient has no headaches, visual changes, chest pain or pressure,dyspnea, orthopnea, or PND. There is no problem with the medication. Rechecking on elevated blood pressure at this time. She has been taking the medication regularly. She had previously been on metoprolol but this caused a significant bradycardia. No history of secondary causes for hypertension.   Diabetes mellitus  The patient denies polyuria, polydipsia, or polyphagia. The patient denies any other aggravating or relieving factors There has been no problem with the medications. She has not adhered to any specific diet. Hyperlipidemia   The patient denies any myalgias or weakness. No abdominal discomfort admitted to. The patient denies any difficulty with or side effects from the medication. BP Readings from Last 3 Encounters:   02/23/22 (!) 160/65   11/02/21 (!) 165/62   08/06/20 138/80     Please press want to confirm that you are ready to be connected with guilherme this is Dr. Padmini Borden Ms. speak to Ms. Vaughn please all right very good okay to get your date of birth just for confirmation all right how can I help you today I can get the day will questioning what medications are you taking back left back so heavy had any fever or chills 0.2 and with C any discomfort anywhere in the nose or face he has about the left side of back of have you had this before I have I was then treated is a very good he have any other medical problems    Lab Results   Component Value Date/Time    Hemoglobin A1c 7.7 (H) 11/11/2021 08:30 AM     Lab Results   Component Value Date/Time    Cholesterol, total 138 11/11/2021 08:30 AM    HDL Cholesterol 40 11/11/2021 08:30 AM    LDL, calculated 70.8 11/11/2021 08:30 AM    VLDL, calculated 27.2 11/11/2021 08:30 AM    Triglyceride 136 11/11/2021 08:30 AM    CHOL/HDL Ratio 3.5 11/11/2021 08:30 AM       Hypothyroidism  The patient denies fatigue, cold intolerance, weight gain, constipation, dry skin, myalgia, constipation, joint aches or menstrual irregularities. There is no history of cognitive , dry skin hoarseness or edema. The patient has not noticed a puffy face, tongue enlargement, or loss of  hair or eyebrows. The patient has no problem with her medication.   Lab Results   Component Value Date/Time    TSH 7.58 (H) 11/11/2021 08:30 AM       Lab Results   Component Value Date/Time Sodium 137 11/11/2021 08:30 AM    Potassium 4.6 11/11/2021 08:30 AM    Chloride 104 11/11/2021 08:30 AM    CO2 28 11/11/2021 08:30 AM    Anion gap 5 11/11/2021 08:30 AM    Glucose 200 (H) 11/11/2021 08:30 AM    BUN 19 (H) 11/11/2021 08:30 AM    Creatinine 1.06 11/11/2021 08:30 AM    BUN/Creatinine ratio 18 11/11/2021 08:30 AM    GFR est AA >60 11/11/2021 08:30 AM    GFR est non-AA 52 (L) 11/11/2021 08:30 AM    Calcium 9.2 11/11/2021 08:30 AM     Lab Results   Component Value Date/Time    WBC 11.2 11/11/2021 08:30 AM    HGB 13.5 11/11/2021 08:30 AM    HCT 40.7 11/11/2021 08:30 AM    PLATELET 014 59/88/5862 08:30 AM    MCV 89.6 11/11/2021 08:30 AM     Smoker   75 y.o.. reports that she quit smoking about 9 years ago. Her smoking use included cigarettes. She has never used smokeless tobacco.  All told smoked 25 years or less, Intermittent smoker. 1 ppd aware of the side effects of smoking. Review of Systems   Constitutional: Negative for chills and fever. Cold intolerance   HENT: Negative for hearing loss. Eyes: Negative for blurred vision, discharge and redness. Cataracts both eyes   Respiratory: Negative for cough and shortness of breath. Cardiovascular: Positive for leg swelling. Negative for chest pain, palpitations and orthopnea. Cardiac murmur   Gastrointestinal: Negative for abdominal pain, blood in stool, constipation, diarrhea and melena. Right-sided ventral hernia   Genitourinary: Negative for dysuria, frequency and urgency. Musculoskeletal: Negative for joint pain and myalgias. Skin: Negative for rash (dishydrotic eczema). Neurological: Negative for dizziness, focal weakness and headaches. Psychiatric/Behavioral: Negative for depression. The patient is not nervous/anxious.       Health Maintenance Due   Topic Date Due    Colorectal Cancer Screening Combo  Never done    Shingrix Vaccine Age 50> (1 of 2) Never done    Bone Densitometry (Dexa) Screening  Never done    Eye Exam Retinal or Dilated  07/03/2019    Breast Cancer Screen Mammogram  06/12/2020   Dexa scan ordered  Eyes checked by Dr. Cristin Lara. We will obtain the report. Mammogram agreed to  [facility], prior positive screening        Current Outpatient Medications   Medication Sig    glipiZIDE SR (GLUCOTROL XL) 10 mg CR tablet Take 2 Tablets by mouth every morning.  levothyroxine (SYNTHROID) 125 mcg tablet TAKE 1 TABLET BY MOUTH ONCE DAILY    pravastatin (PRAVACHOL) 40 mg tablet Take 1 Tablet by mouth nightly.  insulin glargine (Lantus U-100 Insulin) 100 unit/mL injection 60 Units by SubCUTAneous route two (2) times a day. Indications: type 2 diabetes mellitus    chlorthalidone (HYGROTON) 25 mg tablet Take 1 Tablet by mouth daily for 360 days.  atenoloL (TENORMIN) 100 mg tablet 1 qd    amLODIPine (NORVASC) 10 mg tablet Take 1 Tablet by mouth daily.  SITagliptin (Januvia) 100 mg tablet Take 1 Tablet by mouth daily.  benazepriL (LOTENSIN) 40 mg tablet Take 1 Tablet by mouth daily.  varicella-zoster recombinant, PF, (Shingrix, PF,) 50 mcg/0.5 mL susr injection 0.5mL by IntraMUSCular route once now and then repeat in 2-6 months    Blood-Glucose Meter monitoring kit Test daily as directed  Dx E11.9    glucose blood VI test strips (blood glucose test) strip Test daily as directed  Dx E11.9    naproxen sodium (ANAPROX) 550 mg tablet TAKE 1 TABLET BY MOUTH TWICE DAILY AS NEEDED FOR PAIN     No current facility-administered medications for this visit.      Allergies   Allergen Reactions    Metformin Nausea Only     has Gross hematuria, Recurrent UTI, Morbid obesity ([address] Incisional hernia, Esophagitis, unspecified, Generalized osteoarthrosis, involving multiple sites, Unspecified venous (peripheral) insufficiency, Hypothyroidism, Essential hypertension, benign, DM type 2 (diabetes mellitus, type 2) ([address] Fibrocystic change of breast, and Mixed hyperlipidemia on their problem list.    Past Surgical History:   Procedure Laterality Date    HX COLONOSCOPY  4/2005    (vidhya) neg    HX HERNIA REPAIR Right     abdominal hernia, right side    HX HYSTERECTOMY  1994    HX OTHER SURGICAL      pilonidal cyst excision x 2    LA BIOPSY OF BREAST, INCISIONAL  Jan 2012    right breast      reports that she quit smoking about 9 years ago. Her smoking use included cigarettes. She has never used smokeless tobacco. She reports current alcohol use. She reports that she does not use drugs. family history includes Cancer in her daughter and father; Heart Attack in her mother; Heart Disease in an other family member; Lung Disease in her mother; Prostate Cancer (age of onset: 80) in her father. Visit Vitals  BP (!) 160/65 (BP 1 Location: Left arm, BP Patient Position: Sitting, BP Cuff Size: Large adult)   Pulse 83   Temp 97.4 °F (36.3 °C) (Temporal)   Resp 16   Ht 5' 3\" (1.6 m)   Wt 242 lb (109.8 kg)   SpO2 96%   BMI 42.87 kg/m²       Physical Exam  Vitals and nursing note reviewed. Constitutional:       Appearance: She is well-developed. HENT:      Head: Normocephalic and atraumatic. Right Ear: External ear normal.      Left Ear: Tympanic membrane and external ear normal.      Nose: Nose normal.      Mouth/Throat:      Mouth: Mucous membranes are dry. Eyes:      Conjunctiva/sclera: Conjunctivae normal.      Pupils: Pupils are equal, round, and reactive to light. Cardiovascular:      Rate and Rhythm: Normal rate and regular rhythm. Heart sounds: Murmur (2/6 systolic murmur second right intercostal space) heard. Pulmonary:      Effort: Pulmonary effort is normal. No respiratory distress. Breath sounds: Normal breath sounds. No wheezing or rales. Chest:      Chest wall: No tenderness. Abdominal:      General: Bowel sounds are normal. There is distension (Right-sided distention. Correlate with her aforementioned hernia. She also has a midline scar below the umbilicus. Well-healed). Palpations: Abdomen is soft. There is no mass. Tenderness: There is no abdominal tenderness. There is no guarding or rebound. Musculoskeletal:         General: Normal range of motion. Cervical back: Normal range of motion and neck supple. Skin:     General: Skin is warm and dry. Findings: No erythema or rash. Neurological:      Mental Status: She is alert and oriented to person, place, and time. Psychiatric:         Mood and Affect: Mood normal.         Behavior: Behavior normal.         Judgment: Judgment normal.        Diabetic foot exam:     Left Foot:   Visual Exam: normal    Pulse DP: 2+ (normal)   Filament test: normal sensation    Vibratory sensation: normal      Right Foot:   Visual Exam: normal    Pulse DP: 2+ (normal)   Filament test: normal sensation    Vibratory sensation: normal        Results for orders placed or performed during the hospital encounter of 11/11/21   CBC WITH AUTOMATED DIFF   Result Value Ref Range    WBC 11.2 4.6 - 13.2 K/uL    RBC 4.54 4.20 - 5.30 M/uL    HGB 13.5 12.0 - 16.0 g/dL    HCT 40.7 35.0 - 45.0 %    MCV 89.6 78.0 - 100.0 FL    MCH 29.7 24.0 - 34.0 PG    MCHC 33.2 31.0 - 37.0 g/dL    RDW 13.2 11.6 - 14.5 %    PLATELET 590 847 - 149 K/uL    MPV 9.6 9.2 - 11.8 FL    NRBC 0.0 0  WBC    ABSOLUTE NRBC 0.00 0.00 - 0.01 K/uL    NEUTROPHILS 71 40 - 73 %    LYMPHOCYTES 18 (L) 21 - 52 %    MONOCYTES 7 3 - 10 %    EOSINOPHILS 3 0 - 5 %    BASOPHILS 1 0 - 2 %    IMMATURE GRANULOCYTES 1 (H) 0.0 - 0.5 %    ABS. NEUTROPHILS 8.0 1.8 - 8.0 K/UL    ABS. LYMPHOCYTES 2.0 0.9 - 3.6 K/UL    ABS. MONOCYTES 0.7 0.05 - 1.2 K/UL    ABS. EOSINOPHILS 0.4 0.0 - 0.4 K/UL    ABS. BASOPHILS 0.1 0.0 - 0.1 K/UL    ABS. IMM.  GRANS. 0.1 (H) 0.00 - 0.04 K/UL    DF AUTOMATED     METABOLIC PANEL, COMPREHENSIVE   Result Value Ref Range    Sodium 137 136 - 145 mmol/L    Potassium 4.6 3.5 - 5.5 mmol/L    Chloride 104 100 - 111 mmol/L    CO2 28 21 - 32 mmol/L    Anion gap 5 3.0 - 18 mmol/L    Glucose 200 (H) 74 - 99 mg/dL    BUN 19 (H) 7.0 - 18 MG/DL    Creatinine 1.06 0.6 - 1.3 MG/DL    BUN/Creatinine ratio 18 12 - 20      GFR est AA >60 >60 ml/min/1.73m2    GFR est non-AA 52 (L) >60 ml/min/1.73m2    Calcium 9.2 8.5 - 10.1 MG/DL    Bilirubin, total 0.6 0.2 - 1.0 MG/DL    ALT (SGPT) 16 13 - 56 U/L    AST (SGOT) 8 (L) 10 - 38 U/L    Alk. phosphatase 74 45 - 117 U/L    Protein, total 6.9 6.4 - 8.2 g/dL    Albumin 3.3 (L) 3.4 - 5.0 g/dL    Globulin 3.6 2.0 - 4.0 g/dL    A-G Ratio 0.9 0.8 - 1.7     LIPID PANEL   Result Value Ref Range    LIPID PROFILE          Cholesterol, total 138 <200 MG/DL    Triglyceride 136 <150 MG/DL    HDL Cholesterol 40 40 - 60 MG/DL    LDL, calculated 70.8 0 - 100 MG/DL    VLDL, calculated 27.2 MG/DL    CHOL/HDL Ratio 3.5 0 - 5.0     HEMOGLOBIN A1C WITH EAG   Result Value Ref Range    Hemoglobin A1c 7.7 (H) 4.2 - 5.6 %    Est. average glucose 174 mg/dL   MICROALBUMIN, UR, RAND W/ MICROALB/CREAT RATIO   Result Value Ref Range    Microalbumin,urine random 56.60 (H) 0 - 3.0 MG/DL    Creatinine, urine 147.00 (H) 30 - 125 mg/dL    Microalbumin/Creat ratio (mg/g creat) 385 (H) 0 - 30 mg/g   TSH 3RD GENERATION   Result Value Ref Range    TSH 7.58 (H) 0.36 - 3.74 uIU/mL       We discussed the expected course, resolution and complications of the diagnosis(es) in detail. Medication risks, benefits, costs, interactions, and alternatives were discussed as indicated. I advised her to contact the office if her condition worsens, changes or fails to improve as anticipated. She expressed understanding with the diagnosis(es) and plan. This note was done with the assistance of dragon speech software.   Some inadvertent errors or omissions may be present

## 2022-02-23 ENCOUNTER — OFFICE VISIT (OUTPATIENT)
Dept: FAMILY MEDICINE CLINIC | Age: 67
End: 2022-02-23
Payer: MEDICARE

## 2022-02-23 VITALS
DIASTOLIC BLOOD PRESSURE: 65 MMHG | BODY MASS INDEX: 42.88 KG/M2 | TEMPERATURE: 97.4 F | RESPIRATION RATE: 16 BRPM | OXYGEN SATURATION: 96 % | HEIGHT: 63 IN | HEART RATE: 83 BPM | WEIGHT: 242 LBS | SYSTOLIC BLOOD PRESSURE: 160 MMHG

## 2022-02-23 DIAGNOSIS — E03.9 ACQUIRED HYPOTHYROIDISM: ICD-10-CM

## 2022-02-23 DIAGNOSIS — E78.2 MIXED HYPERLIPIDEMIA: ICD-10-CM

## 2022-02-23 DIAGNOSIS — E11.9 TYPE 2 DIABETES MELLITUS WITHOUT COMPLICATION, WITH LONG-TERM CURRENT USE OF INSULIN (HCC): ICD-10-CM

## 2022-02-23 DIAGNOSIS — Z79.4 TYPE 2 DIABETES MELLITUS WITHOUT COMPLICATION, WITH LONG-TERM CURRENT USE OF INSULIN (HCC): ICD-10-CM

## 2022-02-23 DIAGNOSIS — I10 ESSENTIAL HYPERTENSION, BENIGN: Primary | ICD-10-CM

## 2022-02-23 DIAGNOSIS — E66.01 OBESITY, CLASS III, BMI 40-49.9 (MORBID OBESITY) (HCC): ICD-10-CM

## 2022-02-23 PROCEDURE — G8754 DIAS BP LESS 90: HCPCS | Performed by: EMERGENCY MEDICINE

## 2022-02-23 PROCEDURE — G8417 CALC BMI ABV UP PARAM F/U: HCPCS | Performed by: EMERGENCY MEDICINE

## 2022-02-23 PROCEDURE — 2022F DILAT RTA XM EVC RTNOPTHY: CPT | Performed by: EMERGENCY MEDICINE

## 2022-02-23 PROCEDURE — G8510 SCR DEP NEG, NO PLAN REQD: HCPCS | Performed by: EMERGENCY MEDICINE

## 2022-02-23 PROCEDURE — 1090F PRES/ABSN URINE INCON ASSESS: CPT | Performed by: EMERGENCY MEDICINE

## 2022-02-23 PROCEDURE — 3017F COLORECTAL CA SCREEN DOC REV: CPT | Performed by: EMERGENCY MEDICINE

## 2022-02-23 PROCEDURE — G9899 SCRN MAM PERF RSLTS DOC: HCPCS | Performed by: EMERGENCY MEDICINE

## 2022-02-23 PROCEDURE — G8400 PT W/DXA NO RESULTS DOC: HCPCS | Performed by: EMERGENCY MEDICINE

## 2022-02-23 PROCEDURE — G8536 NO DOC ELDER MAL SCRN: HCPCS | Performed by: EMERGENCY MEDICINE

## 2022-02-23 PROCEDURE — 99214 OFFICE O/P EST MOD 30 MIN: CPT | Performed by: EMERGENCY MEDICINE

## 2022-02-23 PROCEDURE — G8427 DOCREV CUR MEDS BY ELIG CLIN: HCPCS | Performed by: EMERGENCY MEDICINE

## 2022-02-23 PROCEDURE — 1101F PT FALLS ASSESS-DOCD LE1/YR: CPT | Performed by: EMERGENCY MEDICINE

## 2022-02-23 PROCEDURE — G8753 SYS BP > OR = 140: HCPCS | Performed by: EMERGENCY MEDICINE

## 2022-02-23 PROCEDURE — 3046F HEMOGLOBIN A1C LEVEL >9.0%: CPT | Performed by: EMERGENCY MEDICINE

## 2022-02-23 RX ORDER — LEVOTHYROXINE SODIUM 150 UG/1
TABLET ORAL
Qty: 90 TABLET | Refills: 3 | Status: SHIPPED | OUTPATIENT
Start: 2022-02-23 | End: 2022-10-16 | Stop reason: SDUPTHER

## 2022-02-23 RX ORDER — BENAZEPRIL HYDROCHLORIDE 40 MG/1
40 TABLET ORAL DAILY
Qty: 90 TABLET | Refills: 3 | Status: SHIPPED | OUTPATIENT
Start: 2022-02-23

## 2022-02-23 NOTE — PROGRESS NOTES
Mellisa Bolton is a 75 y.o. female that is here for a   Chief Complaint   Patient presents with    Follow Up Chronic Condition     HTN DM Cholesterol          1. Have you been to the ER, urgent care clinic since your last visit? Hospitalized since your last visit?no    2. Have you seen or consulted any other health care providers outside of the [address] [place] [address] since your last visit? Include any pap smears or colon screening.  no      Health Maintenance reviewed - yes      Upcoming Appts  no      VORB: No orders of the defined types were placed in this encounter.   Margaret Reese MD/ Jose Miguel Mendez MA

## 2022-02-23 NOTE — PATIENT INSTRUCTIONS
Hyperlipidemia: After Your Visit  Your Care Instructions  Hyperlipidemia is too much fat in your blood. The body has several kinds of fat, including cholesterol and triglycerides. Your body needs fat for many things, such as making new cells. But too much fat in your blood increases your chances of having a heart attack or stroke. You may be able to lower your cholesterol and triglycerides with a heart-healthy diet, exercise, and if needed, medicine. Your doctor may want you to try lifestyle changes first to see whether they lower the fat in your blood. You may need to take medicine if lifestyle changes do not lower the fat in your blood enough. Follow-up care is a key part of your treatment and safety. Be sure to make and go to all appointments, and call your doctor if you are having problems. Its also a good idea to know your test results and keep a list of the medicines you take. How can you care for yourself at home? Take your medicines  · Take your medicines exactly as prescribed. Call your doctor if you think you are having a problem with your medicine. · If you take medicine to lower your cholesterol, go to follow-up visits. You will need to have blood tests. · Do not take large doses of niacin, which is a B vitamin, while taking medicine called statins. It may increase the chance of muscle pain and liver problems. · Talk to your doctor about avoiding grapefruit juice if you are taking statins. Grapefruit juice can raise the level of this medicine in your blood. This could increase side effects. Eat more fruits, vegetables, and fiber  · Fruits and vegetables have lots of nutrients that help protect against heart disease, and they have little--if any--fat. Try to eat at least five servings a day. Dark green, deep orange, or yellow fruits and vegetables are healthy choices. · Keep carrots, celery, and other veggies handy for snacks.  Buy fruit that is in season and store it where you can see it so that you will be tempted to eat it. Cook dishes that have a lot of veggies in them, such as stir-fries and soups. · Foods high in fiber may reduce your cholesterol and provide important vitamins and minerals. High-fiber foods include whole-grain cereals and breads, oatmeal, beans, brown rice, citrus fruits, and apples. · Buy whole-grain breads and cereals instead of white bread and pastries. Limit saturated fat  · Read food labels and try to avoid saturated fat and trans fat. They increase your risk of heart disease. · Use olive or canola oil when you cook. Try cholesterol-lowering spreads, such as Benecol or Take Control. · Bake, broil, grill, or steam foods instead of frying them. · Limit the amount of high-fat meats you eat, including hot dogs and sausages. Cut out all visible fat when you prepare meat. · Eat fish, skinless poultry, and soy products such as tofu instead of high-fat meats. Soybeans may be especially good for your heart. Eat at least two servings of fish a week. Certain fish, such as salmon, contain omega-3 fatty acids, which may help reduce your risk of heart attack. · Choose low-fat or fat-free milk and dairy products. Get exercise, limit alcohol, and quit smoking  · Get more exercise. Work with your doctor to set up an exercise program. Even if you can do only a small amount, exercise will help you get stronger, have more energy, and manage your weight and your stress. Walking is an easy way to get exercise. Gradually increase the amount you walk every day. Aim for at least 30 minutes on most days of the week. You also may want to swim, bike, or do other activities. · Limit alcohol to no more than 2 drinks a day for men and 1 drink a day for women. · Do not smoke. If you need help quitting, talk to your doctor about stop-smoking programs and medicines. These can increase your chances of quitting for good. When should you call for help?   Call 911 anytime you think you may need emergency care. For example, call if:  · You have symptoms of a heart attack. These may include:  ¨ Chest pain or pressure, or a strange feeling in the chest.  ¨ Sweating. ¨ Shortness of breath. ¨ Nausea or vomiting. ¨ Pain, pressure, or a strange feeling in the back, neck, jaw, or upper belly or in one or both shoulders or arms. ¨ Lightheadedness or sudden weakness. ¨ A fast or irregular heartbeat. After you call 911, the  may tell you to chew 1 adult-strength or 2 to 4 low-dose aspirin. Wait for an ambulance. Do not try to drive yourself. · You have signs of a stroke. These may include:  ¨ Sudden numbness, paralysis, or weakness in your face, arm, or leg, especially on only one side of your body. ¨ New problems with walking or balance. ¨ Sudden vision changes. ¨ Drooling or slurred speech. ¨ New problems speaking or understanding simple statements, or feeling confused. ¨ A sudden, severe headache that is different from past headaches. · You passed out (lost consciousness). Call your doctor now or seek immediate medical care if:  · You have muscle pain or weakness. Watch closely for changes in your health, and be sure to contact your doctor if:  · You are very tired. · You have an upset stomach, gas, constipation, or belly pain or cramps. Where can you learn more? Go to [employer]  Enter C406 in the search box to learn more about \"Hyperlipidemia: After Your Visit. \"   © 6769-9885 Healthwise, Incorporated. Care instructions adapted under license by [employer] (which disclaims liability or warranty for this information). This care instruction is for use with your licensed healthcare professional. If you have questions about a medical condition or this instruction, always ask your healthcare professional. [place] [address] any warranty or liability for your use of this information.   Content Version: 7.8.984592; Last Revised: October 13, 2011                 Type 2 Diabetes: Care Instructions  Your Care Instructions     Type 2 diabetes is a disease that develops when the body's tissues cannot use insulin properly. Over time, the pancreas cannot make enough insulin. Insulin is a hormone that helps the body's cells use sugar (glucose) for energy. It also helps the body store extra sugar in muscle, fat, and liver cells. Without insulin, the sugar cannot get into the cells to do its work. It stays in the blood instead. This can cause high blood sugar levels. A person has diabetes when the blood sugar stays too high too much of the time. Over time, diabetes can lead to diseases of the heart, blood vessels, nerves, kidneys, and eyes. You may be able to control your blood sugar by losing weight, eating a healthy diet, and getting daily exercise. You may also have to take insulin or other diabetes medicine. Follow-up care is a key part of your treatment and safety. Be sure to make and go to all appointments. Call your doctor if you are having problems. It's also a good idea to know your test results and keep a list of the medicines you take. How can you care for yourself at home? · Keep your blood sugar at a target level (which you set with your doctor). ? Carbohydrate--the body's main source of fuel--affects blood sugar more than any other nutrient. Carbohydrate is in fruits, vegetables, milk, and yogurt. It also is in breads, cereals, vegetables such as potatoes and corn, and sugary foods such as candy and cakes. Follow your meal plan to know how much carbohydrate to eat at each meal and snack. ? Aim for 30 minutes of exercise on most, preferably all, days of the week. Walking is a good choice. You also may want to do other activities, such as running, swimming, cycling, or playing tennis or team sports. Try to do muscle-strengthening exercises at least 2 times a week. ? Take your medicines exactly as prescribed.  Call your doctor if you think you are having a problem with your medicine. You will get more details on the specific medicines your doctor prescribes. · Check your blood sugar as often as your doctor recommends. It is important to keep track of any symptoms you have, such as low blood sugar. Also tell your doctor if you have any changes in your activities, diet, or insulin use. · Talk to your doctor before you start taking aspirin every day. Aspirin can help certain people lower their risk of a heart attack or stroke. But taking aspirin isn't right for everyone, because it can cause serious bleeding. · Do not smoke. If you need help quitting, talk to your doctor about stop-smoking programs and medicines. These can increase your chances of quitting for good. · Keep your cholesterol and blood pressure at normal levels. You may need to take one or more medicines to reach your goals. Take them exactly as directed. Do not stop or change a medicine without talking to your doctor first.  When should you call for help? Call 911 anytime you think you may need emergency care. For example, call if:    · You passed out (lost consciousness), or you suddenly become very sleepy or confused. (You may have very low blood sugar.)   Call your doctor now or seek immediate medical care if:    · Your blood sugar is 300 mg/dL or is higher than the level your doctor has set for you. · You have symptoms of low blood sugar, such as:  ? Sweating. ? Feeling nervous, shaky, and weak. ? Extreme hunger and slight nausea. ? Dizziness and headache.  ? Blurred vision. ? Confusion. Watch closely for changes in your health, and be sure to contact your doctor if:    · You often have problems controlling your blood sugar. · You have symptoms of long-term diabetes problems, such as:  ? New vision changes. ? New pain, numbness, or tingling in your hands or feet. ? Skin problems. Where can you learn more?   Go to http://www.gray.com/  Enter C553 in the search box to learn more about \"Type 2 Diabetes: Care Instructions. \"  Current as of: August 31, 2020               Content Version: 12.8  © 2006-2021 [employer]. Care instructions adapted under license by [employer] (which disclaims liability or warranty for this information). If you have questions about a medical condition or this instruction, always ask your healthcare professional. [address] any warranty or liability for your use of this information. High Blood Pressure: Care Instructions  Overview     It's normal for blood pressure to go up and down throughout the day. But if it stays up, you have high blood pressure. Another name for high blood pressure is hypertension. Despite what a lot of people think, high blood pressure usually doesn't cause headaches or make you feel dizzy or lightheaded. It usually has no symptoms. But it does increase your risk of stroke, heart attack, and other problems. You and your doctor will talk about your risks of these problems based on your blood pressure. Your doctor will give you a goal for your blood pressure. Your goal will be based on your health and your age. Lifestyle changes, such as eating healthy and being active, are always important to help lower blood pressure. You might also take medicine to reach your blood pressure goal.  Follow-up care is a key part of your treatment and safety. Be sure to make and go to all appointments, and call your doctor if you are having problems. It's also a good idea to know your test results and keep a list of the medicines you take. How can you care for yourself at home? Medical treatment  · If you stop taking your medicine, your blood pressure will go back up. You may take one or more types of medicine to lower your blood pressure. Be safe with medicines. Take your medicine exactly as prescribed.  Call your doctor if you think you are having a problem with your medicine. · Talk to your doctor before you start taking aspirin every day. Aspirin can help certain people lower their risk of a heart attack or stroke. But taking aspirin isn't right for everyone, because it can cause serious bleeding. · See your doctor regularly. You may need to see the doctor more often at first or until your blood pressure comes down. · If you are taking blood pressure medicine, talk to your doctor before you take decongestants or anti-inflammatory medicine, such as ibuprofen. Some of these medicines can raise blood pressure. · Learn how to check your blood pressure at home. Lifestyle changes  · Stay at a healthy weight. This is especially important if you put on weight around the waist. Losing even 10 pounds can help you lower your blood pressure. · If your doctor recommends it, get more exercise. Walking is a good choice. Bit by bit, increase the amount you walk every day. Try for at least 30 minutes on most days of the week. You also may want to swim, bike, or do other activities. · Avoid or limit alcohol. Talk to your doctor about whether you can drink any alcohol. · Try to limit how much sodium you eat to less than 2,300 milligrams (mg) a day. Your doctor may ask you to try to eat less than 1,500 mg a day. · Eat plenty of fruits (such as bananas and oranges), vegetables, legumes, whole grains, and low-fat dairy products. · Lower the amount of saturated fat in your diet. Saturated fat is found in animal products such as milk, cheese, and meat. Limiting these foods may help you lose weight and also lower your risk for heart disease. · Do not smoke. Smoking increases your risk for heart attack and stroke. If you need help quitting, talk to your doctor about stop-smoking programs and medicines. These can increase your chances of quitting for good. When should you call for help? Call  911 anytime you think you may need emergency care.  This may mean having symptoms that suggest that your blood pressure is causing a serious heart or blood vessel problem. Your blood pressure may be over 180/120. For example, call 911 if:    · You have symptoms of a heart attack. These may include:  ? Chest pain or pressure, or a strange feeling in the chest.  ? Sweating. ? Shortness of breath. ? Nausea or vomiting. ? Pain, pressure, or a strange feeling in the back, neck, jaw, or upper belly or in one or both shoulders or arms. ? Lightheadedness or sudden weakness. ? A fast or irregular heartbeat. · You have symptoms of a stroke. These may include:  ? Sudden numbness, tingling, weakness, or loss of movement in your face, arm, or leg, especially on only one side of your body. ? Sudden vision changes. ? Sudden trouble speaking. ? Sudden confusion or trouble understanding simple statements. ? Sudden problems with walking or balance. ? A sudden, severe headache that is different from past headaches. · You have severe back or belly pain. Do not wait until your blood pressure comes down on its own. Get help right away. Call your doctor now or seek immediate care if:    · Your blood pressure is much higher than normal (such as 180/120 or higher), but you don't have symptoms. · You think high blood pressure is causing symptoms, such as:  ? Severe headache.  ? Blurry vision. Watch closely for changes in your health, and be sure to contact your doctor if:    · Your blood pressure measures higher than your doctor recommends at least 2 times. That means the top number is higher or the bottom number is higher, or both. · You think you may be having side effects from your blood pressure medicine. Where can you learn more? Go to http://www.gray.com/  Enter [id number] in the search box to learn more about \"High Blood Pressure: Care Instructions. \"  Current as of: August 31, 2020               Content Version: 12.8  © 2043-4291 Healthwise, [facility].    Care instructions adapted under license by [employer] (which disclaims liability or warranty for this information). If you have questions about a medical condition or this instruction, always ask your healthcare professional. [address] any warranty or liability for your use of this information. Learning About Carbohydrate (Carb) Counting and Eating Out When You Have Diabetes  Why plan your meals? Meal planning can be a key part of managing diabetes. Planning meals and snacks with the right balance of carbohydrate, protein, and fat can help you keep your blood sugar at the target level you set with your doctor. You don't have to eat special foods. You can eat what your family eats, including sweets once in a while. But you do have to pay attention to how often you eat and how much you eat of certain foods. You may want to work with a dietitian or a certified diabetes educator. He or she can give you tips and meal ideas and can answer your questions about meal planning. This health professional can also help you reach a healthy weight if that is one of your goals. What should you know about eating carbs? Managing the amount of carbohydrate (carbs) you eat is an important part of healthy meals when you have diabetes. Carbohydrate is found in many foods. · Learn which foods have carbs. And learn the amounts of carbs in different foods. ? Bread, cereal, pasta, and rice have about 15 grams of carbs in a serving. A serving is 1 slice of bread (1 ounce), ½ cup of cooked cereal, or 1/3 cup of cooked pasta or rice. ? Fruits have 15 grams of carbs in a serving. A serving is 1 small fresh fruit, such as an apple or orange; ½ of a banana; ½ cup of cooked or canned fruit; ½ cup of fruit juice; 1 cup of melon or raspberries; or 2 tablespoons of dried fruit. ? Milk and no-sugar-added yogurt have 15 grams of carbs in a serving.  A serving is 1 cup of milk or 2/3 cup of no-sugar-added yogurt. ? Starchy vegetables have 15 grams of carbs in a serving. A serving is ½ cup of mashed potatoes or sweet potato; 1 cup winter squash; ½ of a small baked potato; ½ cup of cooked beans; or ½ cup cooked corn or green peas. · Learn how much carbs to eat each day and at each meal. A dietitian or CDE can teach you how to keep track of the amount of carbs you eat. This is called carbohydrate counting. · If you are not sure how to count carbohydrate grams, use the Plate Method to plan meals. It is a good, quick way to make sure that you have a balanced meal. It also helps you spread carbs throughout the day. ? Divide your plate by types of foods. Put non-starchy vegetables on half the plate, meat or other protein food on one-quarter of the plate, and a grain or starchy vegetable in the final quarter of the plate. To this you can add a small piece of fruit and 1 cup of milk or yogurt, depending on how many carbs you are supposed to eat at a meal.  · Try to eat about the same amount of carbs at each meal. Do not \"save up\" your daily allowance of carbs to eat at one meal.  · Proteins have very little or no carbs per serving. Examples of proteins are beef, chicken, turkey, fish, eggs, tofu, cheese, cottage cheese, and peanut butter. A serving size of meat is 3 ounces, which is about the size of a deck of cards. Examples of meat substitute serving sizes (equal to 1 ounce of meat) are 1/4 cup of cottage cheese, 1 egg, 1 tablespoon of peanut butter, and ½ cup of tofu. How can you eat out and still eat healthy? · Learn to estimate the serving sizes of foods that have carbohydrate. If you measure food at home, it will be easier to estimate the amount in a serving of restaurant food. · If the meal you order has too much carbohydrate (such as potatoes, corn, or baked beans), ask to have a low-carbohydrate food instead. Ask for a salad or green vegetables.   · If you use insulin, check your blood sugar before and after eating out to help you plan how much to eat in the future. · If you eat more carbohydrate at a meal than you had planned, take a walk or do other exercise. This will help lower your blood sugar. What are some tips for eating healthy? · Limit saturated fat, such as the fat from meat and dairy products. This is a healthy choice because people who have diabetes are at higher risk of heart disease. So choose lean cuts of meat and nonfat or low-fat dairy products. Use olive or canola oil instead of butter or shortening when cooking. · Don't skip meals. Your blood sugar may drop too low if you skip meals and take insulin or certain medicines for diabetes. · Check with your doctor before you drink alcohol. Alcohol can cause your blood sugar to drop too low. Alcohol can also cause a bad reaction if you take certain diabetes medicines. Follow-up care is a key part of your treatment and safety. Be sure to make and go to all appointments, and call your doctor if you are having problems. It's also a good idea to know your test results and keep a list of the medicines you take. Where can you learn more? Go to http://www.smallwood.com/  Enter I147 in the search box to learn more about \"Learning About Carbohydrate (Carb) Counting and Eating Out When You Have Diabetes. \"  Current as of: December 17, 2020               Content Version: 13.0  © 9790-0293 Healthwise, Incorporated. Care instructions adapted under license by [employer] (which disclaims liability or warranty for this information). If you have questions about a medical condition or this instruction, always ask your healthcare professional. [place] [address] any warranty or liability for your use of this information. Learning About the [address] Diet  What is the Mediterranean diet? The Mediterranean diet is a style of eating rather than a diet plan.  It features foods eaten in [place], [place], [place], and other countries along the [facility]. It emphasizes eating foods like fish, fruits, vegetables, beans, high-fiber breads and whole grains, nuts, and olive oil. This style of eating includes limited red meat, cheese, and sweets. Why choose the Mediterranean diet? A Mediterranean-style diet may improve heart health. It contains more fat than other heart-healthy diets. But the fats are mainly from nuts, unsaturated oils (such as fish oils and olive oil), and certain nut or seed oils (such as canola, soybean, or flaxseed oil). These fats may help protect the heart and blood vessels. How can you get started on the Mediterranean diet? Here are some things you can do to switch to a more Mediterranean way of eating. What to eat  · Eat a variety of fruits and vegetables each day, such as grapes, blueberries, tomatoes, broccoli, peppers, figs, olives, spinach, eggplant, beans, lentils, and chickpeas. · Eat a variety of whole-grain foods each day, such as oats, brown rice, and whole wheat bread, pasta, and couscous. · Eat fish at least 2 times a week. Try tuna, salmon, mackerel, lake trout, herring, or sardines. · Eat moderate amounts of low-fat dairy products, such as milk, cheese, or yogurt. · Eat moderate amounts of poultry and eggs. · Choose healthy (unsaturated) fats, such as nuts, olive oil, and certain nut or seed oils like canola, soybean, and flaxseed. · Limit unhealthy (saturated) fats, such as butter, palm oil, and coconut oil. And limit fats found in animal products, such as meat and dairy products made with whole milk. Try to eat red meat only a few times a month in very small amounts. · Limit sweets and desserts to only a few times a week. This includes sugar-sweetened drinks like soda. The Mediterranean diet may also include red wine with your meal--1 glass each day for women and up to 2 glasses a day for men.   Tips for eating at home  · Use herbs, spices, garlic, lemon zest, and citrus juice instead of salt to add flavor to foods. · Add avocado slices to your sandwich instead of lucio. · Have fish for lunch or dinner instead of red meat. Brush the fish with olive oil, and broil or grill it. · Sprinkle your salad with seeds or nuts instead of cheese. · Cook with olive or canola oil instead of butter or oils that are high in saturated fat. · Switch from 2% milk or whole milk to 1% or fat-free milk. · Dip raw vegetables in a vinaigrette dressing or hummus instead of dips made from mayonnaise or sour cream.  · Have a piece of fruit for dessert instead of a piece of cake. Try baked apples, or have some dried fruit. Tips for eating out  · Try broiled, grilled, baked, or poached fish instead of having it fried or breaded. · Ask your  to have your meals prepared with olive oil instead of butter. · Order dishes made with marinara sauce or sauces made from olive oil. Avoid sauces made from cream or mayonnaise. · Choose whole-grain breads, whole wheat pasta and pizza crust, brown rice, beans, and lentils. · Cut back on butter or margarine on bread. Instead, you can dip your bread in a small amount of olive oil. · Ask for a side salad or grilled vegetables instead of french fries or chips. Where can you learn more? Go to http://www.[employer].com/  Enter O407 in the search box to learn more about \"Learning About the Mediterranean Diet. \"  Current as of: December 17, 2020               Content Version: 13.0  © 0443-0662 [employer]. Care instructions adapted under license by [employer] (which disclaims liability or warranty for this information). If you have questions about a medical condition or this instruction, always ask your healthcare professional. [address] any warranty or liability for your use of this information.            Hypothyroidism: Care Instructions  Your Care Instructions     When you have hypothyroidism, your body doesn't make enough thyroid hormone. This hormone helps your body use energy. If your thyroid level is low, you may feel tired, be constipated, have an increase in your blood pressure, or have dry skin or memory problems. You may also get cold easily, even when it is warm. Women with low thyroid levels may have heavy menstrual periods. A blood test to find your thyroid-stimulating hormone (TSH) level is used to check for hypothyroidism. A high TSH level may mean that you have it. The treatment for hypothyroidism is thyroid hormone pills. You should start to feel better in 1 to 2 weeks. Most people need treatment for the rest of their lives. You will need regular visits with your doctor to make sure you are doing well and that you have the right dose of medicine. Follow-up care is a key part of your treatment and safety. Be sure to make and go to all appointments, and call your doctor if you are having problems. It's also a good idea to know your test results and keep a list of the medicines you take. How can you care for yourself at home? · Take your thyroid hormone medicine exactly as prescribed. Call your doctor if you think you are having a problem with your medicine. Most people do not have side effects if they take the right amount of medicine regularly. ? Take the medicine 30 minutes before breakfast, and do not take it with calcium, vitamins, or iron. ? Do not take extra doses of your thyroid medicine. It will not help you get better any faster, and it may cause side effects. ? If you forget to take a dose, do NOT take a double dose of medicine. Take your usual dose the next day. · Tell your doctor about all prescription, herbal, or over-the-counter products you take. · Take care of yourself. Eat a healthy diet, get enough sleep, and get regular exercise. When should you call for help? Call 911 anytime you think you may need emergency care. For example, call if:    · You passed out (lost consciousness).     · You have severe trouble breathing.     · You have a very slow heartbeat (less than 60 beats a minute).     · You have a low body temperature (95°F or below). Call your doctor now or seek immediate medical care if:    · You feel tired, sluggish, or weak.     · You have trouble remembering things or concentrating.     · You do not begin to feel better 2 weeks after starting your medicine. Watch closely for changes in your health, and be sure to contact your doctor if you have any problems. Where can you learn more? Go to http://www.gray.com/  Enter T614 in the search box to learn more about \"Hypothyroidism: Care Instructions. \"  Current as of: December 2, 2020               Content Version: 13.0  © 2006-2021 [employer]. Care instructions adapted under license by [facility] (which disclaims liability or warranty for this information). If you have questions about a medical condition or this instruction, always ask your healthcare professional. [address] any warranty or liability for your use of this information. Dapagliflozin (By mouth)   Dapagliflozin ([id number]-[id number]-zin)  Treats type 2 diabetes. Brand Name(s): Farxiga   There may be other brand names for this medicine. When This Medicine Should Not Be Used: This medicine is not right for everyone. Do not use it if you had an allergic reaction to dapagliflozin. How to Use This Medicine:   Tablet  · Take your medicine as directed. Your dose may need to be changed several times to find what works best for you. This medicine is usually taken in the morning. · This medicine should come with a Medication Guide. Ask your pharmacist for a copy if you do not have one. · Missed dose: Take a dose as soon as you remember. If it is almost time for your next dose, wait until then and take a regular dose.  Do not take extra medicine to make up for a missed dose. · Store the medicine in a closed container at room temperature, away from heat, moisture, and direct light. Drugs and Foods to Avoid:   Ask your doctor or pharmacist before using any other medicine, including over-the-counter medicines, vitamins, and herbal products. · Some foods and medicines can affect how dapagliflozin works. Tell your doctor if you are using any of the following:  ¨ Diuretic (water pill)  ¨ Insulin or other diabetes medicine  Warnings While Using This Medicine:   · Tell your doctor if you are pregnant or breastfeeding, or if you have kidney disease, liver disease, high cholesterol, or a history of pancreas problems, genital yeast or urinary tract infections, or bladder cancer. Tell your doctor if you are on a low-salt diet or if you drink alcohol. · This medicine may cause the following problems:  ¨ Low blood pressure  ¨ Ketoacidosis (high ketones and acid in the blood)  ¨ Kidney damage  ¨ Increased risk of genital yeast or urinary tract infections  ¨ Low blood sugar levels  · Tell any doctor or dentist who treats you that you are using this medicine. This medicine may affect certain medical test results. This medicine may affect the results of urine glucose tests. · Your doctor will do lab tests at regular visits to check on the effects of this medicine. Keep all appointments. · Keep all medicine out of the reach of children. Never share your medicine with anyone.   Possible Side Effects While Using This Medicine:   Call your doctor right away if you notice any of these side effects:  · Allergic reaction: Itching or hives, swelling in your face or hands, swelling or tingling in your mouth or throat, chest tightness, trouble breathing  · Change in how much or how often you urinate, bloody urine, painful or difficult urination, lower back or side pain, fever, chills  · Increased hunger, headache, confusion, shaking, trembling, sweating  · Lightheadedness, dizziness, fainting  · Rapid weight gain, swelling in your hands, ankles, or feet  · Trouble breathing, tiredness, stomach pain, nausea, vomiting  If you notice these less serious side effects, talk with your doctor:   · Redness, itching, pain, or swelling of the penis, bad-smelling discharge from the penis  · White or yellow vaginal discharge, vaginal itching or odor  If you notice other side effects that you think are caused by this medicine, tell your doctor. Call your doctor for medical advice about side effects. You may report side effects to FDA at 9-619-FDA-4369  © 2017 [facility] Information is for End User's use only and may not be sold, redistributed or otherwise used for commercial purposes. The above information is an  only. It is not intended as medical advice for individual conditions or treatments. Talk to your doctor, nurse or pharmacist before following any medical regimen to see if it is safe and effective for you.

## 2022-03-20 PROBLEM — E78.2 MIXED HYPERLIPIDEMIA: Status: ACTIVE | Noted: 2018-08-09

## 2022-05-23 ENCOUNTER — HOSPITAL ENCOUNTER (OUTPATIENT)
Dept: LAB | Age: 67
Discharge: HOME | End: 2022-05-23
Payer: MEDICARE

## 2022-05-23 DIAGNOSIS — E78.2 MIXED HYPERLIPIDEMIA: ICD-10-CM

## 2022-05-23 DIAGNOSIS — E11.9 TYPE 2 DIABETES MELLITUS WITHOUT COMPLICATION, WITH LONG-TERM CURRENT USE OF INSULIN (HCC): ICD-10-CM

## 2022-05-23 DIAGNOSIS — Z79.4 TYPE 2 DIABETES MELLITUS WITHOUT COMPLICATION, WITH LONG-TERM CURRENT USE OF INSULIN (HCC): ICD-10-CM

## 2022-05-23 DIAGNOSIS — I10 ESSENTIAL HYPERTENSION, BENIGN: ICD-10-CM

## 2022-05-23 DIAGNOSIS — E03.9 ACQUIRED HYPOTHYROIDISM: ICD-10-CM

## 2022-05-23 LAB
ALBUMIN SERPL-MCNC: 3.6 G/DL (ref 3.4–5)
ALBUMIN/GLOB SERPL: 1 {RATIO} (ref 0.8–1.7)
ALP SERPL-CCNC: 75 U/L (ref 45–117)
ALT SERPL-CCNC: 14 U/L (ref 13–56)
ANION GAP SERPL CALC-SCNC: 6 MMOL/L (ref 3–18)
AST SERPL-CCNC: 13 U/L (ref 10–38)
BASOPHILS # BLD: 0.1 K/UL (ref 0–0.1)
BASOPHILS NFR BLD: 1 % (ref 0–2)
BILIRUB SERPL-MCNC: 0.5 MG/DL (ref 0.2–1)
BUN SERPL-MCNC: 24 MG/DL (ref 7–18)
BUN/CREAT SERPL: 23 (ref 12–20)
CALCIUM SERPL-MCNC: 9.6 MG/DL (ref 8.5–10.1)
CHLORIDE SERPL-SCNC: 107 MMOL/L (ref 100–111)
CHOLEST SERPL-MCNC: 151 MG/DL
CO2 SERPL-SCNC: 27 MMOL/L (ref 21–32)
CREAT SERPL-MCNC: 1.04 MG/DL (ref 0.6–1.3)
CREAT UR-MCNC: 158 MG/DL (ref 30–125)
DIFFERENTIAL METHOD BLD: ABNORMAL
EOSINOPHIL # BLD: 0.5 K/UL (ref 0–0.4)
EOSINOPHIL NFR BLD: 4 % (ref 0–5)
ERYTHROCYTE [DISTWIDTH] IN BLOOD BY AUTOMATED COUNT: 13.2 % (ref 11.6–14.5)
EST. AVERAGE GLUCOSE BLD GHB EST-MCNC: 174 MG/DL
GLOBULIN SER CALC-MCNC: 3.6 G/DL (ref 2–4)
GLUCOSE SERPL-MCNC: 109 MG/DL (ref 74–99)
HBA1C MFR BLD: 7.7 % (ref 4.2–5.6)
HCT VFR BLD AUTO: 40.3 % (ref 35–45)
HDLC SERPL-MCNC: 41 MG/DL (ref 40–60)
HDLC SERPL: 3.7 {RATIO} (ref 0–5)
HGB BLD-MCNC: 13.2 G/DL (ref 12–16)
IMM GRANULOCYTES # BLD AUTO: 0.1 K/UL (ref 0–0.04)
IMM GRANULOCYTES NFR BLD AUTO: 1 % (ref 0–0.5)
LDLC SERPL CALC-MCNC: 75.8 MG/DL (ref 0–100)
LIPID PROFILE,FLP: ABNORMAL
LYMPHOCYTES # BLD: 2.7 K/UL (ref 0.9–3.6)
LYMPHOCYTES NFR BLD: 21 % (ref 21–52)
MCH RBC QN AUTO: 29.1 PG (ref 24–34)
MCHC RBC AUTO-ENTMCNC: 32.8 G/DL (ref 31–37)
MCV RBC AUTO: 88.8 FL (ref 78–100)
MICROALBUMIN UR-MCNC: 17.1 MG/DL (ref 0–3)
MICROALBUMIN/CREAT UR-RTO: 108 MG/G (ref 0–30)
MONOCYTES # BLD: 0.8 K/UL (ref 0.05–1.2)
MONOCYTES NFR BLD: 7 % (ref 3–10)
NEUTS SEG # BLD: 8.7 K/UL (ref 1.8–8)
NEUTS SEG NFR BLD: 68 % (ref 40–73)
NRBC # BLD: 0 K/UL (ref 0–0.01)
NRBC BLD-RTO: 0 PER 100 WBC
PLATELET # BLD AUTO: 390 K/UL (ref 135–420)
PMV BLD AUTO: 9.5 FL (ref 9.2–11.8)
POTASSIUM SERPL-SCNC: 4.8 MMOL/L (ref 3.5–5.5)
PROT SERPL-MCNC: 7.2 G/DL (ref 6.4–8.2)
RBC # BLD AUTO: 4.54 M/UL (ref 4.2–5.3)
SODIUM SERPL-SCNC: 140 MMOL/L (ref 136–145)
TRIGL SERPL-MCNC: 171 MG/DL (ref ?–150)
TSH SERPL DL<=0.05 MIU/L-ACNC: 1.83 UIU/ML (ref 0.36–3.74)
VLDLC SERPL CALC-MCNC: 34.2 MG/DL
WBC # BLD AUTO: 12.8 K/UL (ref 4.6–13.2)

## 2022-05-23 PROCEDURE — 84443 ASSAY THYROID STIM HORMONE: CPT

## 2022-05-23 PROCEDURE — 80061 LIPID PANEL: CPT

## 2022-05-23 PROCEDURE — 85025 COMPLETE CBC W/AUTO DIFF WBC: CPT

## 2022-05-23 PROCEDURE — 82043 UR ALBUMIN QUANTITATIVE: CPT

## 2022-05-23 PROCEDURE — 80053 COMPREHEN METABOLIC PANEL: CPT

## 2022-05-23 PROCEDURE — 36415 COLL VENOUS BLD VENIPUNCTURE: CPT

## 2022-05-23 PROCEDURE — 83036 HEMOGLOBIN GLYCOSYLATED A1C: CPT

## 2022-05-27 PROBLEM — N18.30 CHRONIC RENAL DISEASE, STAGE III (HCC): Status: ACTIVE | Noted: 2022-05-27

## 2022-05-27 PROBLEM — E11.22 TYPE 2 DIABETES MELLITUS WITH CHRONIC KIDNEY DISEASE (HCC): Status: ACTIVE | Noted: 2022-05-27

## 2022-05-27 NOTE — PATIENT INSTRUCTIONS
Hyperlipidemia: After Your Visit  Your Care Instructions  Hyperlipidemia is too much fat in your blood. The body has several kinds of fat, including cholesterol and triglycerides. Your body needs fat for many things, such as making new cells. But too much fat in your blood increases your chances of having a heart attack or stroke. You may be able to lower your cholesterol and triglycerides with a heart-healthy diet, exercise, and if needed, medicine. Your doctor may want you to try lifestyle changes first to see whether they lower the fat in your blood. You may need to take medicine if lifestyle changes do not lower the fat in your blood enough. Follow-up care is a key part of your treatment and safety. Be sure to make and go to all appointments, and call your doctor if you are having problems. Its also a good idea to know your test results and keep a list of the medicines you take. How can you care for yourself at home? Take your medicines  · Take your medicines exactly as prescribed. Call your doctor if you think you are having a problem with your medicine. · If you take medicine to lower your cholesterol, go to follow-up visits. You will need to have blood tests. · Do not take large doses of niacin, which is a B vitamin, while taking medicine called statins. It may increase the chance of muscle pain and liver problems. · Talk to your doctor about avoiding grapefruit juice if you are taking statins. Grapefruit juice can raise the level of this medicine in your blood. This could increase side effects. Eat more fruits, vegetables, and fiber  · Fruits and vegetables have lots of nutrients that help protect against heart disease, and they have little--if any--fat. Try to eat at least five servings a day. Dark green, deep orange, or yellow fruits and vegetables are healthy choices. · Keep carrots, celery, and other veggies handy for snacks.  Buy fruit that is in season and store it where you can see it so that you will be tempted to eat it. Cook dishes that have a lot of veggies in them, such as stir-fries and soups. · Foods high in fiber may reduce your cholesterol and provide important vitamins and minerals. High-fiber foods include whole-grain cereals and breads, oatmeal, beans, brown rice, citrus fruits, and apples. · Buy whole-grain breads and cereals instead of white bread and pastries. Limit saturated fat  · Read food labels and try to avoid saturated fat and trans fat. They increase your risk of heart disease. · Use olive or canola oil when you cook. Try cholesterol-lowering spreads, such as Benecol or Take Control. · Bake, broil, grill, or steam foods instead of frying them. · Limit the amount of high-fat meats you eat, including hot dogs and sausages. Cut out all visible fat when you prepare meat. · Eat fish, skinless poultry, and soy products such as tofu instead of high-fat meats. Soybeans may be especially good for your heart. Eat at least two servings of fish a week. Certain fish, such as salmon, contain omega-3 fatty acids, which may help reduce your risk of heart attack. · Choose low-fat or fat-free milk and dairy products. Get exercise, limit alcohol, and quit smoking  · Get more exercise. Work with your doctor to set up an exercise program. Even if you can do only a small amount, exercise will help you get stronger, have more energy, and manage your weight and your stress. Walking is an easy way to get exercise. Gradually increase the amount you walk every day. Aim for at least 30 minutes on most days of the week. You also may want to swim, bike, or do other activities. · Limit alcohol to no more than 2 drinks a day for men and 1 drink a day for women. · Do not smoke. If you need help quitting, talk to your doctor about stop-smoking programs and medicines. These can increase your chances of quitting for good. When should you call for help?   Call 911 anytime you think you may need emergency care. For example, call if:  · You have symptoms of a heart attack. These may include:  ¨ Chest pain or pressure, or a strange feeling in the chest.  ¨ Sweating. ¨ Shortness of breath. ¨ Nausea or vomiting. ¨ Pain, pressure, or a strange feeling in the back, neck, jaw, or upper belly or in one or both shoulders or arms. ¨ Lightheadedness or sudden weakness. ¨ A fast or irregular heartbeat. After you call 911, the  may tell you to chew 1 adult-strength or 2 to 4 low-dose aspirin. Wait for an ambulance. Do not try to drive yourself. · You have signs of a stroke. These may include:  ¨ Sudden numbness, paralysis, or weakness in your face, arm, or leg, especially on only one side of your body. ¨ New problems with walking or balance. ¨ Sudden vision changes. ¨ Drooling or slurred speech. ¨ New problems speaking or understanding simple statements, or feeling confused. ¨ A sudden, severe headache that is different from past headaches. · You passed out (lost consciousness). Call your doctor now or seek immediate medical care if:  · You have muscle pain or weakness. Watch closely for changes in your health, and be sure to contact your doctor if:  · You are very tired. · You have an upset stomach, gas, constipation, or belly pain or cramps. Where can you learn more? Go to [employer]  Enter C406 in the search box to learn more about \"Hyperlipidemia: After Your Visit. \"   © 7553-5532 [employer]. Care instructions adapted under license by [employer] (which disclaims liability or warranty for this information). This care instruction is for use with your licensed healthcare professional. If you have questions about a medical condition or this instruction, always ask your healthcare professional. [place] [address] any warranty or liability for your use of this information.   Content Version: 8.4.869318; Last Revised: October 13, 2011                 Type 2 Diabetes: Care Instructions  Your Care Instructions     Type 2 diabetes is a disease that develops when the body's tissues cannot use insulin properly. Over time, the pancreas cannot make enough insulin. Insulin is a hormone that helps the body's cells use sugar (glucose) for energy. It also helps the body store extra sugar in muscle, fat, and liver cells. Without insulin, the sugar cannot get into the cells to do its work. It stays in the blood instead. This can cause high blood sugar levels. A person has diabetes when the blood sugar stays too high too much of the time. Over time, diabetes can lead to diseases of the heart, blood vessels, nerves, kidneys, and eyes. You may be able to control your blood sugar by losing weight, eating a healthy diet, and getting daily exercise. You may also have to take insulin or other diabetes medicine. Follow-up care is a key part of your treatment and safety. Be sure to make and go to all appointments. Call your doctor if you are having problems. It's also a good idea to know your test results and keep a list of the medicines you take. How can you care for yourself at home? · Keep your blood sugar at a target level (which you set with your doctor). ? Carbohydrate--the body's main source of fuel--affects blood sugar more than any other nutrient. Carbohydrate is in fruits, vegetables, milk, and yogurt. It also is in breads, cereals, vegetables such as potatoes and corn, and sugary foods such as candy and cakes. Follow your meal plan to know how much carbohydrate to eat at each meal and snack. ? Aim for 30 minutes of exercise on most, preferably all, days of the week. Walking is a good choice. You also may want to do other activities, such as running, swimming, cycling, or playing tennis or team sports. Try to do muscle-strengthening exercises at least 2 times a week. ? Take your medicines exactly as prescribed.  Call your doctor if you think you are having a problem with your medicine. You will get more details on the specific medicines your doctor prescribes. · Check your blood sugar as often as your doctor recommends. It is important to keep track of any symptoms you have, such as low blood sugar. Also tell your doctor if you have any changes in your activities, diet, or insulin use. · Talk to your doctor before you start taking aspirin every day. Aspirin can help certain people lower their risk of a heart attack or stroke. But taking aspirin isn't right for everyone, because it can cause serious bleeding. · Do not smoke. If you need help quitting, talk to your doctor about stop-smoking programs and medicines. These can increase your chances of quitting for good. · Keep your cholesterol and blood pressure at normal levels. You may need to take one or more medicines to reach your goals. Take them exactly as directed. Do not stop or change a medicine without talking to your doctor first.  When should you call for help? Call 911 anytime you think you may need emergency care. For example, call if:    · You passed out (lost consciousness), or you suddenly become very sleepy or confused. (You may have very low blood sugar.)   Call your doctor now or seek immediate medical care if:    · Your blood sugar is 300 mg/dL or is higher than the level your doctor has set for you. · You have symptoms of low blood sugar, such as:  ? Sweating. ? Feeling nervous, shaky, and weak. ? Extreme hunger and slight nausea. ? Dizziness and headache.  ? Blurred vision. ? Confusion. Watch closely for changes in your health, and be sure to contact your doctor if:    · You often have problems controlling your blood sugar. · You have symptoms of long-term diabetes problems, such as:  ? New vision changes. ? New pain, numbness, or tingling in your hands or feet. ? Skin problems. Where can you learn more?   Go to http://www.gray.com/  Enter C553 in the search box to learn more about \"Type 2 Diabetes: Care Instructions. \"  Current as of: August 31, 2020               Content Version: 12.8  © 2006-2021 [employer]. Care instructions adapted under license by [employer] (which disclaims liability or warranty for this information). If you have questions about a medical condition or this instruction, always ask your healthcare professional. [address] any warranty or liability for your use of this information. High Blood Pressure: Care Instructions  Overview     It's normal for blood pressure to go up and down throughout the day. But if it stays up, you have high blood pressure. Another name for high blood pressure is hypertension. Despite what a lot of people think, high blood pressure usually doesn't cause headaches or make you feel dizzy or lightheaded. It usually has no symptoms. But it does increase your risk of stroke, heart attack, and other problems. You and your doctor will talk about your risks of these problems based on your blood pressure. Your doctor will give you a goal for your blood pressure. Your goal will be based on your health and your age. Lifestyle changes, such as eating healthy and being active, are always important to help lower blood pressure. You might also take medicine to reach your blood pressure goal.  Follow-up care is a key part of your treatment and safety. Be sure to make and go to all appointments, and call your doctor if you are having problems. It's also a good idea to know your test results and keep a list of the medicines you take. How can you care for yourself at home? Medical treatment  · If you stop taking your medicine, your blood pressure will go back up. You may take one or more types of medicine to lower your blood pressure. Be safe with medicines. Take your medicine exactly as prescribed.  Call your doctor if you think you are having a problem with your medicine. · Talk to your doctor before you start taking aspirin every day. Aspirin can help certain people lower their risk of a heart attack or stroke. But taking aspirin isn't right for everyone, because it can cause serious bleeding. · See your doctor regularly. You may need to see the doctor more often at first or until your blood pressure comes down. · If you are taking blood pressure medicine, talk to your doctor before you take decongestants or anti-inflammatory medicine, such as ibuprofen. Some of these medicines can raise blood pressure. · Learn how to check your blood pressure at home. Lifestyle changes  · Stay at a healthy weight. This is especially important if you put on weight around the waist. Losing even 10 pounds can help you lower your blood pressure. · If your doctor recommends it, get more exercise. Walking is a good choice. Bit by bit, increase the amount you walk every day. Try for at least 30 minutes on most days of the week. You also may want to swim, bike, or do other activities. · Avoid or limit alcohol. Talk to your doctor about whether you can drink any alcohol. · Try to limit how much sodium you eat to less than 2,300 milligrams (mg) a day. Your doctor may ask you to try to eat less than 1,500 mg a day. · Eat plenty of fruits (such as bananas and oranges), vegetables, legumes, whole grains, and low-fat dairy products. · Lower the amount of saturated fat in your diet. Saturated fat is found in animal products such as milk, cheese, and meat. Limiting these foods may help you lose weight and also lower your risk for heart disease. · Do not smoke. Smoking increases your risk for heart attack and stroke. If you need help quitting, talk to your doctor about stop-smoking programs and medicines. These can increase your chances of quitting for good. When should you call for help? Call  911 anytime you think you may need emergency care.  This may mean having symptoms that suggest that your blood pressure is causing a serious heart or blood vessel problem. Your blood pressure may be over 180/120. For example, call 911 if:    · You have symptoms of a heart attack. These may include:  ? Chest pain or pressure, or a strange feeling in the chest.  ? Sweating. ? Shortness of breath. ? Nausea or vomiting. ? Pain, pressure, or a strange feeling in the back, neck, jaw, or upper belly or in one or both shoulders or arms. ? Lightheadedness or sudden weakness. ? A fast or irregular heartbeat. · You have symptoms of a stroke. These may include:  ? Sudden numbness, tingling, weakness, or loss of movement in your face, arm, or leg, especially on only one side of your body. ? Sudden vision changes. ? Sudden trouble speaking. ? Sudden confusion or trouble understanding simple statements. ? Sudden problems with walking or balance. ? A sudden, severe headache that is different from past headaches. · You have severe back or belly pain. Do not wait until your blood pressure comes down on its own. Get help right away. Call your doctor now or seek immediate care if:    · Your blood pressure is much higher than normal (such as 180/120 or higher), but you don't have symptoms. Hypothyroidism: Care Instructions  Your Care Instructions     When you have hypothyroidism, your body doesn't make enough thyroid hormone. This hormone helps your body use energy. If your thyroid level is low, you may feel tired, be constipated, have an increase in your blood pressure, or have dry skin or memory problems. You may also get cold easily, even when it is warm. Women with low thyroid levels may have heavy menstrual periods. A blood test to find your thyroid-stimulating hormone (TSH) level is used to check for hypothyroidism. A high TSH level may mean that you have it. The treatment for hypothyroidism is thyroid hormone pills. You should start to feel better in 1 to 2 weeks.  Most people need treatment Oxygen/100 nrb for the rest of their lives. You will need regular visits with your doctor to make sure you are doing well and that you have the right dose of medicine. Follow-up care is a key part of your treatment and safety. Be sure to make and go to all appointments, and call your doctor if you are having problems. It's also a good idea to know your test results and keep a list of the medicines you take. How can you care for yourself at home? · Take your thyroid hormone medicine exactly as prescribed. Call your doctor if you think you are having a problem with your medicine. Most people do not have side effects if they take the right amount of medicine regularly. ? Take the medicine 30 minutes before breakfast, and do not take it with calcium, vitamins, or iron. ? Do not take extra doses of your thyroid medicine. It will not help you get better any faster, and it may cause side effects. ? If you forget to take a dose, do NOT take a double dose of medicine. Take your usual dose the next day. · Tell your doctor about all prescription, herbal, or over-the-counter products you take. · Take care of yourself. Eat a healthy diet, get enough sleep, and get regular exercise. When should you call for help? Call 911 anytime you think you may need emergency care. For example, call if:    · You passed out (lost consciousness).     · You have severe trouble breathing.     · You have a very slow heartbeat (less than 60 beats a minute).     · You have a low body temperature (95°F or below). Call your doctor now or seek immediate medical care if:    · You feel tired, sluggish, or weak.     · You have trouble remembering things or concentrating.     · You do not begin to feel better 2 weeks after starting your medicine. Watch closely for changes in your health, and be sure to contact your doctor if you have any problems. Where can you learn more?   Go to http://www.gray.com/  Enter I577 in the search box to learn more about \"Hypothyroidism: Care Instructions. \"  Current as of: July 28, 2021               Content Version: 13.2  © 2006-2022 [employer] Care instructions adapted under license by [employer] (which disclaims liability or warranty for this information). If you have questions about a medical condition or this instruction, always ask your healthcare professional. [address] any warranty or liability for your use of this information. · You think high blood pressure is causing symptoms, such as:  ? Severe headache.  ? Blurry vision. Watch closely for changes in your health, and be sure to contact your doctor if:    · Your blood pressure measures higher than your doctor recommends at least 2 times. That means the top number is higher or the bottom number is higher, or both. · You think you may be having side effects from your blood pressure medicine. Where can you learn more? Go to http://www.gray.com/  Enter [id number] in the search box to learn more about \"High Blood Pressure: Care Instructions. \"  Current as of: August 31, 2020               Content Version: 12.8  © 2006-2021 [employer] Care instructions adapted under license by [employer] (which disclaims liability or warranty for this information). If you have questions about a medical condition or this instruction, always ask your healthcare professional. [address] any warranty or liability for your use of this information.

## 2022-05-27 NOTE — PROGRESS NOTES
ICD-10-CM ICD-9-CM    1. Essential hypertension, benign  I10 401.1    2. Stage 3a chronic kidney disease (HCC)  N18.31 585.3    3. Type 2 diabetes mellitus without complication, with long-term current use of insulin (HCC)  E11.9 250.00     Z79.4 V58.67    4. Type 2 diabetes mellitus with chronic kidney disease, without long-term current use of insulin, unspecified CKD stage (formerly [facility])  E11.22 250.40      585.9    5. Mixed hyperlipidemia  E78.2 272.2    6. Acquired hypothyroidism  E03.9 244.9    7. Obesity, Class III, BMI 40-49.9 (morbid obesity) (formerly [facility])  E66.01 278.01    8. Disorder of bone and cartilage  M89.9 733.90     M94.9     9. Encounter for screening mammogram for malignant neoplasm of breast  Z12.31 V76.12        Assessment and plan  Essential hypertension, chronic problem. Still elevated on 4 medications. We will can a recheck the blood pressure next visit. We will review what we can add. Added Cardura. Hyperlipidemia. Chronic problem well-controlled. Acquired hypothyroidism, chronic problem. Good control today. Follow. Labs next visit. No change in meds. Unhealthy weight discussed diet and exercise. Diabetes mellitus not in control. Hemoglobin A1c is 7.7. On glipizide. Also on Januvia. Will increase Insulin to 62 units BID  Unable to afford the farxiga. Continue the other meds. Degenerative joint disease. Using Aleve with good success. Does not want the DEXA scan done. She is arranging for the mammogram on her own. She is due to have an eye exam in September 2022. Lab results and schedule of future lab studies reviewed with patient  Diagnostic and radiologic results and the schedule of future studies were reviewed with the patient  All questions answered and understood.   Health Maintenance Due   Topic Date Due    Bone Densitometry (Dexa) Screening  Never done    Eye Exam Retinal or Dilated  07/03/2019    Breast Cancer Screen Mammogram  06/12/2021     Arranging mammogram  Does not martha DEXA  For eye check September. Subjective:   Beckley Jovan is a 75 y.o. female has Gross hematuria, Recurrent UTI, Morbid obesity ([address] Incisional hernia, Esophagitis, unspecified, Generalized osteoarthrosis, involving multiple sites, Unspecified venous (peripheral) insufficiency, Hypothyroidism, Essential hypertension, benign, DM type 2 (diabetes mellitus, type 2) ([address] Fibrocystic change of breast, Mixed hyperlipidemia, Chronic renal disease, stage III, and Type 2 diabetes mellitus with chronic kidney disease ([address] on their problem list..  Chief Complaint   Patient presents with    Follow Up Chronic Condition        Seen previously by our practice on 2/23/2022  Labs 5/23/2022 CBC within normal limits. CMP glucose 109, hemoglobin A1c 7.7. No change. Microalbumin to creatinine ratio 108. TSH 1.83 improved from 7.58 last visit. Now in normal range. Creatinine 1.04, GFR estimated to be 53. Total cholesterol 151 triglycerides 171 LDL cholesterol 75.8. The CMP showed a glucose of 200 GFR approximately 52, albumin low at 3.3 and otherwise unremarkable. Hemoglobin A1c 7.7 with an average glucose of 174. Microalbumin to creatinine ratio 385, elevated. Total cholesterol 138 LDL cholesterol 70.8. TSH 7.58. Elevated. Essential hypertension. Elevated reading today. We will not change as this is the first visit to us. Adjust meds if elevated on next visit. Type 2 diabetes mellitus, insulin requiring. Not controlled in May 2021 and not at target of less than 7. Tired Follow-up labs ordered. If elevated with new labs would adjust medication. Mixed hyperlipidemia. Chronic problem. Not controlled in May 2021. Follow-up labs ordered. If still elevated adjust meds. Acquired hypothyroidism. TSH elevated on last visit in May 2021. Repeat level ordered. If TSH elevated would increase the Synthroid.   reviewed diet, exercise and weight control    Hypertension  The patient has no headaches, visual changes, chest pain or pressure,dyspnea, orthopnea, or PND. There is no problem with the medication. Rechecking on elevated blood pressure at this time. She has been taking the medication regularly. She had previously been on metoprolol but this caused a significant bradycardia. No history of secondary causes for hypertension. Diabetes mellitus  The patient denies polyuria, polydipsia, or polyphagia. The patient denies any other aggravating or relieving factors There has been no problem with the medications. She has not adhered to any specific diet. Hyperlipidemia   The patient denies any myalgias or weakness. No abdominal discomfort admitted to. The patient denies any difficulty with or side effects from the medication. BP Readings from Last 3 Encounters:   05/31/22 (!) 160/68   02/23/22 (!) 160/65   11/02/21 (!) 165/62     Please press want to confirm that you are ready to be connected with hello this is Dr. Nick Lafleur Ms. speak to Ms. Vaughn please all right very good okay to get your date of birth just for confirmation all right how can I help you today I can get the day will questioning what medications are you taking back left back so heavy had any fever or chills 0.2 and with C any discomfort anywhere in the nose or face he has about the left side of back of have you had this before I have I was then treated is a very good he have any other medical problems    Lab Results   Component Value Date/Time    Hemoglobin A1c 7.7 (H) 05/23/2022 01:09 PM     Lab Results   Component Value Date/Time    Cholesterol, total 151 05/23/2022 01:09 PM    HDL Cholesterol 41 05/23/2022 01:09 PM    LDL, calculated 75.8 05/23/2022 01:09 PM    VLDL, calculated 34.2 05/23/2022 01:09 PM    Triglyceride 171 (H) 05/23/2022 01:09 PM    CHOL/HDL Ratio 3.7 05/23/2022 01:09 PM       Hypothyroidism  The patient denies fatigue, cold intolerance, weight gain, constipation, dry skin, myalgia, constipation, joint aches or menstrual irregularities. There is no history of cognitive , dry skin hoarseness or edema. The patient has not noticed a puffy face, tongue enlargement, or loss of  hair or eyebrows. The patient has no problem with her medication. Lab Results   Component Value Date/Time    TSH 1.83 05/23/2022 01:09 PM       Lab Results   Component Value Date/Time    Sodium 140 05/23/2022 01:09 PM    Potassium 4.8 05/23/2022 01:09 PM    Chloride 107 05/23/2022 01:09 PM    CO2 27 05/23/2022 01:09 PM    Anion gap 6 05/23/2022 01:09 PM    Glucose 109 (H) 05/23/2022 01:09 PM    BUN 24 (H) 05/23/2022 01:09 PM    Creatinine 1.04 05/23/2022 01:09 PM    BUN/Creatinine ratio 23 (H) 05/23/2022 01:09 PM    GFR est AA >60 05/23/2022 01:09 PM    GFR est non-AA 53 (L) 05/23/2022 01:09 PM    Calcium 9.6 05/23/2022 01:09 PM     Lab Results   Component Value Date/Time    WBC 12.8 05/23/2022 01:09 PM    HGB 13.2 05/23/2022 01:09 PM    HCT 40.3 05/23/2022 01:09 PM    PLATELET 709 18/14/1594 01:09 PM    MCV 88.8 05/23/2022 01:09 PM     Smoker   75 y.o.. reports that she quit smoking about 9 years ago. Her smoking use included cigarettes. She has never used smokeless tobacco.  All told smoked 25 years or less, Intermittent smoker. 1 ppd aware of the side effects of smoking. She wants to us to recount the years she smoked which may be less than 20 but she is not sure. Does not want a CT scan ordered at this time. Aware of the risk benefits and alternatives. Review of Systems   Constitutional: Negative for chills and fever. Cold intolerance   HENT: Negative for hearing loss. Eyes: Negative for blurred vision, discharge and redness. Cataracts both eyes   Respiratory: Negative for cough and shortness of breath. Cardiovascular: Positive for leg swelling. Negative for chest pain, palpitations and orthopnea. Cardiac murmur   Gastrointestinal: Negative for abdominal pain, blood in stool, constipation, diarrhea and melena. Right-sided ventral hernia   Genitourinary: Negative for dysuria, frequency and urgency. Musculoskeletal: Negative for joint pain and myalgias. Skin: Negative for rash (dishydrotic eczema). Neurological: Negative for dizziness, focal weakness and headaches. Psychiatric/Behavioral: Negative for depression. The patient is not nervous/anxious. Health Maintenance Due   Topic Date Due    Bone Densitometry (Dexa) Screening  Never done    Eye Exam Retinal or Dilated  07/03/2019    Breast Cancer Screen Mammogram  06/12/2021   Dexa scan ordered  Eyes checked by Dr. Homar Anderson. We will obtain the report. Mammogram agreed to  [facility], prior positive screening    Cologuard test negative. Current Outpatient Medications   Medication Sig    glipiZIDE SR (GLUCOTROL XL) 10 mg CR tablet TAKE 2 TABLETS BY MOUTH IN THE MORNING    levothyroxine (SYNTHROID) 150 mcg tablet TAKE 1 TABLET BY MOUTH ONCE DAILY    benazepriL (LOTENSIN) 40 mg tablet Take 1 Tablet by mouth daily. Indications: high blood pressure    pravastatin (PRAVACHOL) 40 mg tablet Take 1 Tablet by mouth nightly.  insulin glargine (Lantus U-100 Insulin) 100 unit/mL injection 60 Units by SubCUTAneous route two (2) times a day. Indications: type 2 diabetes mellitus    chlorthalidone (HYGROTON) 25 mg tablet Take 1 Tablet by mouth daily for 360 days.  atenoloL (TENORMIN) 100 mg tablet 1 qd    amLODIPine (NORVASC) 10 mg tablet Take 1 Tablet by mouth daily.  SITagliptin (Januvia) 100 mg tablet Take 1 Tablet by mouth daily.  Blood-Glucose Meter monitoring kit Test daily as directed  Dx E11.9    glucose blood VI test strips (blood glucose test) strip Test daily as directed  Dx E11.9    naproxen sodium (ANAPROX) 550 mg tablet TAKE 1 TABLET BY MOUTH TWICE DAILY AS NEEDED FOR PAIN     No current facility-administered medications for this visit.      Allergies   Allergen Reactions    Metformin Nausea Only     has Gross hematuria, Recurrent UTI, Morbid obesity ([address] Incisional hernia, Esophagitis, unspecified, Generalized osteoarthrosis, involving multiple sites, Unspecified venous (peripheral) insufficiency, Hypothyroidism, Essential hypertension, benign, DM type 2 (diabetes mellitus, type 2) ([address] Fibrocystic change of breast, Mixed hyperlipidemia, Chronic renal disease, stage III, and Type 2 diabetes mellitus with chronic kidney disease ([address] on their problem list.    Past Surgical History:   Procedure Laterality Date    HX COLONOSCOPY  4/2005    (damle) neg    HX HERNIA REPAIR Right     abdominal hernia, right side    HX HYSTERECTOMY  1994    HX OTHER SURGICAL      pilonidal cyst excision x 2    GA BIOPSY OF BREAST, INCISIONAL  Jan 2012    right breast      reports that she quit smoking about 9 years ago. Her smoking use included cigarettes. She has never used smokeless tobacco. She reports current alcohol use. She reports that she does not use drugs. family history includes Cancer in her daughter and father; Heart Attack in her mother; Heart Disease in an other family member; Lung Disease in her mother; Prostate Cancer (age of onset: 80) in her father. Visit Vitals  BP (!) 160/68 (BP 1 Location: Right arm, BP Patient Position: Sitting, BP Cuff Size: Large adult)   Pulse (!) 55   Temp 97.7 °F (36.5 °C) (Temporal)   Resp 16   Ht 5' 3\" (1.6 m)   Wt 241 lb (109.3 kg)   SpO2 95%   BMI 42.69 kg/m²       Physical Exam  Vitals and nursing note reviewed. Constitutional:       Appearance: She is well-developed. HENT:      Head: Normocephalic and atraumatic. Right Ear: External ear normal.      Left Ear: Tympanic membrane and external ear normal.      Nose: Nose normal.      Mouth/Throat:      Mouth: Mucous membranes are dry. Eyes:      Conjunctiva/sclera: Conjunctivae normal.      Pupils: Pupils are equal, round, and reactive to light. Cardiovascular:      Rate and Rhythm: Normal rate and regular rhythm.       Heart sounds: Murmur (2/6 systolic murmur second right intercostal space) heard. Pulmonary:      Effort: Pulmonary effort is normal. No respiratory distress. Breath sounds: Normal breath sounds. No wheezing or rales. Chest:      Chest wall: No tenderness. Abdominal:      General: Bowel sounds are normal. There is distension (Right-sided distention. Correlate with her aforementioned hernia. She also has a midline scar below the umbilicus. Well-healed). Palpations: Abdomen is soft. There is no mass. Tenderness: There is no abdominal tenderness. There is no guarding or rebound. Musculoskeletal:         General: Normal range of motion. Cervical back: Normal range of motion and neck supple. Skin:     General: Skin is warm and dry. Findings: No erythema or rash. Neurological:      Mental Status: She is alert and oriented to person, place, and time. Psychiatric:         Mood and Affect: Mood normal.         Behavior: Behavior normal.         Judgment: Judgment normal.            Results for orders placed or performed during the hospital encounter of 05/23/22   TSH 3RD GENERATION   Result Value Ref Range    TSH 1.83 0.36 - 3.74 uIU/mL   CBC WITH AUTOMATED DIFF   Result Value Ref Range    WBC 12.8 4.6 - 13.2 K/uL    RBC 4.54 4.20 - 5.30 M/uL    HGB 13.2 12.0 - 16.0 g/dL    HCT 40.3 35.0 - 45.0 %    MCV 88.8 78.0 - 100.0 FL    MCH 29.1 24.0 - 34.0 PG    MCHC 32.8 31.0 - 37.0 g/dL    RDW 13.2 11.6 - 14.5 %    PLATELET 933 329 - 212 K/uL    MPV 9.5 9.2 - 11.8 FL    NRBC 0.0 0  WBC    ABSOLUTE NRBC 0.00 0.00 - 0.01 K/uL    NEUTROPHILS 68 40 - 73 %    LYMPHOCYTES 21 21 - 52 %    MONOCYTES 7 3 - 10 %    EOSINOPHILS 4 0 - 5 %    BASOPHILS 1 0 - 2 %    IMMATURE GRANULOCYTES 1 (H) 0.0 - 0.5 %    ABS. NEUTROPHILS 8.7 (H) 1.8 - 8.0 K/UL    ABS. LYMPHOCYTES 2.7 0.9 - 3.6 K/UL    ABS. MONOCYTES 0.8 0.05 - 1.2 K/UL    ABS. EOSINOPHILS 0.5 (H) 0.0 - 0.4 K/UL    ABS.  BASOPHILS 0.1 0.0 - 0.1 K/UL ABS. IMM. GRANS. 0.1 (H) 0.00 - 0.04 K/UL    DF AUTOMATED     METABOLIC PANEL, COMPREHENSIVE   Result Value Ref Range    Sodium 140 136 - 145 mmol/L    Potassium 4.8 3.5 - 5.5 mmol/L    Chloride 107 100 - 111 mmol/L    CO2 27 21 - 32 mmol/L    Anion gap 6 3.0 - 18 mmol/L    Glucose 109 (H) 74 - 99 mg/dL    BUN 24 (H) 7.0 - 18 MG/DL    Creatinine 1.04 0.6 - 1.3 MG/DL    BUN/Creatinine ratio 23 (H) 12 - 20      GFR est AA >60 >60 ml/min/1.73m2    GFR est non-AA 53 (L) >60 ml/min/1.73m2    Calcium 9.6 8.5 - 10.1 MG/DL    Bilirubin, total 0.5 0.2 - 1.0 MG/DL    ALT (SGPT) 14 13 - 56 U/L    AST (SGOT) 13 10 - 38 U/L    Alk. phosphatase 75 45 - 117 U/L    Protein, total 7.2 6.4 - 8.2 g/dL    Albumin 3.6 3.4 - 5.0 g/dL    Globulin 3.6 2.0 - 4.0 g/dL    A-G Ratio 1.0 0.8 - 1.7     LIPID PANEL   Result Value Ref Range    LIPID PROFILE          Cholesterol, total 151 <200 MG/DL    Triglyceride 171 (H) <150 MG/DL    HDL Cholesterol 41 40 - 60 MG/DL    LDL, calculated 75.8 0 - 100 MG/DL    VLDL, calculated 34.2 MG/DL    CHOL/HDL Ratio 3.7 0 - 5.0     HEMOGLOBIN A1C WITH EAG   Result Value Ref Range    Hemoglobin A1c 7.7 (H) 4.2 - 5.6 %    Est. average glucose 174 mg/dL   MICROALBUMIN, UR, RAND W/ MICROALB/CREAT RATIO   Result Value Ref Range    Microalbumin,urine random 17.10 (H) 0 - 3.0 MG/DL    Creatinine, urine 158.00 (H) 30 - 125 mg/dL    Microalbumin/Creat ratio (mg/g creat) 108 (H) 0 - 30 mg/g       We discussed the expected course, resolution and complications of the diagnosis(es) in detail. Medication risks, benefits, costs, interactions, and alternatives were discussed as indicated. I advised her to contact the office if her condition worsens, changes or fails to improve as anticipated. She expressed understanding with the diagnosis(es) and plan. This note was done with the assistance of dragon speech software.   Some inadvertent errors or omissions may be present

## 2022-05-31 ENCOUNTER — OFFICE VISIT (OUTPATIENT)
Dept: FAMILY MEDICINE CLINIC | Age: 67
End: 2022-05-31
Payer: MEDICARE

## 2022-05-31 VITALS
SYSTOLIC BLOOD PRESSURE: 160 MMHG | BODY MASS INDEX: 42.7 KG/M2 | DIASTOLIC BLOOD PRESSURE: 68 MMHG | RESPIRATION RATE: 16 BRPM | HEART RATE: 55 BPM | HEIGHT: 63 IN | OXYGEN SATURATION: 95 % | WEIGHT: 241 LBS | TEMPERATURE: 97.7 F

## 2022-05-31 DIAGNOSIS — Z12.31 ENCOUNTER FOR SCREENING MAMMOGRAM FOR MALIGNANT NEOPLASM OF BREAST: ICD-10-CM

## 2022-05-31 DIAGNOSIS — I10 ESSENTIAL HYPERTENSION, BENIGN: Primary | ICD-10-CM

## 2022-05-31 DIAGNOSIS — E78.2 MIXED HYPERLIPIDEMIA: ICD-10-CM

## 2022-05-31 DIAGNOSIS — E11.9 TYPE 2 DIABETES MELLITUS WITHOUT COMPLICATION, WITH LONG-TERM CURRENT USE OF INSULIN (HCC): ICD-10-CM

## 2022-05-31 DIAGNOSIS — N18.31 STAGE 3A CHRONIC KIDNEY DISEASE (HCC): ICD-10-CM

## 2022-05-31 DIAGNOSIS — E11.22 TYPE 2 DIABETES MELLITUS WITH CHRONIC KIDNEY DISEASE, WITHOUT LONG-TERM CURRENT USE OF INSULIN, UNSPECIFIED CKD STAGE (HCC): ICD-10-CM

## 2022-05-31 DIAGNOSIS — M94.9 DISORDER OF BONE AND CARTILAGE: ICD-10-CM

## 2022-05-31 DIAGNOSIS — Z79.4 TYPE 2 DIABETES MELLITUS WITHOUT COMPLICATION, WITH LONG-TERM CURRENT USE OF INSULIN (HCC): ICD-10-CM

## 2022-05-31 DIAGNOSIS — E03.9 ACQUIRED HYPOTHYROIDISM: ICD-10-CM

## 2022-05-31 DIAGNOSIS — M89.9 DISORDER OF BONE AND CARTILAGE: ICD-10-CM

## 2022-05-31 DIAGNOSIS — E66.01 OBESITY, CLASS III, BMI 40-49.9 (MORBID OBESITY) (HCC): ICD-10-CM

## 2022-05-31 PROCEDURE — G8536 NO DOC ELDER MAL SCRN: HCPCS | Performed by: EMERGENCY MEDICINE

## 2022-05-31 PROCEDURE — G8753 SYS BP > OR = 140: HCPCS | Performed by: EMERGENCY MEDICINE

## 2022-05-31 PROCEDURE — 3051F HG A1C>EQUAL 7.0%<8.0%: CPT | Performed by: EMERGENCY MEDICINE

## 2022-05-31 PROCEDURE — 1123F ACP DISCUSS/DSCN MKR DOCD: CPT | Performed by: EMERGENCY MEDICINE

## 2022-05-31 PROCEDURE — 99214 OFFICE O/P EST MOD 30 MIN: CPT | Performed by: EMERGENCY MEDICINE

## 2022-05-31 PROCEDURE — 1101F PT FALLS ASSESS-DOCD LE1/YR: CPT | Performed by: EMERGENCY MEDICINE

## 2022-05-31 PROCEDURE — G8510 SCR DEP NEG, NO PLAN REQD: HCPCS | Performed by: EMERGENCY MEDICINE

## 2022-05-31 PROCEDURE — G8417 CALC BMI ABV UP PARAM F/U: HCPCS | Performed by: EMERGENCY MEDICINE

## 2022-05-31 PROCEDURE — G8427 DOCREV CUR MEDS BY ELIG CLIN: HCPCS | Performed by: EMERGENCY MEDICINE

## 2022-05-31 PROCEDURE — G9899 SCRN MAM PERF RSLTS DOC: HCPCS | Performed by: EMERGENCY MEDICINE

## 2022-05-31 PROCEDURE — G8400 PT W/DXA NO RESULTS DOC: HCPCS | Performed by: EMERGENCY MEDICINE

## 2022-05-31 PROCEDURE — 3017F COLORECTAL CA SCREEN DOC REV: CPT | Performed by: EMERGENCY MEDICINE

## 2022-05-31 PROCEDURE — G8754 DIAS BP LESS 90: HCPCS | Performed by: EMERGENCY MEDICINE

## 2022-05-31 PROCEDURE — 2022F DILAT RTA XM EVC RTNOPTHY: CPT | Performed by: EMERGENCY MEDICINE

## 2022-05-31 PROCEDURE — 1090F PRES/ABSN URINE INCON ASSESS: CPT | Performed by: EMERGENCY MEDICINE

## 2022-05-31 RX ORDER — DOXAZOSIN 2 MG/1
2 TABLET ORAL DAILY
Qty: 90 TABLET | Refills: 3 | Status: SHIPPED | OUTPATIENT
Start: 2022-05-31 | End: 2022-10-04

## 2022-05-31 NOTE — PROGRESS NOTES
Liliana Lynn is a 75 y.o. female that is here for a   Chief Complaint   Patient presents with    Follow Up Chronic Condition         1. Have you been to the ER, urgent care clinic since your last visit? Hospitalized since your last visit?no    2. Have you seen or consulted any other health care providers outside of the [address] [place] [address] since your last visit? Include any pap smears or colon screening.  no      Health Maintenance reviewed - yes    Upcoming Appts  no      VORB: No orders of the defined types were placed in this encounter.   Cliff Martinez MD/ Denia Ford MA

## 2022-06-27 ENCOUNTER — TRANSCRIBE ORDER (OUTPATIENT)
Dept: SCHEDULING | Age: 67
End: 2022-06-27

## 2022-06-27 DIAGNOSIS — Z12.31 VISIT FOR SCREENING MAMMOGRAM: Primary | ICD-10-CM

## 2022-07-15 ENCOUNTER — HOSPITAL ENCOUNTER (OUTPATIENT)
Dept: MAMMOGRAPHY | Age: 67
Discharge: HOME | End: 2022-07-15
Attending: EMERGENCY MEDICINE
Payer: MEDICARE

## 2022-07-15 DIAGNOSIS — Z12.31 VISIT FOR SCREENING MAMMOGRAM: ICD-10-CM

## 2022-07-15 PROCEDURE — 77063 BREAST TOMOSYNTHESIS BI: CPT

## 2022-07-15 RX ORDER — AMLODIPINE BESYLATE 10 MG/1
TABLET ORAL
Qty: 60 TABLET | Refills: 0 | Status: SHIPPED | OUTPATIENT
Start: 2022-07-15 | End: 2022-07-23

## 2022-07-23 RX ORDER — AMLODIPINE BESYLATE 10 MG/1
TABLET ORAL
Qty: 60 TABLET | Refills: 0 | Status: SHIPPED | OUTPATIENT
Start: 2022-07-23

## 2022-08-08 DIAGNOSIS — R92.8 ABNORMAL MAMMOGRAM: Primary | ICD-10-CM

## 2022-08-18 ENCOUNTER — HOSPITAL ENCOUNTER (OUTPATIENT)
Dept: MAMMOGRAPHY | Age: 67
Discharge: HOME | End: 2022-08-18
Attending: EMERGENCY MEDICINE
Payer: MEDICARE

## 2022-08-18 DIAGNOSIS — R92.8 ABNORMAL MAMMOGRAM: ICD-10-CM

## 2022-08-18 PROCEDURE — 77066 DX MAMMO INCL CAD BI: CPT

## 2022-08-20 NOTE — PROGRESS NOTES
Please call. Just confirm that they told her that there was a suspicious area in the right breast that they wanted to get a piece of, biopsy. According to the note they are calling her to arrange this.   Let me know if there is anything we can do to assist.

## 2022-09-30 NOTE — PROGRESS NOTES
ICD-10-CM ICD-9-CM    1. Type 2 diabetes mellitus with stage 3a chronic kidney disease, with long-term current use of insulin ([facility])  E11.22 250.40     N18.31 585. 3     Z79.4 V58.67       2. Essential hypertension, benign  I10 401.1       3. Mixed hyperlipidemia  E78.2 272.2       4. Acquired hypothyroidism  E03.9 244.9       5. Obesity, Class III, BMI 40-49.9 (morbid obesity) ([facility])  E66.01 278.01       6. Stage 3a chronic kidney disease ([facility])  N18.31 585.3       7. Needs flu shot  Z23 V04.81 INFLUENZA, FLUAD, (AGE 72 Y+), IM, PF, 0.5 ML        Follow-up and Dispositions    Return in about 4 months (around 2/4/2023) for Follow up on illness, Labs/diagnostics/referrals ordered this visit. Assessment and plan  Essential hypertension  Added Cardura last visit. States it caused dyspnea. Will try at night. If not successful would try perhaps Catapres patch. Already on 4 other different classes of medication. Hyperlipidemia. Chronic problem well-controlled. Follow-up labs today  Acquired hypothyroidism, follow-up labs today  Unhealthy weight discussed diet and exercise discussed again. No meds for now. Having trouble getting her blood pressure down. Diabetes mellitus, if still elevated consider a GLP 1 agonist to help with blood pressure as well as diabetes. Nocturia 1 to 2 at night , follow  Degenerative joint disease. Using Aleve with good success. Does not want the DEXA scan done. She is arranging for the mammogram on her own. She is due to have an eye exam in September 2022. Lab results and schedule of future lab studies reviewed with patient. For labs today. Diagnostic and radiologic results and the schedule of future studies were reviewed with the patient  All questions answered and understood.   Health Maintenance Due   Topic Date Due    Shingrix Vaccine Age 49> (1 of 2) Never done    Eye Exam Retinal or Dilated  07/03/2019    COVID-19 Vaccine (3 - Booster for [place] series) 04/04/2022 Medicare Yearly Exam  11/03/2022     Arranging mammogram [facility]. Prior positive screening. Does not want DEXA  For eye check September. Dr. Emile Loera      Subjective:   Jonna Rivas is a 75 y.o. female has Gross hematuria, Recurrent UTI, Morbid obesity ([address] Incisional hernia, Esophagitis, unspecified, Generalized osteoarthrosis, involving multiple sites, Unspecified venous (peripheral) insufficiency, Hypothyroidism, Essential hypertension, benign, DM type 2 (diabetes mellitus, type 2) ([address] Fibrocystic change of breast, Mixed hyperlipidemia, Chronic renal disease, stage III, and Type 2 diabetes mellitus with chronic kidney disease ([address] on their problem list..  Chief Complaint   Patient presents with    Follow Up Chronic Condition          Seen previously by our practice on 5/31/2022  The primary encounter diagnosis was Needs flu shot. Diagnoses of Essential hypertension, benign, Mixed hyperlipidemia, Acquired hypothyroidism, Obesity, Class III, BMI 40-49.9 (morbid obesity) ([address] Type 2 diabetes mellitus without complication, with long-term current use of insulin ([address] Type 2 diabetes mellitus with chronic kidney disease, without long-term current use of insulin, unspecified CKD stage ([address] and Stage 3a chronic kidney disease ([address] were also pertinent to this visit. For breast biopsy 10/10/2022  For cataract surgery December. Seen previously by our practice on 2/23/2022  Labs 5/23/2022 CBC within normal limits. CMP glucose 109, hemoglobin A1c 7.7. No change. Microalbumin to creatinine ratio 108. TSH 1.83 improved from 7.58 last visit. Now in normal range. Creatinine 1.04, GFR estimated to be 53. Total cholesterol 151 triglycerides 171 LDL cholesterol 75.8. The CMP showed a glucose of 200 GFR approximately 52, albumin low at 3.3 and otherwise unremarkable. Hemoglobin A1c 7.7 with an average glucose of 174. Microalbumin to creatinine ratio 385, elevated.   Total cholesterol 138 LDL cholesterol 70.8. TSH 7.58. Elevated. Essential hypertension. Elevated reading today. We will not change as this is the first visit to us. Adjust meds if elevated on next visit. Type 2 diabetes mellitus, insulin requiring. Not controlled in May 2021 and not at target of less than 7. Tired Follow-up labs ordered. If elevated with new labs would adjust medication. Mixed hyperlipidemia. Chronic problem. Not controlled in May 2021. Follow-up labs ordered. If still elevated adjust meds. Acquired hypothyroidism. TSH elevated on last visit in May 2021. Repeat level ordered. If TSH elevated would increase the Synthroid. reviewed diet, exercise and weight control  Hypertension  The patient has no headaches, visual changes, chest pain or pressure,dyspnea, orthopnea, or PND. There is no problem with the medication. Rechecking on elevated blood pressure at this time. She has been taking the medication regularly. She had previously been on metoprolol but this caused a significant bradycardia. No history of secondary causes for hypertension. Diabetes mellitus  The patient denies polyuria, polydipsia, or polyphagia. The patient denies any other aggravating or relieving factors There has been no problem with the medications. She has not adhered to any specific diet. Hyperlipidemia   The patient denies any myalgias or weakness. No abdominal discomfort admitted to. The patient denies any difficulty with or side effects from the medication. BP Readings from Last 3 Encounters:   10/04/22 (!) 146/49   05/31/22 (!) 160/68   02/23/22 (!) 160/65     Please press want to confirm that you are ready to be connected with hel this is Dr. Gideon Navarro Ms. speak to Ms. Vaughn please all right very good okay to get your date of birth just for confirmation all right how can I help you today I can get the day will questioning what medications are you taking back left back so heavy had any fever or chills 0.2 and with C any discomfort anywhere in the nose or face he has about the left side of back of have you had this before I have I was then treated is a very good he have any other medical problems    Lab Results   Component Value Date/Time    Hemoglobin A1c 7.7 (H) 05/23/2022 01:09 PM     Lab Results   Component Value Date/Time    Cholesterol, total 151 05/23/2022 01:09 PM    HDL Cholesterol 41 05/23/2022 01:09 PM    LDL, calculated 75.8 05/23/2022 01:09 PM    VLDL, calculated 34.2 05/23/2022 01:09 PM    Triglyceride 171 (H) 05/23/2022 01:09 PM    CHOL/HDL Ratio 3.7 05/23/2022 01:09 PM       Hypothyroidism  The patient denies fatigue, cold intolerance, weight gain, constipation, dry skin, myalgia, constipation, joint aches or menstrual irregularities. There is no history of cognitive , dry skin hoarseness or edema. The patient has not noticed a puffy face, tongue enlargement, or loss of  hair or eyebrows. The patient has no problem with her medication. Lab Results   Component Value Date/Time    TSH 1.83 05/23/2022 01:09 PM       Lab Results   Component Value Date/Time    Sodium 140 05/23/2022 01:09 PM    Potassium 4.8 05/23/2022 01:09 PM    Chloride 107 05/23/2022 01:09 PM    CO2 27 05/23/2022 01:09 PM    Anion gap 6 05/23/2022 01:09 PM    Glucose 109 (H) 05/23/2022 01:09 PM    BUN 24 (H) 05/23/2022 01:09 PM    Creatinine 1.04 05/23/2022 01:09 PM    BUN/Creatinine ratio 23 (H) 05/23/2022 01:09 PM    GFR est AA >60 05/23/2022 01:09 PM    GFR est non-AA 53 (L) 05/23/2022 01:09 PM    Calcium 9.6 05/23/2022 01:09 PM     Lab Results   Component Value Date/Time    WBC 12.8 05/23/2022 01:09 PM    HGB 13.2 05/23/2022 01:09 PM    HCT 40.3 05/23/2022 01:09 PM    PLATELET 971 40/45/5950 01:09 PM    MCV 88.8 05/23/2022 01:09 PM     Smoker   75 y.o.. reports that she quit smoking about 9 years ago. Her smoking use included cigarettes. She has never used smokeless tobacco.  All told smoked 25 years or less, Intermittent smoker.  1 ppd aware of the side effects of smoking. She wants to us to recount the years she smoked which may be less than 20 but she is not sure. Does not want a CT scan ordered at this time. Aware of the risk benefits and alternatives. Review of Systems   Constitutional:  Negative for chills and fever. Cold intolerance   HENT:  Negative for hearing loss. Eyes:  Negative for blurred vision, discharge and redness. Cataracts both eyes   Respiratory:  Negative for cough and shortness of breath. Cardiovascular:  Positive for leg swelling. Negative for chest pain, palpitations and orthopnea. Cardiac murmur   Gastrointestinal:  Negative for abdominal pain, blood in stool, constipation, diarrhea and melena. Right-sided ventral hernia   Genitourinary:  Negative for dysuria, frequency and urgency. Musculoskeletal:  Negative for joint pain and myalgias. Skin:  Negative for rash (dishydrotic eczema). Neurological:  Negative for dizziness, focal weakness and headaches. Psychiatric/Behavioral:  Negative for depression. The patient is not nervous/anxious. Cologuard test negative. Current Outpatient Medications   Medication Sig    amLODIPine (NORVASC) 10 mg tablet Take 1 tablet by mouth once daily    glipiZIDE SR (GLUCOTROL XL) 10 mg CR tablet TAKE 2 TABLETS BY MOUTH IN THE MORNING    levothyroxine (SYNTHROID) 150 mcg tablet TAKE 1 TABLET BY MOUTH ONCE DAILY    benazepriL (LOTENSIN) 40 mg tablet Take 1 Tablet by mouth daily. Indications: high blood pressure    pravastatin (PRAVACHOL) 40 mg tablet Take 1 Tablet by mouth nightly. insulin glargine (Lantus U-100 Insulin) 100 unit/mL injection 60 Units by SubCUTAneous route two (2) times a day. Indications: type 2 diabetes mellitus    chlorthalidone (HYGROTON) 25 mg tablet Take 1 Tablet by mouth daily for 360 days. atenoloL (TENORMIN) 100 mg tablet 1 qd    SITagliptin (Januvia) 100 mg tablet Take 1 Tablet by mouth daily. Blood-Glucose Meter monitoring kit Test daily as directed  Dx E11.9    glucose blood VI test strips (blood glucose test) strip Test daily as directed  Dx E11.9    naproxen sodium (ANAPROX) 550 mg tablet TAKE 1 TABLET BY MOUTH TWICE DAILY AS NEEDED FOR PAIN     No current facility-administered medications for this visit. Allergies   Allergen Reactions    Metformin Nausea Only     has Gross hematuria, Recurrent UTI, Morbid obesity ([address] Incisional hernia, Esophagitis, unspecified, Generalized osteoarthrosis, involving multiple sites, Unspecified venous (peripheral) insufficiency, Hypothyroidism, Essential hypertension, benign, DM type 2 (diabetes mellitus, type 2) ([address] Fibrocystic change of breast, Mixed hyperlipidemia, Chronic renal disease, stage III, and Type 2 diabetes mellitus with chronic kidney disease ([facility] on their problem list.    Past Surgical History:   Procedure Laterality Date    HX COLONOSCOPY  4/2005    (damle) neg    HX HERNIA REPAIR Right     abdominal hernia, right side    HX HYSTERECTOMY  1994    HX OTHER SURGICAL      pilonidal cyst excision x 2    MS BIOPSY OF BREAST, INCISIONAL  Jan 2012    right breast      reports that she quit smoking about 9 years ago. Her smoking use included cigarettes. She has never used smokeless tobacco. She reports current alcohol use. She reports that she does not use drugs. family history includes Cancer in her daughter and father; Heart Attack in her mother; Heart Disease in an other family member; Lung Disease in her mother; Prostate Cancer (age of onset: 80) in her father. Visit Vitals  BP (!) 146/49 (BP 1 Location: Left arm, BP Patient Position: Sitting, BP Cuff Size: Large adult)   Pulse (!) 48   Resp 16   Ht 5' 3\" (1.6 m)   Wt 243 lb (110.2 kg)   SpO2 95%   BMI 43.05 kg/m²       Physical Exam  Vitals and nursing note reviewed. Constitutional:       Appearance: She is well-developed. HENT:      Head: Normocephalic and atraumatic.       Right Ear: External ear normal.      Left Ear: Tympanic membrane and external ear normal.      Nose: Nose normal.      Mouth/Throat:      Mouth: Mucous membranes are dry. Eyes:      Conjunctiva/sclera: Conjunctivae normal.      Pupils: Pupils are equal, round, and reactive to light. Cardiovascular:      Rate and Rhythm: Normal rate and regular rhythm. Heart sounds: Murmur (2/6 systolic murmur second right intercostal space) heard. Pulmonary:      Effort: Pulmonary effort is normal. No respiratory distress. Breath sounds: Normal breath sounds. No wheezing or rales. Chest:      Chest wall: No tenderness. Abdominal:      General: Bowel sounds are normal. There is distension (Right-sided distention. Correlate with her aforementioned hernia. She also has a midline scar below the umbilicus. Well-healed). Palpations: Abdomen is soft. There is no mass. Tenderness: There is no abdominal tenderness. There is no guarding or rebound. Musculoskeletal:         General: Normal range of motion. Cervical back: Normal range of motion and neck supple. Skin:     General: Skin is warm and dry. Findings: No erythema or rash. Neurological:      Mental Status: She is alert and oriented to person, place, and time.    Psychiatric:         Mood and Affect: Mood normal.         Behavior: Behavior normal.         Judgment: Judgment normal.          Results for orders placed or performed during the hospital encounter of 05/23/22   TSH 3RD GENERATION   Result Value Ref Range    TSH 1.83 0.36 - 3.74 uIU/mL   CBC WITH AUTOMATED DIFF   Result Value Ref Range    WBC 12.8 4.6 - 13.2 K/uL    RBC 4.54 4.20 - 5.30 M/uL    HGB 13.2 12.0 - 16.0 g/dL    HCT 40.3 35.0 - 45.0 %    MCV 88.8 78.0 - 100.0 FL    MCH 29.1 24.0 - 34.0 PG    MCHC 32.8 31.0 - 37.0 g/dL    RDW 13.2 11.6 - 14.5 %    PLATELET 913 660 - 053 K/uL    MPV 9.5 9.2 - 11.8 FL    NRBC 0.0 0  WBC    ABSOLUTE NRBC 0.00 0.00 - 0.01 K/uL    NEUTROPHILS 68 40 - 73 %    LYMPHOCYTES 21 21 - 52 %    MONOCYTES 7 3 - 10 %    EOSINOPHILS 4 0 - 5 %    BASOPHILS 1 0 - 2 %    IMMATURE GRANULOCYTES 1 (H) 0.0 - 0.5 %    ABS. NEUTROPHILS 8.7 (H) 1.8 - 8.0 K/UL    ABS. LYMPHOCYTES 2.7 0.9 - 3.6 K/UL    ABS. MONOCYTES 0.8 0.05 - 1.2 K/UL    ABS. EOSINOPHILS 0.5 (H) 0.0 - 0.4 K/UL    ABS. BASOPHILS 0.1 0.0 - 0.1 K/UL    ABS. IMM. GRANS. 0.1 (H) 0.00 - 0.04 K/UL    DF AUTOMATED     METABOLIC PANEL, COMPREHENSIVE   Result Value Ref Range    Sodium 140 136 - 145 mmol/L    Potassium 4.8 3.5 - 5.5 mmol/L    Chloride 107 100 - 111 mmol/L    CO2 27 21 - 32 mmol/L    Anion gap 6 3.0 - 18 mmol/L    Glucose 109 (H) 74 - 99 mg/dL    BUN 24 (H) 7.0 - 18 MG/DL    Creatinine 1.04 0.6 - 1.3 MG/DL    BUN/Creatinine ratio 23 (H) 12 - 20      GFR est AA >60 >60 ml/min/1.73m2    GFR est non-AA 53 (L) >60 ml/min/1.73m2    Calcium 9.6 8.5 - 10.1 MG/DL    Bilirubin, total 0.5 0.2 - 1.0 MG/DL    ALT (SGPT) 14 13 - 56 U/L    AST (SGOT) 13 10 - 38 U/L    Alk. phosphatase 75 45 - 117 U/L    Protein, total 7.2 6.4 - 8.2 g/dL    Albumin 3.6 3.4 - 5.0 g/dL    Globulin 3.6 2.0 - 4.0 g/dL    A-G Ratio 1.0 0.8 - 1.7     LIPID PANEL   Result Value Ref Range    LIPID PROFILE          Cholesterol, total 151 <200 MG/DL    Triglyceride 171 (H) <150 MG/DL    HDL Cholesterol 41 40 - 60 MG/DL    LDL, calculated 75.8 0 - 100 MG/DL    VLDL, calculated 34.2 MG/DL    CHOL/HDL Ratio 3.7 0 - 5.0     HEMOGLOBIN A1C WITH EAG   Result Value Ref Range    Hemoglobin A1c 7.7 (H) 4.2 - 5.6 %    Est. average glucose 174 mg/dL   MICROALBUMIN, UR, RAND W/ MICROALB/CREAT RATIO   Result Value Ref Range    Microalbumin,urine random 17.10 (H) 0 - 3.0 MG/DL    Creatinine, urine 158.00 (H) 30 - 125 mg/dL    Microalbumin/Creat ratio (mg/g creat) 108 (H) 0 - 30 mg/g       We discussed the expected course, resolution and complications of the diagnosis(es) in detail.   Medication risks, benefits, costs, interactions, and alternatives were discussed as indicated. I advised her to contact the office if her condition worsens, changes or fails to improve as anticipated. She expressed understanding with the diagnosis(es) and plan. This note was done with the assistance of dragon speech software.   Some inadvertent errors or omissions may be present

## 2022-10-04 ENCOUNTER — OFFICE VISIT (OUTPATIENT)
Dept: FAMILY MEDICINE CLINIC | Age: 67
End: 2022-10-04
Payer: MEDICARE

## 2022-10-04 ENCOUNTER — HOSPITAL ENCOUNTER (OUTPATIENT)
Dept: LAB | Age: 67
Discharge: HOME | End: 2022-10-04
Payer: MEDICARE

## 2022-10-04 VITALS
WEIGHT: 243 LBS | HEIGHT: 63 IN | RESPIRATION RATE: 16 BRPM | DIASTOLIC BLOOD PRESSURE: 49 MMHG | SYSTOLIC BLOOD PRESSURE: 146 MMHG | OXYGEN SATURATION: 95 % | BODY MASS INDEX: 43.05 KG/M2 | HEART RATE: 48 BPM

## 2022-10-04 DIAGNOSIS — E66.01 OBESITY, CLASS III, BMI 40-49.9 (MORBID OBESITY) (HCC): ICD-10-CM

## 2022-10-04 DIAGNOSIS — E11.22 TYPE 2 DIABETES MELLITUS WITH STAGE 3A CHRONIC KIDNEY DISEASE, WITH LONG-TERM CURRENT USE OF INSULIN (HCC): Primary | ICD-10-CM

## 2022-10-04 DIAGNOSIS — E78.2 MIXED HYPERLIPIDEMIA: ICD-10-CM

## 2022-10-04 DIAGNOSIS — Z23 NEEDS FLU SHOT: ICD-10-CM

## 2022-10-04 DIAGNOSIS — N18.31 TYPE 2 DIABETES MELLITUS WITH STAGE 3A CHRONIC KIDNEY DISEASE, WITH LONG-TERM CURRENT USE OF INSULIN (HCC): Primary | ICD-10-CM

## 2022-10-04 DIAGNOSIS — E03.9 ACQUIRED HYPOTHYROIDISM: ICD-10-CM

## 2022-10-04 DIAGNOSIS — N18.31 STAGE 3A CHRONIC KIDNEY DISEASE (HCC): ICD-10-CM

## 2022-10-04 DIAGNOSIS — Z79.4 TYPE 2 DIABETES MELLITUS WITH STAGE 3A CHRONIC KIDNEY DISEASE, WITH LONG-TERM CURRENT USE OF INSULIN (HCC): Primary | ICD-10-CM

## 2022-10-04 DIAGNOSIS — I10 ESSENTIAL HYPERTENSION, BENIGN: ICD-10-CM

## 2022-10-04 LAB
ANION GAP SERPL CALC-SCNC: 5 MMOL/L (ref 3–18)
BASOPHILS # BLD: 0.1 K/UL (ref 0–0.1)
BASOPHILS NFR BLD: 1 % (ref 0–2)
BUN SERPL-MCNC: 23 MG/DL (ref 7–18)
BUN/CREAT SERPL: 21 (ref 12–20)
CALCIUM SERPL-MCNC: 9 MG/DL (ref 8.5–10.1)
CHLORIDE SERPL-SCNC: 106 MMOL/L (ref 100–111)
CHOLEST SERPL-MCNC: 126 MG/DL
CO2 SERPL-SCNC: 29 MMOL/L (ref 21–32)
CREAT SERPL-MCNC: 1.08 MG/DL (ref 0.6–1.3)
CREAT UR-MCNC: 112 MG/DL (ref 30–125)
DIFFERENTIAL METHOD BLD: ABNORMAL
EOSINOPHIL # BLD: 0.3 K/UL (ref 0–0.4)
EOSINOPHIL NFR BLD: 3 % (ref 0–5)
ERYTHROCYTE [DISTWIDTH] IN BLOOD BY AUTOMATED COUNT: 13.9 % (ref 11.6–14.5)
EST. AVERAGE GLUCOSE BLD GHB EST-MCNC: 151 MG/DL
GLUCOSE SERPL-MCNC: 137 MG/DL (ref 74–99)
HBA1C MFR BLD: 6.9 % (ref 4.2–5.6)
HCT VFR BLD AUTO: 37.9 % (ref 35–45)
HDLC SERPL-MCNC: 37 MG/DL (ref 40–60)
HDLC SERPL: 3.4 {RATIO} (ref 0–5)
HGB BLD-MCNC: 12.6 G/DL (ref 12–16)
IMM GRANULOCYTES # BLD AUTO: 0 K/UL (ref 0–0.04)
IMM GRANULOCYTES NFR BLD AUTO: 0 % (ref 0–0.5)
LDLC SERPL CALC-MCNC: 59.6 MG/DL (ref 0–100)
LIPID PROFILE,FLP: ABNORMAL
LYMPHOCYTES # BLD: 1.8 K/UL (ref 0.9–3.6)
LYMPHOCYTES NFR BLD: 16 % (ref 21–52)
MCH RBC QN AUTO: 29.9 PG (ref 24–34)
MCHC RBC AUTO-ENTMCNC: 33.2 G/DL (ref 31–37)
MCV RBC AUTO: 89.8 FL (ref 78–100)
MICROALBUMIN UR-MCNC: 8.24 MG/DL (ref 0–3)
MICROALBUMIN/CREAT UR-RTO: 74 MG/G (ref 0–30)
MONOCYTES # BLD: 0.6 K/UL (ref 0.05–1.2)
MONOCYTES NFR BLD: 5 % (ref 3–10)
NEUTS SEG # BLD: 8.4 K/UL (ref 1.8–8)
NEUTS SEG NFR BLD: 75 % (ref 40–73)
NRBC # BLD: 0 K/UL (ref 0–0.01)
NRBC BLD-RTO: 0 PER 100 WBC
PLATELET # BLD AUTO: 328 K/UL (ref 135–420)
PMV BLD AUTO: 10 FL (ref 9.2–11.8)
POTASSIUM SERPL-SCNC: 4.5 MMOL/L (ref 3.5–5.5)
RBC # BLD AUTO: 4.22 M/UL (ref 4.2–5.3)
SODIUM SERPL-SCNC: 140 MMOL/L (ref 136–145)
TRIGL SERPL-MCNC: 147 MG/DL (ref ?–150)
TSH SERPL DL<=0.05 MIU/L-ACNC: 13.3 UIU/ML (ref 0.36–3.74)
VLDLC SERPL CALC-MCNC: 29.4 MG/DL
WBC # BLD AUTO: 11.2 K/UL (ref 4.6–13.2)

## 2022-10-04 PROCEDURE — 1101F PT FALLS ASSESS-DOCD LE1/YR: CPT | Performed by: EMERGENCY MEDICINE

## 2022-10-04 PROCEDURE — G8753 SYS BP > OR = 140: HCPCS | Performed by: EMERGENCY MEDICINE

## 2022-10-04 PROCEDURE — 82043 UR ALBUMIN QUANTITATIVE: CPT

## 2022-10-04 PROCEDURE — 83036 HEMOGLOBIN GLYCOSYLATED A1C: CPT

## 2022-10-04 PROCEDURE — 80048 BASIC METABOLIC PNL TOTAL CA: CPT

## 2022-10-04 PROCEDURE — 3017F COLORECTAL CA SCREEN DOC REV: CPT | Performed by: EMERGENCY MEDICINE

## 2022-10-04 PROCEDURE — 1123F ACP DISCUSS/DSCN MKR DOCD: CPT | Performed by: EMERGENCY MEDICINE

## 2022-10-04 PROCEDURE — G8754 DIAS BP LESS 90: HCPCS | Performed by: EMERGENCY MEDICINE

## 2022-10-04 PROCEDURE — G8427 DOCREV CUR MEDS BY ELIG CLIN: HCPCS | Performed by: EMERGENCY MEDICINE

## 2022-10-04 PROCEDURE — 85025 COMPLETE CBC W/AUTO DIFF WBC: CPT

## 2022-10-04 PROCEDURE — 99214 OFFICE O/P EST MOD 30 MIN: CPT | Performed by: EMERGENCY MEDICINE

## 2022-10-04 PROCEDURE — 3051F HG A1C>EQUAL 7.0%<8.0%: CPT | Performed by: EMERGENCY MEDICINE

## 2022-10-04 PROCEDURE — 1090F PRES/ABSN URINE INCON ASSESS: CPT | Performed by: EMERGENCY MEDICINE

## 2022-10-04 PROCEDURE — 80061 LIPID PANEL: CPT

## 2022-10-04 PROCEDURE — G8536 NO DOC ELDER MAL SCRN: HCPCS | Performed by: EMERGENCY MEDICINE

## 2022-10-04 PROCEDURE — 84443 ASSAY THYROID STIM HORMONE: CPT

## 2022-10-04 PROCEDURE — G8400 PT W/DXA NO RESULTS DOC: HCPCS | Performed by: EMERGENCY MEDICINE

## 2022-10-04 PROCEDURE — 2022F DILAT RTA XM EVC RTNOPTHY: CPT | Performed by: EMERGENCY MEDICINE

## 2022-10-04 PROCEDURE — G8510 SCR DEP NEG, NO PLAN REQD: HCPCS | Performed by: EMERGENCY MEDICINE

## 2022-10-04 PROCEDURE — G8417 CALC BMI ABV UP PARAM F/U: HCPCS | Performed by: EMERGENCY MEDICINE

## 2022-10-04 PROCEDURE — G0008 ADMIN INFLUENZA VIRUS VAC: HCPCS | Performed by: EMERGENCY MEDICINE

## 2022-10-04 PROCEDURE — 90694 VACC AIIV4 NO PRSRV 0.5ML IM: CPT | Performed by: EMERGENCY MEDICINE

## 2022-10-04 PROCEDURE — G9899 SCRN MAM PERF RSLTS DOC: HCPCS | Performed by: EMERGENCY MEDICINE

## 2022-10-04 PROCEDURE — 36415 COLL VENOUS BLD VENIPUNCTURE: CPT

## 2022-10-04 NOTE — PATIENT INSTRUCTIONS
Hypothyroidism: Care Instructions  Your Care Instructions     When you have hypothyroidism, your body doesn't make enough thyroid hormone. This hormone helps your body use energy. If your thyroid level is low, you may feel tired, be constipated, have an increase in your blood pressure, or have dry skin or memory problems. You may also get cold easily, even when it is warm. Women with low thyroid levels may have heavy menstrual periods. A blood test to find your thyroid-stimulating hormone (TSH) level is used to check for hypothyroidism. A high TSH level may mean that you have it. The treatment for hypothyroidism is thyroid hormone pills. You should start to feel better in 1 to 2 weeks. Most people need treatment for the rest of their lives. You will need regular visits with your doctor to make sure you are doing well and that you have the right dose of medicine. Follow-up care is a key part of your treatment and safety. Be sure to make and go to all appointments, and call your doctor if you are having problems. It's also a good idea to know your test results and keep a list of the medicines you take. How can you care for yourself at home? Take your thyroid hormone medicine exactly as prescribed. Call your doctor if you think you are having a problem with your medicine. Most people do not have side effects if they take the right amount of medicine regularly. Take the medicine 30 minutes before breakfast, and do not take it with calcium, vitamins, or iron. Do not take extra doses of your thyroid medicine. It will not help you get better any faster, and it may cause side effects. If you forget to take a dose, do NOT take a double dose of medicine. Take your usual dose the next day. Tell your doctor about all prescription, herbal, or over-the-counter products you take. Take care of yourself. Eat a healthy diet, get enough sleep, and get regular exercise. When should you call for help?    Call 911 anytime you think you may need emergency care. For example, call if:    You passed out (lost consciousness). You have severe trouble breathing. You have a very slow heartbeat (less than 60 beats a minute). You have a low body temperature (95°F or below). Call your doctor now or seek immediate medical care if:    You feel tired, sluggish, or weak. You have trouble remembering things or concentrating. You do not begin to feel better 2 weeks after starting your medicine. Watch closely for changes in your health, and be sure to contact your doctor if you have any problems. Where can you learn more? Go to http://www.gray.com/  Enter B260 in the search box to learn more about \"Hypothyroidism: Care Instructions. \"  Current as of: July 28, 2021               Content Version: 13.2  © 2006-2022 [email or web address]. Care instructions adapted under license by [employer] (which disclaims liability or warranty for this information). If you have questions about a medical condition or this instruction, always ask your healthcare professional. [place] [address] any warranty or liability for your use of this information. Hyperlipidemia: After Your Visit  Your Care Instructions  Hyperlipidemia is too much fat in your blood. The body has several kinds of fat, including cholesterol and triglycerides. Your body needs fat for many things, such as making new cells. But too much fat in your blood increases your chances of having a heart attack or stroke. You may be able to lower your cholesterol and triglycerides with a heart-healthy diet, exercise, and if needed, medicine. Your doctor may want you to try lifestyle changes first to see whether they lower the fat in your blood. You may need to take medicine if lifestyle changes do not lower the fat in your blood enough. Follow-up care is a key part of your treatment and safety.  Be sure to make and go to all appointments, and call your doctor if you are having problems. Its also a good idea to know your test results and keep a list of the medicines you take. How can you care for yourself at home? Take your medicines  Take your medicines exactly as prescribed. Call your doctor if you think you are having a problem with your medicine. If you take medicine to lower your cholesterol, go to follow-up visits. You will need to have blood tests. Do not take large doses of niacin, which is a B vitamin, while taking medicine called statins. It may increase the chance of muscle pain and liver problems. Talk to your doctor about avoiding grapefruit juice if you are taking statins. Grapefruit juice can raise the level of this medicine in your blood. This could increase side effects. Eat more fruits, vegetables, and fiber  Fruits and vegetables have lots of nutrients that help protect against heart disease, and they have little--if any--fat. Try to eat at least five servings a day. Dark green, deep orange, or yellow fruits and vegetables are healthy choices. Keep carrots, celery, and other veggies handy for snacks. Buy fruit that is in season and store it where you can see it so that you will be tempted to eat it. Cook dishes that have a lot of veggies in them, such as stir-fries and soups. Foods high in fiber may reduce your cholesterol and provide important vitamins and minerals. High-fiber foods include whole-grain cereals and breads, oatmeal, beans, brown rice, citrus fruits, and apples. Buy whole-grain breads and cereals instead of white bread and pastries. Limit saturated fat  Read food labels and try to avoid saturated fat and trans fat. They increase your risk of heart disease. Use olive or canola oil when you cook. Try cholesterol-lowering spreads, such as Benecol or Take Control. Bake, broil, grill, or steam foods instead of frying them.   Limit the amount of high-fat meats you eat, including hot dogs and sausages. Cut out all visible fat when you prepare meat. Eat fish, skinless poultry, and soy products such as tofu instead of high-fat meats. Soybeans may be especially good for your heart. Eat at least two servings of fish a week. Certain fish, such as salmon, contain omega-3 fatty acids, which may help reduce your risk of heart attack. Choose low-fat or fat-free milk and dairy products. Get exercise, limit alcohol, and quit smoking  Get more exercise. Work with your doctor to set up an exercise program. Even if you can do only a small amount, exercise will help you get stronger, have more energy, and manage your weight and your stress. Walking is an easy way to get exercise. Gradually increase the amount you walk every day. Aim for at least 30 minutes on most days of the week. You also may want to swim, bike, or do other activities. Limit alcohol to no more than 2 drinks a day for men and 1 drink a day for women. Do not smoke. If you need help quitting, talk to your doctor about stop-smoking programs and medicines. These can increase your chances of quitting for good. When should you call for help? Call 911 anytime you think you may need emergency care. For example, call if:  You have symptoms of a heart attack. These may include:  Chest pain or pressure, or a strange feeling in the chest.  Sweating. Shortness of breath. Nausea or vomiting. Pain, pressure, or a strange feeling in the back, neck, jaw, or upper belly or in one or both shoulders or arms. Lightheadedness or sudden weakness. A fast or irregular heartbeat. After you call 911, the  may tell you to chew 1 adult-strength or 2 to 4 low-dose aspirin. Wait for an ambulance. Do not try to drive yourself. You have signs of a stroke. These may include:  Sudden numbness, paralysis, or weakness in your face, arm, or leg, especially on only one side of your body. New problems with walking or balance. Sudden vision changes.   Drooling or slurred speech. New problems speaking or understanding simple statements, or feeling confused. A sudden, severe headache that is different from past headaches. You passed out (lost consciousness). Call your doctor now or seek immediate medical care if:  You have muscle pain or weakness. Watch closely for changes in your health, and be sure to contact your doctor if:  You are very tired. You have an upset stomach, gas, constipation, or belly pain or cramps. Where can you learn more? Go to [email or web address]  Enter C406 in the search box to learn more about \"Hyperlipidemia: After Your Visit. \"   © 2858-0202 Healthwise[employer]. Care instructions adapted under license by [employer] (which disclaims liability or warranty for this information). This care instruction is for use with your licensed healthcare professional. If you have questions about a medical condition or this instruction, always ask your healthcare professional. [place] [address] any warranty or liability for your use of this information. Content Version: 0.4.409319; Last Revised: October 13, 2011                 Type 2 Diabetes: Care Instructions  Your Care Instructions     Type 2 diabetes is a disease that develops when the body's tissues cannot use insulin properly. Over time, the pancreas cannot make enough insulin. Insulin is a hormone that helps the body's cells use sugar (glucose) for energy. It also helps the body store extra sugar in muscle, fat, and liver cells. Without insulin, the sugar cannot get into the cells to do its work. It stays in the blood instead. This can cause high blood sugar levels. A person has diabetes when the blood sugar stays too high too much of the time. Over time, diabetes can lead to diseases of the heart, blood vessels, nerves, kidneys, and eyes. You may be able to control your blood sugar by losing weight, eating a healthy diet, and getting daily exercise.  You may also have to take insulin or other diabetes medicine. Follow-up care is a key part of your treatment and safety. Be sure to make and go to all appointments. Call your doctor if you are having problems. It's also a good idea to know your test results and keep a list of the medicines you take. How can you care for yourself at home? Keep your blood sugar at a target level (which you set with your doctor). Carbohydrate--the body's main source of fuel--affects blood sugar more than any other nutrient. Carbohydrate is in fruits, vegetables, milk, and yogurt. It also is in breads, cereals, vegetables such as potatoes and corn, and sugary foods such as candy and cakes. Follow your meal plan to know how much carbohydrate to eat at each meal and snack. Aim for 30 minutes of exercise on most, preferably all, days of the week. Walking is a good choice. You also may want to do other activities, such as running, swimming, cycling, or playing tennis or team sports. Try to do muscle-strengthening exercises at least 2 times a week. Take your medicines exactly as prescribed. Call your doctor if you think you are having a problem with your medicine. You will get more details on the specific medicines your doctor prescribes. Check your blood sugar as often as your doctor recommends. It is important to keep track of any symptoms you have, such as low blood sugar. Also tell your doctor if you have any changes in your activities, diet, or insulin use. Talk to your doctor before you start taking aspirin every day. Aspirin can help certain people lower their risk of a heart attack or stroke. But taking aspirin isn't right for everyone, because it can cause serious bleeding. Do not smoke. If you need help quitting, talk to your doctor about stop-smoking programs and medicines. These can increase your chances of quitting for good. Keep your cholesterol and blood pressure at normal levels.  You may need to take one or more medicines to reach your goals. Take them exactly as directed. Do not stop or change a medicine without talking to your doctor first.  When should you call for help? Call 911 anytime you think you may need emergency care. For example, call if:    You passed out (lost consciousness), or you suddenly become very sleepy or confused. (You may have very low blood sugar.)   Call your doctor now or seek immediate medical care if:    Your blood sugar is 300 mg/dL or is higher than the level your doctor has set for you. You have symptoms of low blood sugar, such as:  Sweating. Feeling nervous, shaky, and weak. Extreme hunger and slight nausea. Dizziness and headache. Blurred vision. Confusion. Watch closely for changes in your health, and be sure to contact your doctor if:    You often have problems controlling your blood sugar. You have symptoms of long-term diabetes problems, such as:  New vision changes. New pain, numbness, or tingling in your hands or feet. Skin problems. Where can you learn more? Go to http://www.gray.com/  Enter C553 in the search box to learn more about \"Type 2 Diabetes: Care Instructions. \"  Current as of: August 31, 2020               Content Version: 12.8  © 2006-2021 [employer]. Care instructions adapted under license by [employer] (which disclaims liability or warranty for this information). If you have questions about a medical condition or this instruction, always ask your healthcare professional. [place] [address] any warranty or liability for your use of this information. High Blood Pressure: Care Instructions  Overview     It's normal for blood pressure to go up and down throughout the day. But if it stays up, you have high blood pressure. Another name for high blood pressure is hypertension.   Despite what a lot of people think, high blood pressure usually doesn't cause headaches or make you feel dizzy or lightheaded. It usually has no symptoms. But it does increase your risk of stroke, heart attack, and other problems. You and your doctor will talk about your risks of these problems based on your blood pressure. Your doctor will give you a goal for your blood pressure. Your goal will be based on your health and your age. Lifestyle changes, such as eating healthy and being active, are always important to help lower blood pressure. You might also take medicine to reach your blood pressure goal.  Follow-up care is a key part of your treatment and safety. Be sure to make and go to all appointments, and call your doctor if you are having problems. It's also a good idea to know your test results and keep a list of the medicines you take. How can you care for yourself at home? Medical treatment  If you stop taking your medicine, your blood pressure will go back up. You may take one or more types of medicine to lower your blood pressure. Be safe with medicines. Take your medicine exactly as prescribed. Call your doctor if you think you are having a problem with your medicine. Talk to your doctor before you start taking aspirin every day. Aspirin can help certain people lower their risk of a heart attack or stroke. But taking aspirin isn't right for everyone, because it can cause serious bleeding. See your doctor regularly. You may need to see the doctor more often at first or until your blood pressure comes down. If you are taking blood pressure medicine, talk to your doctor before you take decongestants or anti-inflammatory medicine, such as ibuprofen. Some of these medicines can raise blood pressure. Learn how to check your blood pressure at home. Lifestyle changes  Stay at a healthy weight. This is especially important if you put on weight around the waist. Losing even 10 pounds can help you lower your blood pressure. If your doctor recommends it, get more exercise. Walking is a good choice.  Bit by bit, increase the amount you walk every day. Try for at least 30 minutes on most days of the week. You also may want to swim, bike, or do other activities. Avoid or limit alcohol. Talk to your doctor about whether you can drink any alcohol. Try to limit how much sodium you eat to less than 2,300 milligrams (mg) a day. Your doctor may ask you to try to eat less than 1,500 mg a day. Eat plenty of fruits (such as bananas and oranges), vegetables, legumes, whole grains, and low-fat dairy products. Lower the amount of saturated fat in your diet. Saturated fat is found in animal products such as milk, cheese, and meat. Limiting these foods may help you lose weight and also lower your risk for heart disease. Do not smoke. Smoking increases your risk for heart attack and stroke. If you need help quitting, talk to your doctor about stop-smoking programs and medicines. These can increase your chances of quitting for good. When should you call for help? Call  911 anytime you think you may need emergency care. This may mean having symptoms that suggest that your blood pressure is causing a serious heart or blood vessel problem. Your blood pressure may be over 180/120. For example, call 911 if:    You have symptoms of a heart attack. These may include:  Chest pain or pressure, or a strange feeling in the chest.  Sweating. Shortness of breath. Nausea or vomiting. Pain, pressure, or a strange feeling in the back, neck, jaw, or upper belly or in one or both shoulders or arms. Lightheadedness or sudden weakness. A fast or irregular heartbeat. You have symptoms of a stroke. These may include:  Sudden numbness, tingling, weakness, or loss of movement in your face, arm, or leg, especially on only one side of your body. Sudden vision changes. Sudden trouble speaking. Sudden confusion or trouble understanding simple statements. Sudden problems with walking or balance.   A sudden, severe headache that is different from past headaches. You have severe back or belly pain. Do not wait until your blood pressure comes down on its own. Get help right away. Call your doctor now or seek immediate care if:    Your blood pressure is much higher than normal (such as 180/120 or higher), but you don't have symptoms. You think high blood pressure is causing symptoms, such as:  Severe headache. Blurry vision. Watch closely for changes in your health, and be sure to contact your doctor if:    Your blood pressure measures higher than your doctor recommends at least 2 times. That means the top number is higher or the bottom number is higher, or both. You think you may be having side effects from your blood pressure medicine. Where can you learn more? Go to http://www.gray.com/  Enter [id number] in the search box to learn more about \"High Blood Pressure: Care Instructions. \"  Current as of: August 31, 2020               Content Version: 12.8  © 6246-6377 [employer]. Care instructions adapted under license by [employer] (which disclaims liability or warranty for this information). If you have questions about a medical condition or this instruction, always ask your healthcare professional. [place] [address] any warranty or liability for your use of this information. Learning About the [address] Diet  What is the Mediterranean diet? The Mediterranean diet is a style of eating rather than a diet plan. It features foods eaten in [place], [place], [place], and other countries along the [facility]. It emphasizes eating foods like fish, fruits, vegetables, beans, high-fiber breads and whole grains, nuts, and olive oil. This style of eating includes limited red meat, cheese, and sweets. Why choose the Mediterranean diet? A Mediterranean-style diet may improve heart health. It contains more fat than other heart-healthy diets.  But the fats are mainly from nuts, unsaturated oils (such as fish oils and olive oil), and certain nut or seed oils (such as canola, soybean, or flaxseed oil). These fats may help protect the heart and blood vessels. How can you get started on the Mediterranean diet? Here are some things you can do to switch to a more Mediterranean way of eating. What to eat  Eat a variety of fruits and vegetables each day, such as grapes, blueberries, tomatoes, broccoli, peppers, figs, olives, spinach, eggplant, beans, lentils, and chickpeas. Eat a variety of whole-grain foods each day, such as oats, brown rice, and whole wheat bread, pasta, and couscous. Eat fish at least 2 times a week. Try tuna, salmon, mackerel, lake trout, herring, or sardines. Eat moderate amounts of low-fat dairy products, such as milk, cheese, or yogurt. Eat moderate amounts of poultry and eggs. Choose healthy (unsaturated) fats, such as nuts, olive oil, and certain nut or seed oils like canola, soybean, and flaxseed. Limit unhealthy (saturated) fats, such as butter, palm oil, and coconut oil. And limit fats found in animal products, such as meat and dairy products made with whole milk. Try to eat red meat only a few times a month in very small amounts. Limit sweets and desserts to only a few times a week. This includes sugar-sweetened drinks like soda. The Mediterranean diet may also include red wine with your meal--1 glass each day for women and up to 2 glasses a day for men. Tips for eating at home  Use herbs, spices, garlic, lemon zest, and citrus juice instead of salt to add flavor to foods. Add avocado slices to your sandwich instead of lucio. Have fish for lunch or dinner instead of red meat. Brush the fish with olive oil, and broil or grill it. Sprinkle your salad with seeds or nuts instead of cheese. Cook with olive or canola oil instead of butter or oils that are high in saturated fat.   Switch from 2% milk or whole milk to 1% or fat-free milk.  Dip raw vegetables in a vinaigrette dressing or hummus instead of dips made from mayonnaise or sour cream.  Have a piece of fruit for dessert instead of a piece of cake. Try baked apples, or have some dried fruit. Tips for eating out  Try broiled, grilled, baked, or poached fish instead of having it fried or breaded. Ask your  to have your meals prepared with olive oil instead of butter. Order dishes made with marinara sauce or sauces made from olive oil. Avoid sauces made from cream or mayonnaise. Choose whole-grain breads, whole wheat pasta and pizza crust, brown rice, beans, and lentils. Cut back on butter or margarine on bread. Instead, you can dip your bread in a small amount of olive oil. Ask for a side salad or grilled vegetables instead of french fries or chips. Where can you learn more? Go to http://www.smallwood.com/  Enter O407 in the search box to learn more about \"Learning About the Mediterranean Diet. \"  Current as of: September 8, 2021               Content Version: 13.2  © 9408-8828 [employer]. Care instructions adapted under license by [email or web address] (which disclaims liability or warranty for this information). If you have questions about a medical condition or this instruction, always ask your healthcare professional. [address] any warranty or liability for your use of this information.

## 2022-10-04 NOTE — PROGRESS NOTES
1. \"Have you been to the ER, urgent care clinic since your last visit? Hospitalized since your last visit? \" No    2. \"Have you seen or consulted any other health care providers outside of the [address] [place] [address] since your last visit? \" No     3. For patients aged 39-70: Has the patient had a colonoscopy / FIT/ Cologuard? Yes - no Care Gap present      If the patient is female:    4. For patients aged 41-77: Has the patient had a mammogram within the past 2 years? Yes - no Care Gap present      5. For patients aged 21-65: Has the patient had a pap smear?  NA - based on age or sex

## 2022-10-10 ENCOUNTER — HOSPITAL ENCOUNTER (OUTPATIENT)
Dept: MAMMOGRAPHY | Age: 67
Discharge: HOME | End: 2022-10-10
Attending: EMERGENCY MEDICINE
Payer: MEDICARE

## 2022-10-10 DIAGNOSIS — R92.0 ABNORMAL FINDING ON MAMMOGRAPHY, MICROCALCIFICATION: ICD-10-CM

## 2022-10-10 DIAGNOSIS — R92.8 ABNORMAL MAMMOGRAM: ICD-10-CM

## 2022-10-10 PROCEDURE — 88305 TISSUE EXAM BY PATHOLOGIST: CPT

## 2022-10-10 PROCEDURE — 74011000250 HC RX REV CODE- 250: Performed by: EMERGENCY MEDICINE

## 2022-10-10 PROCEDURE — 19081 BX BREAST 1ST LESION STRTCTC: CPT

## 2022-10-10 PROCEDURE — 77066 DX MAMMO INCL CAD BI: CPT

## 2022-10-10 RX ORDER — LIDOCAINE HYDROCHLORIDE AND EPINEPHRINE 10; 10 MG/ML; UG/ML
1-20 INJECTION, SOLUTION INFILTRATION; PERINEURAL
Status: COMPLETED | OUTPATIENT
Start: 2022-10-10 | End: 2022-10-10

## 2022-10-10 RX ORDER — LIDOCAINE HYDROCHLORIDE 10 MG/ML
1-5 INJECTION, SOLUTION EPIDURAL; INFILTRATION; INTRACAUDAL; PERINEURAL
Status: COMPLETED | OUTPATIENT
Start: 2022-10-10 | End: 2022-10-10

## 2022-10-10 RX ADMIN — LIDOCAINE HYDROCHLORIDE AND EPINEPHRINE 20 MG: 10; 10 INJECTION, SOLUTION INFILTRATION; PERINEURAL at 10:02

## 2022-10-10 RX ADMIN — LIDOCAINE HYDROCHLORIDE AND EPINEPHRINE 20 MG: 10; 10 INJECTION, SOLUTION INFILTRATION; PERINEURAL at 11:01

## 2022-10-10 RX ADMIN — LIDOCAINE HYDROCHLORIDE 3 ML: 10 INJECTION, SOLUTION EPIDURAL; INFILTRATION; INTRACAUDAL; PERINEURAL at 10:00

## 2022-10-10 RX ADMIN — LIDOCAINE HYDROCHLORIDE 3 ML: 10 INJECTION, SOLUTION EPIDURAL; INFILTRATION; INTRACAUDAL; PERINEURAL at 11:00

## 2022-10-16 DIAGNOSIS — E78.2 MIXED HYPERLIPIDEMIA: ICD-10-CM

## 2022-10-16 DIAGNOSIS — Z79.4 TYPE 2 DIABETES MELLITUS WITH STAGE 3A CHRONIC KIDNEY DISEASE, WITH LONG-TERM CURRENT USE OF INSULIN (HCC): ICD-10-CM

## 2022-10-16 DIAGNOSIS — E03.9 ACQUIRED HYPOTHYROIDISM: ICD-10-CM

## 2022-10-16 DIAGNOSIS — N18.31 TYPE 2 DIABETES MELLITUS WITH STAGE 3A CHRONIC KIDNEY DISEASE, WITH LONG-TERM CURRENT USE OF INSULIN (HCC): ICD-10-CM

## 2022-10-16 DIAGNOSIS — E11.22 TYPE 2 DIABETES MELLITUS WITH STAGE 3A CHRONIC KIDNEY DISEASE, WITH LONG-TERM CURRENT USE OF INSULIN (HCC): ICD-10-CM

## 2022-10-16 DIAGNOSIS — I10 ESSENTIAL HYPERTENSION, BENIGN: Primary | ICD-10-CM

## 2022-10-16 RX ORDER — LEVOTHYROXINE SODIUM 175 UG/1
TABLET ORAL
Qty: 90 TABLET | Refills: 3 | Status: SHIPPED | OUTPATIENT
Start: 2022-10-16

## 2022-10-16 NOTE — PROGRESS NOTES
Please call. Hemoglobin A1c is 6.9 now at target continue present regimen. Mild protein spillage and keeping the blood sugar in control will help this. Her thyroid-stimulating hormone is 13.3 which is high. It shows that we do not have enough thyroid hormone on board so we will increase her dose to 175 mcg daily and recheck before next visit. Cholesterol 126 LDL cholesterol 59.6 both at target  Her kidney function is stable. Her complete blood count is normal.  Labs before next visit.

## 2022-10-21 NOTE — PROGRESS NOTES
TC was made to pt and pt verified name and d.ob pt was made aware of results and increase RX for Thyroid medication.

## 2023-02-01 NOTE — PROGRESS NOTES
ICD-10-CM ICD-9-CM    1. Medicare annual wellness visit, subsequent  Z00.00 V70.0 FULL CODE      DEPRESSION SCREEN ANNUAL      varicella-zoster recombinant, PF, (Shingrix, PF,) 50 mcg/0.5 mL susr injection      tetanus-diphtheria toxids-td 5-2 Lf unit/0.5 mL injection      2. Advanced directives, counseling/discussion  Z71.89 V65.49 FULL CODE      3. Screening for depression  Z13.31 V79.0 DEPRESSION SCREEN ANNUAL      4. Screen for colon cancer  Z12.11 V76.51       5. Essential hypertension  I10 401.9 CBC WITH AUTOMATED DIFF      METABOLIC PANEL, COMPREHENSIVE      6. Type 2 diabetes mellitus with stage 3a chronic kidney disease, with long-term current use of insulin ([facility])  E11.22 250.40 MICROALBUMIN, UR, RAND W/ MICROALB/CREAT RATIO    N18.31 585.3 AMB POC HEMOGLOBIN A1C    Z79.4 V58.67 CANCELED: HEMOGLOBIN A1C WITH EAG      7. Mixed hyperlipidemia  E78.2 272.2 LIPID PANEL      8. Obesity, Class III, BMI 40-49.9 (morbid obesity) ([facility])  E66.01 278.01       9. Stage 3a chronic kidney disease ([facility])  N18.31 585.3       10. Osteoarthritis, unspecified osteoarthritis type, unspecified site  M19.90 715.90       11. Acquired hypothyroidism  E03.9 244.9 TSH 3RD GENERATION          Follow-up and Dispositions    Return in about 1 year (around 2/7/2024), or if symptoms worsen or fail to improve. Assessment and plan  Essential hypertension fair control today, follow  Hyperlipidemia. Recheck labs today  Acquired hypothyroidism, TSH 13 in October 2022. Recheck today  Unhealthy weight discussed diet and exercise discussed again. No meds for now. Having trouble getting her blood pressure down. Consider phentermine in the future. Diabetes mellitus. Hemoglobin A1c 7.4. Increase to 63 units insulin. Continue the other medications the same   there was mild microalbumin to creatinine ratio increased to 74. Reevaluate. Nocturia 1 to 2 at night , follow  Degenerative joint disease.   Using Aleve with good success. Fell injuring the right shoulder with good range of motion. Follow-up. Lab results and schedule of future lab studies reviewed with patient. For labs today. Diagnostic and radiologic results and the schedule of future studies were reviewed with the patient  All questions answered and understood. Diagnostics this visit  Follow-up 4 months  Health Maintenance Due   Topic Date Due    Shingles Vaccine (1 of 2) Never done    Medicare Yearly Exam  11/03/2022    DTaP/Tdap/Td series (2 - Td or Tdap) 12/05/2022    COVID-19 Vaccine (4 - Booster for Walker series) 12/21/2022    Colorectal Cancer Screening Combo  02/07/2023    Foot Exam Q1  02/23/2023   Cologuard one year prior  Arranging mammogram [facility]. Prior positive screening. Does not want DEXA  For eye check September. Dr. Diony Rojo      Subjective:   Ijeoma Marie is a 70 y.o. female has Gross hematuria, Recurrent UTI, Morbid obesity ([address] Incisional hernia, Esophagitis, unspecified, Generalized osteoarthrosis, involving multiple sites, Unspecified venous (peripheral) insufficiency, Hypothyroidism, Essential hypertension, benign, DM type 2 (diabetes mellitus, type 2) ([address] Fibrocystic change of breast, Mixed hyperlipidemia, Chronic renal disease, stage III, and Type 2 diabetes mellitus with chronic kidney disease ([address] on their problem list.. No chief complaint on file. Previous visit 10/4/2022   The primary encounter diagnosis was Medicare annual wellness visit, subsequent.  Diagnoses of Essential hypertension, Type 2 diabetes mellitus with stage 3a chronic kidney disease, with long-term current use of insulin ([address] Mixed hyperlipidemia, Obesity, Class III, BMI 40-49.9 (morbid obesity) ([address] Stage 3a chronic kidney disease ([address] Osteoarthritis, unspecified osteoarthritis type, unspecified site, Acquired hypothyroidism, Advanced directives, counseling/discussion, Screen for colon cancer, and Screening for depression were also pertinent to this visit. Seen previously by our practice on 5/31/2022  The primary encounter diagnosis was Medicare annual wellness visit, subsequent. Diagnoses of Essential hypertension, Type 2 diabetes mellitus with stage 3a chronic kidney disease, with long-term current use of insulin ([facility] Mixed hyperlipidemia, Obesity, Class III, BMI 40-49.9 (morbid obesity) ([facility] Stage 3a chronic kidney disease ([facility] Osteoarthritis, unspecified osteoarthritis type, unspecified site, Acquired hypothyroidism, Advanced directives, counseling/discussion, Screen for colon cancer, and Screening for depression were also pertinent to this visit. For breast biopsy 10/10/2022  For cataract surgery December. Seen previously by our practice on 2/23/2022  Labs 5/23/2022 CBC within normal limits. CMP glucose 109, hemoglobin A1c 7.7. No change. Microalbumin to creatinine ratio 108. TSH 1.83 improved from 7.58 last visit. Now in normal range. Creatinine 1.04, GFR estimated to be 53. Total cholesterol 151 triglycerides 171 LDL cholesterol 75.8. The CMP showed a glucose of 200 GFR approximately 52, albumin low at 3.3 and otherwise unremarkable. Hemoglobin A1c 7.7 with an average glucose of 174. Microalbumin to creatinine ratio 385, elevated. Total cholesterol 138 LDL cholesterol 70.8. TSH 7.58. Elevated. Essential hypertension. Elevated reading today. We will not change as this is the first visit to us. Adjust meds if elevated on next visit. Type 2 diabetes mellitus, insulin requiring. Not controlled in May 2021 and not at target of less than 7. Tired Follow-up labs ordered. If elevated with new labs would adjust medication. Mixed hyperlipidemia. Chronic problem. Not controlled in May 2021. Follow-up labs ordered. If still elevated adjust meds. Acquired hypothyroidism. TSH elevated on last visit in May 2021. Repeat level ordered. If TSH elevated would increase the Synthroid.   reviewed diet, exercise and weight control    Fall,   Tripped on dog three days ago  Hit knee and pulled the right shoulder. Taking Naproxen  Hypertension  The patient has no headaches, visual changes, chest pain or pressure,dyspnea, orthopnea, or PND. There is no problem with the medication. Rechecking on elevated blood pressure at this time. She has been taking the medication regularly. She had previously been on metoprolol but this caused a significant bradycardia. No history of secondary causes for hypertension. Diabetes mellitus  The patient denies polyuria, polydipsia, or polyphagia. The patient denies any other aggravating or relieving factors There has been no problem with the medications. She has not adhered to any specific diet. Hemoglobin A1c 7.4  Key Antihyperglycemic Medications               Lantus U-100 Insulin 100 unit/mL injection (Taking) INJECT 60 UNITS SUBCUTANEOUSLY TWICE DAILY FOR  TYPE  2  DIABETES  MELLITUS    Januvia 100 mg tablet (Taking) Take 1 tablet by mouth once daily    glipiZIDE SR (GLUCOTROL XL) 10 mg CR tablet (Taking) TAKE 2 TABLETS BY MOUTH IN THE MORNING            Hyperlipidemia   The patient denies any myalgias or weakness. No abdominal discomfort admitted to. The patient denies any difficulty with or side effects from the medication. BP Readings from Last 3 Encounters:   10/04/22 (!) 146/49   05/31/22 (!) 160/68   02/23/22 (!) 160/65     Please press want to confirm that you are ready to be connected with irina this is Dr. Brennen Tafoya Ms. speak to Ms. Vaughn please all right very good okay to get your date of birth just for confirmation all right how can I help you today I can get the day will questioning what medications are you taking back left back so heavy had any fever or chills 0.2 and with C any discomfort anywhere in the nose or face he has about the left side of back of have you had this before I have I was then treated is a very good he have any other medical problems    Lab Results Component Value Date/Time    Hemoglobin A1c 6.9 (H) 10/04/2022 11:43 AM     Lab Results   Component Value Date/Time    Cholesterol, total 126 10/04/2022 11:43 AM    HDL Cholesterol 37 (L) 10/04/2022 11:43 AM    LDL, calculated 59.6 10/04/2022 11:43 AM    VLDL, calculated 29.4 10/04/2022 11:43 AM    Triglyceride 147 10/04/2022 11:43 AM    CHOL/HDL Ratio 3.4 10/04/2022 11:43 AM       Hypothyroidism  The patient denies fatigue, cold intolerance, weight gain, constipation, dry skin, myalgia, constipation, joint aches or menstrual irregularities. There is no history of cognitive , dry skin hoarseness or edema. The patient has not noticed a puffy face, tongue enlargement, or loss of  hair or eyebrows. The patient has no problem with her medication. Lab Results   Component Value Date/Time    TSH 13.30 (H) 10/04/2022 11:43 AM       Lab Results   Component Value Date/Time    Sodium 140 10/04/2022 11:43 AM    Potassium 4.5 10/04/2022 11:43 AM    Chloride 106 10/04/2022 11:43 AM    CO2 29 10/04/2022 11:43 AM    Anion gap 5 10/04/2022 11:43 AM    Glucose 137 (H) 10/04/2022 11:43 AM    BUN 23 (H) 10/04/2022 11:43 AM    Creatinine 1.08 10/04/2022 11:43 AM    BUN/Creatinine ratio 21 (H) 10/04/2022 11:43 AM    GFR est AA >60 05/23/2022 01:09 PM    GFR est non-AA 53 (L) 05/23/2022 01:09 PM    Calcium 9.0 10/04/2022 11:43 AM     Lab Results   Component Value Date/Time    WBC 11.2 10/04/2022 11:43 AM    HGB 12.6 10/04/2022 11:43 AM    HCT 37.9 10/04/2022 11:43 AM    PLATELET 138 79/00/4098 11:43 AM    MCV 89.8 10/04/2022 11:43 AM     Smoker   70 y.o.. reports that she quit smoking about 10 years ago. Her smoking use included cigarettes. She has never used smokeless tobacco.  All told smoked 25 years or less, Intermittent smoker. 1 ppd aware of the side effects of smoking. She wants to us to recount the years she smoked which may be less than 20 but she is not sure. Does not want a CT scan ordered at this time.   Aware of the risk benefits and alternatives. Review of Systems   Constitutional:  Negative for chills and fever. Cold intolerance   HENT:  Negative for hearing loss. Eyes:  Negative for blurred vision, discharge and redness. Cataracts both eyes   Respiratory:  Negative for cough and shortness of breath. Cardiovascular:  Positive for leg swelling. Negative for chest pain, palpitations and orthopnea. Cardiac murmur   Gastrointestinal:  Negative for abdominal pain, blood in stool, constipation, diarrhea and melena. Right-sided ventral hernia   Genitourinary:  Negative for dysuria, frequency and urgency. Musculoskeletal:  Negative for joint pain and myalgias. Skin:  Negative for rash (dishydrotic eczema). Neurological:  Negative for dizziness, focal weakness and headaches. Psychiatric/Behavioral:  Negative for depression. The patient is not nervous/anxious. Cologuard test negative. Current Outpatient Medications   Medication Sig    pravastatin (PRAVACHOL) 40 mg tablet Take 1 tablet by mouth nightly    Lantus U-100 Insulin 100 unit/mL injection INJECT 60 UNITS SUBCUTANEOUSLY TWICE DAILY FOR  TYPE  2  DIABETES  MELLITUS    Januvia 100 mg tablet Take 1 tablet by mouth once daily    amLODIPine (NORVASC) 10 mg tablet Take 1 tablet by mouth once daily    chlorthalidone (HYGROTON) 25 mg tablet Take 1 tablet by mouth once daily    atenoloL (TENORMIN) 100 mg tablet Take 1 tablet by mouth once daily    levothyroxine (SYNTHROID) 175 mcg tablet TAKE 1 TABLET BY MOUTH ONCE DAILY    glipiZIDE SR (GLUCOTROL XL) 10 mg CR tablet TAKE 2 TABLETS BY MOUTH IN THE MORNING    benazepriL (LOTENSIN) 40 mg tablet Take 1 Tablet by mouth daily.  Indications: high blood pressure    Blood-Glucose Meter monitoring kit Test daily as directed  Dx E11.9    glucose blood VI test strips (blood glucose test) strip Test daily as directed  Dx E11.9    naproxen sodium (ANAPROX) 550 mg tablet TAKE 1 TABLET BY MOUTH TWICE DAILY AS NEEDED FOR PAIN     No current facility-administered medications for this visit. Allergies   Allergen Reactions    Metformin Nausea Only     has Gross hematuria, Recurrent UTI, Morbid obesity ([address] Incisional hernia, Esophagitis, unspecified, Generalized osteoarthrosis, involving multiple sites, Unspecified venous (peripheral) insufficiency, Hypothyroidism, Essential hypertension, benign, DM type 2 (diabetes mellitus, type 2) ([address] Fibrocystic change of breast, Mixed hyperlipidemia, Chronic renal disease, stage III, and Type 2 diabetes mellitus with chronic kidney disease ([address] on their problem list.    Past Surgical History:   Procedure Laterality Date    HX COLONOSCOPY  4/2005    (anupamle) neg    HX HERNIA REPAIR Right     abdominal hernia, right side    HX HYSTERECTOMY  1994    HX OTHER SURGICAL      pilonidal cyst excision x 2    ID BIOPSY OF BREAST, INCISIONAL  Jan 2012    right breast      reports that she quit smoking about 10 years ago. Her smoking use included cigarettes. She has never used smokeless tobacco. She reports current alcohol use. She reports that she does not use drugs. family history includes Cancer in her daughter and father; Heart Attack in her mother; Heart Disease in an other family member; Lung Disease in her mother; Prostate Cancer (age of onset: 80) in her father. There were no vitals taken for this visit. Physical Exam  Vitals and nursing note reviewed. Constitutional:       Appearance: She is well-developed. HENT:      Head: Normocephalic and atraumatic. Right Ear: External ear normal.      Left Ear: Tympanic membrane and external ear normal.      Nose: Nose normal.      Mouth/Throat:      Mouth: Mucous membranes are dry. Eyes:      Conjunctiva/sclera: Conjunctivae normal.      Pupils: Pupils are equal, round, and reactive to light. Cardiovascular:      Rate and Rhythm: Normal rate and regular rhythm.       Heart sounds: Murmur (2/6 systolic murmur second right intercostal space) heard. Pulmonary:      Effort: Pulmonary effort is normal. No respiratory distress. Breath sounds: Normal breath sounds. No wheezing or rales. Chest:      Chest wall: No tenderness. Abdominal:      General: Bowel sounds are normal. There is distension (Right-sided distention. Correlate with her aforementioned hernia. She also has a midline scar below the umbilicus. Well-healed). Palpations: Abdomen is soft. There is no mass. Tenderness: There is no abdominal tenderness. There is no guarding or rebound. Musculoskeletal:         General: Normal range of motion. Cervical back: Normal range of motion and neck supple. Skin:     General: Skin is warm and dry. Findings: No erythema or rash. Neurological:      Mental Status: She is alert and oriented to person, place, and time. Psychiatric:         Mood and Affect: Mood normal.         Behavior: Behavior normal.         Judgment: Judgment normal.          Results for orders placed or performed during the hospital encounter of 10/04/22   HEMOGLOBIN A1C WITH EAG   Result Value Ref Range    Hemoglobin A1c 6.9 (H) 4.2 - 5.6 %    Est. average glucose 151 mg/dL   CBC WITH AUTOMATED DIFF   Result Value Ref Range    WBC 11.2 4.6 - 13.2 K/uL    RBC 4.22 4.20 - 5.30 M/uL    HGB 12.6 12.0 - 16.0 g/dL    HCT 37.9 35.0 - 45.0 %    MCV 89.8 78.0 - 100.0 FL    MCH 29.9 24.0 - 34.0 PG    MCHC 33.2 31.0 - 37.0 g/dL    RDW 13.9 11.6 - 14.5 %    PLATELET 524 010 - 359 K/uL    MPV 10.0 9.2 - 11.8 FL    NRBC 0.0 0  WBC    ABSOLUTE NRBC 0.00 0.00 - 0.01 K/uL    NEUTROPHILS 75 (H) 40 - 73 %    LYMPHOCYTES 16 (L) 21 - 52 %    MONOCYTES 5 3 - 10 %    EOSINOPHILS 3 0 - 5 %    BASOPHILS 1 0 - 2 %    IMMATURE GRANULOCYTES 0 0.0 - 0.5 %    ABS. NEUTROPHILS 8.4 (H) 1.8 - 8.0 K/UL    ABS. LYMPHOCYTES 1.8 0.9 - 3.6 K/UL    ABS. MONOCYTES 0.6 0.05 - 1.2 K/UL    ABS. EOSINOPHILS 0.3 0.0 - 0.4 K/UL    ABS.  BASOPHILS 0.1 0.0 - 0.1 K/UL    ABS. IMM. GRANS. 0.0 0.00 - 0.04 K/UL    DF AUTOMATED     METABOLIC PANEL, BASIC   Result Value Ref Range    Sodium 140 136 - 145 mmol/L    Potassium 4.5 3.5 - 5.5 mmol/L    Chloride 106 100 - 111 mmol/L    CO2 29 21 - 32 mmol/L    Anion gap 5 3.0 - 18 mmol/L    Glucose 137 (H) 74 - 99 mg/dL    BUN 23 (H) 7.0 - 18 MG/DL    Creatinine 1.08 0.6 - 1.3 MG/DL    BUN/Creatinine ratio 21 (H) 12 - 20      eGFR 56 (L) >60 ml/min/1.73m2    Calcium 9.0 8.5 - 10.1 MG/DL   LIPID PANEL   Result Value Ref Range    LIPID PROFILE          Cholesterol, total 126 <200 MG/DL    Triglyceride 147 <150 MG/DL    HDL Cholesterol 37 (L) 40 - 60 MG/DL    LDL, calculated 59.6 0 - 100 MG/DL    VLDL, calculated 29.4 MG/DL    CHOL/HDL Ratio 3.4 0 - 5.0     MICROALBUMIN, UR, RAND W/ MICROALB/CREAT RATIO   Result Value Ref Range    Microalbumin,urine random 8.24 (H) 0 - 3.0 MG/DL    Creatinine, urine random 112.00 30 - 125 mg/dL    Microalbumin/Creat ratio (mg/g creat) 74 (H) 0 - 30 mg/g   TSH 3RD GENERATION   Result Value Ref Range    TSH 13.30 (H) 0.36 - 3.74 uIU/mL       We discussed the expected course, resolution and complications of the diagnosis(es) in detail. Medication risks, benefits, costs, interactions, and alternatives were discussed as indicated. I advised her to contact the office if her condition worsens, changes or fails to improve as anticipated. She expressed understanding with the diagnosis(es) and plan. This note was done with the assistance of dragon speech software.   Some inadvertent errors or omissions may be present

## 2023-02-07 ENCOUNTER — HOSPITAL ENCOUNTER (OUTPATIENT)
Dept: LAB | Age: 68
Discharge: HOME | End: 2023-02-07
Payer: MEDICARE

## 2023-02-07 ENCOUNTER — OFFICE VISIT (OUTPATIENT)
Dept: FAMILY MEDICINE CLINIC | Age: 68
End: 2023-02-07
Payer: MEDICARE

## 2023-02-07 VITALS
OXYGEN SATURATION: 95 % | DIASTOLIC BLOOD PRESSURE: 58 MMHG | SYSTOLIC BLOOD PRESSURE: 167 MMHG | HEIGHT: 63 IN | RESPIRATION RATE: 16 BRPM | BODY MASS INDEX: 41.99 KG/M2 | HEART RATE: 53 BPM | WEIGHT: 237 LBS

## 2023-02-07 DIAGNOSIS — I10 ESSENTIAL HYPERTENSION: ICD-10-CM

## 2023-02-07 DIAGNOSIS — E78.2 MIXED HYPERLIPIDEMIA: ICD-10-CM

## 2023-02-07 DIAGNOSIS — Z13.31 SCREENING FOR DEPRESSION: ICD-10-CM

## 2023-02-07 DIAGNOSIS — E03.9 ACQUIRED HYPOTHYROIDISM: ICD-10-CM

## 2023-02-07 DIAGNOSIS — N18.31 TYPE 2 DIABETES MELLITUS WITH STAGE 3A CHRONIC KIDNEY DISEASE, WITH LONG-TERM CURRENT USE OF INSULIN (HCC): ICD-10-CM

## 2023-02-07 DIAGNOSIS — Z71.89 ADVANCED DIRECTIVES, COUNSELING/DISCUSSION: ICD-10-CM

## 2023-02-07 DIAGNOSIS — Z12.11 SCREEN FOR COLON CANCER: ICD-10-CM

## 2023-02-07 DIAGNOSIS — Z79.4 TYPE 2 DIABETES MELLITUS WITH STAGE 3A CHRONIC KIDNEY DISEASE, WITH LONG-TERM CURRENT USE OF INSULIN (HCC): ICD-10-CM

## 2023-02-07 DIAGNOSIS — M19.90 OSTEOARTHRITIS, UNSPECIFIED OSTEOARTHRITIS TYPE, UNSPECIFIED SITE: ICD-10-CM

## 2023-02-07 DIAGNOSIS — Z00.00 MEDICARE ANNUAL WELLNESS VISIT, SUBSEQUENT: Primary | ICD-10-CM

## 2023-02-07 DIAGNOSIS — N18.31 STAGE 3A CHRONIC KIDNEY DISEASE (HCC): ICD-10-CM

## 2023-02-07 DIAGNOSIS — E11.22 TYPE 2 DIABETES MELLITUS WITH STAGE 3A CHRONIC KIDNEY DISEASE, WITH LONG-TERM CURRENT USE OF INSULIN (HCC): ICD-10-CM

## 2023-02-07 DIAGNOSIS — E66.01 OBESITY, CLASS III, BMI 40-49.9 (MORBID OBESITY) (HCC): ICD-10-CM

## 2023-02-07 LAB
ALBUMIN SERPL-MCNC: 3.4 G/DL (ref 3.4–5)
ALBUMIN/GLOB SERPL: 1 (ref 0.8–1.7)
ALP SERPL-CCNC: 72 U/L (ref 45–117)
ALT SERPL-CCNC: 17 U/L (ref 13–56)
ANION GAP SERPL CALC-SCNC: 8 MMOL/L (ref 3–18)
AST SERPL-CCNC: 11 U/L (ref 10–38)
BASOPHILS # BLD: 0.1 K/UL (ref 0–0.1)
BASOPHILS NFR BLD: 1 % (ref 0–2)
BILIRUB SERPL-MCNC: 0.7 MG/DL (ref 0.2–1)
BUN SERPL-MCNC: 25 MG/DL (ref 7–18)
BUN/CREAT SERPL: 25 (ref 12–20)
CALCIUM SERPL-MCNC: 9.3 MG/DL (ref 8.5–10.1)
CHLORIDE SERPL-SCNC: 106 MMOL/L (ref 100–111)
CHOLEST SERPL-MCNC: 156 MG/DL
CO2 SERPL-SCNC: 25 MMOL/L (ref 21–32)
CREAT SERPL-MCNC: 0.99 MG/DL (ref 0.6–1.3)
CREAT UR-MCNC: 78 MG/DL (ref 30–125)
DIFFERENTIAL METHOD BLD: ABNORMAL
EOSINOPHIL # BLD: 0.3 K/UL (ref 0–0.4)
EOSINOPHIL NFR BLD: 3 % (ref 0–5)
ERYTHROCYTE [DISTWIDTH] IN BLOOD BY AUTOMATED COUNT: 13.5 % (ref 11.6–14.5)
GLOBULIN SER CALC-MCNC: 3.5 G/DL (ref 2–4)
GLUCOSE SERPL-MCNC: 211 MG/DL (ref 74–99)
HBA1C MFR BLD HPLC: 7.4 %
HCT VFR BLD AUTO: 40.3 % (ref 35–45)
HDLC SERPL-MCNC: 43 MG/DL (ref 40–60)
HDLC SERPL: 3.6 (ref 0–5)
HGB BLD-MCNC: 13.3 G/DL (ref 12–16)
IMM GRANULOCYTES # BLD AUTO: 0 K/UL (ref 0–0.04)
IMM GRANULOCYTES NFR BLD AUTO: 0 % (ref 0–0.5)
LDLC SERPL CALC-MCNC: 89.6 MG/DL (ref 0–100)
LIPID PROFILE,FLP: NORMAL
LYMPHOCYTES # BLD: 1.9 K/UL (ref 0.9–3.6)
LYMPHOCYTES NFR BLD: 18 % (ref 21–52)
MCH RBC QN AUTO: 29 PG (ref 24–34)
MCHC RBC AUTO-ENTMCNC: 33 G/DL (ref 31–37)
MCV RBC AUTO: 88 FL (ref 78–100)
MICROALBUMIN UR-MCNC: 70 MG/DL (ref 0–3)
MICROALBUMIN/CREAT UR-RTO: 897 MG/G (ref 0–30)
MONOCYTES # BLD: 0.6 K/UL (ref 0.05–1.2)
MONOCYTES NFR BLD: 6 % (ref 3–10)
NEUTS SEG # BLD: 7.7 K/UL (ref 1.8–8)
NEUTS SEG NFR BLD: 72 % (ref 40–73)
NRBC # BLD: 0 K/UL (ref 0–0.01)
NRBC BLD-RTO: 0 PER 100 WBC
PLATELET # BLD AUTO: 364 K/UL (ref 135–420)
PMV BLD AUTO: 9.7 FL (ref 9.2–11.8)
POTASSIUM SERPL-SCNC: 4.5 MMOL/L (ref 3.5–5.5)
PROT SERPL-MCNC: 6.9 G/DL (ref 6.4–8.2)
RBC # BLD AUTO: 4.58 M/UL (ref 4.2–5.3)
SODIUM SERPL-SCNC: 139 MMOL/L (ref 136–145)
TRIGL SERPL-MCNC: 117 MG/DL (ref ?–150)
TSH SERPL DL<=0.05 MIU/L-ACNC: 3.03 UIU/ML (ref 0.36–3.74)
VLDLC SERPL CALC-MCNC: 23.4 MG/DL
WBC # BLD AUTO: 10.6 K/UL (ref 4.6–13.2)

## 2023-02-07 PROCEDURE — 82043 UR ALBUMIN QUANTITATIVE: CPT

## 2023-02-07 PROCEDURE — 85025 COMPLETE CBC W/AUTO DIFF WBC: CPT

## 2023-02-07 PROCEDURE — 36415 COLL VENOUS BLD VENIPUNCTURE: CPT

## 2023-02-07 PROCEDURE — 84443 ASSAY THYROID STIM HORMONE: CPT

## 2023-02-07 PROCEDURE — 80061 LIPID PANEL: CPT

## 2023-02-07 PROCEDURE — 80053 COMPREHEN METABOLIC PANEL: CPT

## 2023-02-07 RX ORDER — ZOSTER VACCINE RECOMBINANT, ADJUVANTED 50 MCG/0.5
KIT INTRAMUSCULAR
Qty: 0.5 ML | Refills: 1 | Status: SHIPPED | OUTPATIENT
Start: 2023-02-07

## 2023-02-07 NOTE — PROGRESS NOTES
This is the Subsequent Medicare Annual Wellness Exam, performed 12 months or more after the Initial AWV or the last Subsequent AWV    I have reviewed the patient's medical history in detail and updated the computerized patient record. Assessment/Plan   Education and counseling provided:  Are appropriate based on today's review and evaluation  End-of-Life planning (with patient's consent)    1. Medicare annual wellness visit, subsequent  The following orders have not been finalized:  -     tetanus-diphtheria toxids-td 5-2 Lf unit/0.5 mL injection  -     varicella-zoster recombinant, PF, (Shingrix, PF,) 50 mcg/0.5 mL susr injection  2. Advanced directives, counseling/discussion  3. Screening for depression  4. Screen for colon cancer       Depression Risk Factor Screening     3 most recent PHQ Screens 10/4/2022   Little interest or pleasure in doing things Not at all   Feeling down, depressed, irritable, or hopeless Not at all   Total Score PHQ 2 0       Alcohol & Drug Abuse Risk Screen    Do you average more than 1 drink per night or more than 7 drinks a week:  No    On any one occasion in the past three months have you have had more than 3 drinks containing alcohol:  No          Functional Ability and Level of Safety    Hearing: Hearing is good. Activities of Daily Living: The home contains: no safety equipment. Patient does total self care      Ambulation: with no difficulty     Fall Risk:  Fall Risk Assessment, last 12 mths 10/4/2022   Able to walk? Yes   Fall in past 12 months? 0   Do you feel unsteady? 0   Are you worried about falling 0      Abuse Screen:  Patient is not abused       Cognitive Screening    Has your family/caregiver stated any concerns about your memory: no     Cognitive Screening: Normal - Verbal Fluency Test cognitive Screening: The patient is oriented x3 and to purpose. Recent and remote memory is intact.   Judgment is normal.  The patient has the capacity to make their own decisions.     Health Maintenance Due     Health Maintenance Due   Topic Date Due    Shingles Vaccine (1 of 2) Never done    Medicare Yearly Exam  11/03/2022    DTaP/Tdap/Td series (2 - Td or Tdap) 12/05/2022    COVID-19 Vaccine (4 - Booster for Walker series) 12/21/2022    Colorectal Cancer Screening Combo  02/07/2023    Foot Exam Q1  02/23/2023       Patient Care Team   Patient Care Team:  Sloane Morales MD as PCP - General (Internal Medicine Physician)  Sloane Morales MD as PCP - [facility]  Joy Escalante MD (Surgery Physician)  Geraldine Leon MD (Urology)  Vaishali Markham MD as Consulting Provider (Ophthalmology)    History     Patient Active Problem List   Diagnosis Code    Gross hematuria R31.0    Recurrent UTI N39.0    Morbid obesity ([address] E66.01    Incisional hernia K43.2    Esophagitis, unspecified K20.90    Generalized osteoarthrosis, involving multiple sites M15.9    Unspecified venous (peripheral) insufficiency I87.2    Hypothyroidism E03.9    Essential hypertension, benign I10    DM type 2 (diabetes mellitus, type 2) ([address] E11.9    Fibrocystic change of breast N60.19    Mixed hyperlipidemia E78.2    Chronic renal disease, stage III N18.30    Type 2 diabetes mellitus with chronic kidney disease ([address] E11.22     Past Medical History:   Diagnosis Date    Breast cyst 3/2013    rt breast    Cholelithiasis     Diabetes ([address]     Endometriosis     Esophagitis, unspecified     Generalized osteoarthrosis, involving multiple sites     Gross hematuria     Hematuria, unspecified     Hypertension     Incisional hernia without mention of obstruction or gangrene     Morbid obesity ([address]     Pneumonia     Unspecified hypothyroidism     Unspecified venous (peripheral) insufficiency     Urolithiasis     Urolithiasis       Past Surgical History:   Procedure Laterality Date    HX COLONOSCOPY  4/2005    (vidhya) neg    HX HERNIA REPAIR Right     abdominal hernia, right side    HX HYSTERECTOMY 1994    HX OTHER SURGICAL      pilonidal cyst excision x 2    [place] BIOPSY OF BREAST, INCISIONAL  Jan 2012    right breast     Current Outpatient Medications   Medication Sig Dispense Refill    pravastatin (PRAVACHOL) 40 mg tablet Take 1 tablet by mouth nightly 90 Tablet 3    Lantus U-100 Insulin 100 unit/mL injection INJECT 60 UNITS SUBCUTANEOUSLY TWICE DAILY FOR  TYPE  2  DIABETES  MELLITUS 40 mL 10    Januvia 100 mg tablet Take 1 tablet by mouth once daily 90 Tablet 3    amLODIPine (NORVASC) 10 mg tablet Take 1 tablet by mouth once daily 90 Tablet 3    chlorthalidone (HYGROTON) 25 mg tablet Take 1 tablet by mouth once daily 90 Tablet 3    atenoloL (TENORMIN) 100 mg tablet Take 1 tablet by mouth once daily 90 Tablet 3    levothyroxine (SYNTHROID) 175 mcg tablet TAKE 1 TABLET BY MOUTH ONCE DAILY 90 Tablet 3    glipiZIDE SR (GLUCOTROL XL) 10 mg CR tablet TAKE 2 TABLETS BY MOUTH IN THE MORNING 120 Tablet 3    benazepriL (LOTENSIN) 40 mg tablet Take 1 Tablet by mouth daily.  Indications: high blood pressure 90 Tablet 3    Blood-Glucose Meter monitoring kit Test daily as directed  Dx E11.9 1 Kit 0    glucose blood VI test strips (blood glucose test) strip Test daily as directed  Dx E11.9 100 Strip 3    naproxen sodium (ANAPROX) 550 mg tablet TAKE 1 TABLET BY MOUTH TWICE DAILY AS NEEDED FOR PAIN 60 Tab 5     Allergies   Allergen Reactions    Metformin Nausea Only       Family History   Problem Relation Age of Onset    Lung Disease Mother     Heart Attack Mother     Prostate Cancer Father 80    Cancer Father     Heart Disease Other     Cancer Daughter      Social History     Tobacco Use    Smoking status: Former     Types: Cigarettes     Quit date: 1/1/2013     Years since quitting: 10.1    Smokeless tobacco: Never   Substance Use Topics    Alcohol use: Yes     Comment: mitzy Lott

## 2023-02-07 NOTE — PATIENT INSTRUCTIONS
All Medicare beneficiaries aged 48 and older are covered; however, when a beneficiary is at high risk, there is no minimum age required to receive a screening colonoscopy or a barium enema rendered as an alternative to a screening colonoscopy. The following are the coverage criteria for each colorectal cancer screening test/procedure. Screening FOBT  Medicare provides coverage of a screening FOBT annually (i.e., at least 11 months have passed following the month in which the last covered screening FOBT was performed) for beneficiaries aged 48 and older. This screening requires a written order from the beneficiarys attending physician. NOTE: Medicare will only provide coverage for one FOBT per year: either HCPCS code  or CPT code 83336, but not both. Screening Colonoscopy  For Beneficiaries at [address] [facility] for Developing Colorectal Cancer  Medicare provides coverage of a screening colonoscopy (HCPCS code ) once every 2 years for beneficiaries at high risk for developing colorectal cancer (i.e., at least 23 months have passed following the month in which the last covered screening colonoscopy [HCPCS code ] was performed). For Beneficiaries Not at [facility] for Developing Colorectal Cancer  Medicare provides coverage of a screening colonoscopy (HCPCS code ) for beneficiaries who do not meet the criteria for being at high risk for developing colorectal cancer once every 10 years (i.e., at least 119 months have passed following the month in which the last covered screening colonoscopy [HCPCS code ] was performed).  If the beneficiary otherwise qualifies to have a covered screening colonoscopy (HCPCS code ) based on the above but has had a covered screening flexible sigmoidoscopy (HCPCS code ), then Medicare may cover a screening colonoscopy (HCPCS code ) only after at least 47 months have passed following the month in which the last covered screening flexible sigmoidoscopy ([facility] code ) was performed. Medicare Wellness Visit, Female     The best way to live healthy is to have a lifestyle where you eat a well-balanced diet, exercise regularly, limit alcohol use, and quit all forms of tobacco/nicotine, if applicable. Regular preventive services are another way to keep healthy. Preventive services (vaccines, screening tests, monitoring & exams) can help personalize your care plan, which helps you manage your own care. Screening tests can find health problems at the earliest stages, when they are easiest to treat. [facility] follows the current, evidence-based guidelines published by the [facility] States Jhony Michi ([facility]) when recommending preventive services for our patients. Because we follow these guidelines, sometimes recommendations change over time as research supports it. (For example, mammograms used to be recommended annually. Even though Medicare will still pay for an annual mammogram, the newer guidelines recommend a mammogram every two years for women of average risk). Of course, you and your doctor may decide to screen more often for some diseases, based on your risk and your co-morbidities (chronic disease you are already diagnosed with). Preventive services for you include:  - Medicare offers their members a free annual wellness visit, which is time for you and your primary care provider to discuss and plan for your preventive service needs.  Take advantage of this benefit every year!    -Over the age of 72 should receive the recommended pneumonia vaccines.    -All adults should have a flu vaccine yearly.  -All adults should have a tetanus vaccine every 10 years.   -Over the age 48 should receive the shingles vaccines.        -All adults should be screened once for Hepatitis C.  -All adults age 38-68 who are overweight should have a diabetes screening test once every three years.   -Other screening tests and preventive services for persons with diabetes include: an eye exam to screen for diabetic retinopathy, a kidney function test, a foot exam, and stricter control over your cholesterol.   -Cardiovascular screening for adults with routine risk involves an electrocardiogram (ECG) at intervals determined by your doctor.     -Colorectal cancer screenings should be done for adults age 39-70 with no increased risk factors for colorectal cancer. There are a number of acceptable methods of screening for this type of cancer. Each test has its own benefits and drawbacks. Discuss with your doctor what is most appropriate for you during your annual wellness visit. The different tests include: colonoscopy (considered the best screening method), a fecal occult blood test, a fecal DNA test, and sigmoidoscopy.    -Lung cancer screening is recommended annually with a low dose CT scan for adults between age 54 and 68, who have smoked at least 30 pack years (equivalent of 1 pack per day for 30 days), and who is a current smoker or quit less than 15 years ago.    -A bone mass density test is recommended when a woman turns 65 to screen for osteoporosis. This test is only recommended one time, as a screening. Some providers will use this same test as a disease monitoring tool if you already have osteoporosis. -Breast cancer screenings are recommended every other year for women of normal risk, age 54-69.    -Cervical cancer screenings for women over age 72 are only recommended with certain risk factors.      Here is a list of your current Health Maintenance items (your personalized list of preventive services) with a due date:  Health Maintenance Due   Topic Date Due    Shingles Vaccine (1 of 2) Never done    Annual Well Visit  11/03/2022    DTaP/Tdap/Td  (2 - Td or Tdap) 12/05/2022    COVID-19 Vaccine (4 - Booster for Walker series) 12/21/2022    Colorectal Screening  02/07/2023    Diabetic Foot Care  02/23/2023 Vaccine Information Statement    Influenza (Flu) Vaccine (Inactivated or Recombinant): What You Need to Know    Many vaccine information statements are available in Bulgarian and other languages. See www.immunize.org/vis. Hojas de información sobre vacunas están disponibles en español y en muchos otros idiomas. Visite www.immunize.org/vis. 1. Why get vaccinated? Influenza vaccine can prevent influenza (flu). Flu is a contagious disease that spreads around the [facility] every year, usually between October and May. Anyone can get the flu, but it is more dangerous for some people. Infants and young children, people 72 years and older, pregnant people, and people with certain health conditions or a weakened immune system are at greatest risk of flu complications. Pneumonia, bronchitis, sinus infections, and ear infections are examples of flu-related complications. If you have a medical condition, such as heart disease, cancer, or diabetes, flu can make it worse. Flu can cause fever and chills, sore throat, muscle aches, fatigue, cough, headache, and runny or stuffy nose. Some people may have vomiting and diarrhea, though this is more common in children than adults. In an average year, thousands of people in the [facility] die from flu, and many more are hospitalized. Flu vaccine prevents millions of illnesses and flu-related visits to the doctor each year. 2. Influenza vaccines     CDC recommends everyone 6 months and older get vaccinated every flu season. Children 6 months through 6years of age may need 2 doses during a single flu season. Everyone else needs only 1 dose each flu season. It takes about 2 weeks for protection to develop after vaccination. There are many flu viruses, and they are always changing. Each year a new flu vaccine is made to protect against the influenza viruses believed to be likely to cause disease in the upcoming flu season.  Even when the vaccine doesnt exactly match these viruses, it may still provide some protection. Influenza vaccine does not cause flu. Influenza vaccine may be given at the same time as other vaccines. 3. Talk with your health care provider    Tell your vaccination provider if the person getting the vaccine:  Has had an allergic reaction after a previous dose of influenza vaccine, or has any severe, life-threatening allergies   Has ever had Guillain-Barré Syndrome (also called GBS)    In some cases, your health care provider may decide to postpone influenza vaccination until a future visit. Influenza vaccine can be administered at any time during pregnancy. People who are or will be pregnant during influenza season should receive inactivated influenza vaccine. People with minor illnesses, such as a cold, may be vaccinated. People who are moderately or severely ill should usually wait until they recover before getting influenza vaccine. Your health care provider can give you more information. 4. Risks of a vaccine reaction    Soreness, redness, and swelling where the shot is given, fever, muscle aches, and headache can happen after influenza vaccination. There may be a very small increased risk of Guillain-Barré Syndrome (GBS) after inactivated influenza vaccine (the flu shot). YesGraph children who get the flu shot along with pneumococcal vaccine (PCV13) and/or DTaP vaccine at the same time might be slightly more likely to have a seizure caused by fever. Tell your health care provider if a child who is getting flu vaccine has ever had a seizure. People sometimes faint after medical procedures, including vaccination. Tell your provider if you feel dizzy or have vision changes or ringing in the ears. As with any medicine, there is a very remote chance of a vaccine causing a severe allergic reaction, other serious injury, or death. 5. What if there is a serious problem?     An allergic reaction could occur after the vaccinated person leaves the clinic. If you see signs of a severe allergic reaction (hives, swelling of the face and throat, difficulty breathing, a fast heartbeat, dizziness, or weakness), call 9-1-1 and get the person to the nearest hospital.    For other signs that concern you, call your health care provider. Adverse reactions should be reported to the Vaccine Adverse Event Reporting System (VAERS). Your health care provider will usually file this report, or you can do it yourself. Visit the VAERS website at www.vaers. [facility].gov or call 8-845.372.8467. VAERS is only for reporting reactions, and VAERS staff members do not give medical advice. 6. The National Vaccine Injury Compensation Program    The [facility] Vaccine Injury Compensation Program (VICP) is a federal program that was created to compensate people who may have been injured by certain vaccines. Claims regarding alleged injury or death due to vaccination have a time limit for filing, which may be as short as two years. Visit the VICP website at www.[facility].gov/vaccinecompensation or call 0-554.254.2344 to learn about the program and about filing a claim. 7. How can I learn more? Ask your health care provider. Call your local or state health department. Visit the website of the Food and Drug Administration (FDA) for vaccine package inserts and additional information at www.fda.gov/vaccines-blood-biologics/vaccines. Contact the Centers for Disease Control and Prevention (CDC): Call 8-598.349.4015 (1-800-CDC-INFO) or  Visit CDCs influenza website at www.cdc.gov/flu. Vaccine Information Statement   Inactivated Influenza Vaccine   8/6/2021  [facility] [id number]   Department of Health and Human Services  Centers for Disease Control and Prevention    Office Use Only      Vaccine Information Statement    Pneumococcal Polysaccharide Vaccine (PPSV23):  What You Need to Know    Many Vaccine Information Statements are available in Yoruba and other languages. See www.immunize.org/vis  Hojas de información sobre vacunas están disponibles en español y en muchos otros idiomas. Visite www.immunize.org/vis    1. Why get vaccinated? Pneumococcal polysaccharide vaccine (PPSV23) can prevent pneumococcal disease. Pneumococcal disease refers to any illness caused by pneumococcal bacteria. These bacteria can cause many types of illnesses, including pneumonia, which is an infection of the lungs. Pneumococcal bacteria are one of the most common causes of pneumonia. Besides pneumonia, pneumococcal bacteria can also cause:  Ear infections  Sinus infections  Meningitis (infection of the tissue covering the brain and spinal cord)  Bacteremia (bloodstream infection)    Anyone can get pneumococcal disease, but children under 3years of age, people with certain medical conditions, adults 72 years or older, and cigarette smokers are at the highest risk. Most pneumococcal infections are mild. However, some can result in long-term problems, such as brain damage or hearing loss. Meningitis, bacteremia, and pneumonia caused by pneumococcal disease can be fatal.     2. PPSV23     PPSV23 protects against 23 types of bacteria that cause pneumococcal disease. PPSV23 is recommended for: All adults 72 years or older,  Anyone 2 years or older with certain medical conditions that can lead to an increased risk for pneumococcal disease. Most people need only one dose of PPSV23. A second dose of PPSV23, and another type of pneumococcal vaccine called PCV13, are recommended for certain high-risk groups. Your health care provider can give you more information.     People 65 years or older should get a dose of PPSV23 even if they have already gotten one or more doses of the vaccine before they turned 72.    3. Talk with your health care provider    Tell your vaccine provider if the person getting the vaccine:  Has had an allergic reaction after a previous dose of PPSV23, or has any severe, life-threatening allergies. In some cases, your health care provider may decide to postpone PPSV23 vaccination to a future visit. People with minor illnesses, such as a cold, may be vaccinated. People who are moderately or severely ill should usually wait until they recover before getting PPSV23. Your health care provider can give you more information. 4. Risks of a vaccine reaction    Redness or pain where the shot is given, feeling tired, fever, or muscle aches can happen after PPSV23. People sometimes faint after medical procedures, including vaccination. Tell your provider if you feel dizzy or have vision changes or ringing in the ears. As with any medicine, there is a very remote chance of a vaccine causing a severe allergic reaction, other serious injury, or death. 5. What if there is a serious problem? An allergic reaction could occur after the vaccinated person leaves the clinic. If you see signs of a severe allergic reaction (hives, swelling of the face and throat, difficulty breathing, a fast heartbeat, dizziness, or weakness), call 9-1-1 and get the person to the nearest hospital.    For other signs that concern you, call your health care provider. Adverse reactions should be reported to the Vaccine Adverse Event Reporting System (VAERS). Your health care provider will usually file this report, or you can do it yourself. Visit the VAERS website at www.vaers. hhs.gov or call 8-244.857.1237. VAERS is only for reporting reactions, and VAERS staff do not give medical advice. 6. How can I learn more? Ask your health care provider. Call your local or state health department. Contact the Centers for Disease Control and Prevention (CDC):   Call 9-601.846.8430 (1-800-CDC-INFO) or  Visit CDCs website at www.cdc.gov/vaccines    Vaccine Information Statement   PPSV23   10/30/2019    [facility] Disease Control and Prevention    Office Use Only      Vaccine Information Statement    Tdap (Tetanus, Diphtheria, Pertussis) Vaccine: What You Need to Know     Many vaccine information statements are available in [place] and other languages. See www.immunize.org/vis. Hojas de información sobre vacunas están disponibles en español y en muchos otros idiomas. Visite www.immunize.org/vis. 1. Why get vaccinated? Tdap vaccine can prevent tetanus, diphtheria, and pertussis. Diphtheria and pertussis spread from person to person. Tetanus enters the body through cuts or wounds. TETANUS (T) causes painful stiffening of the muscles. Tetanus can lead to serious health problems, including being unable to open the mouth, having trouble swallowing and breathing, or death. DIPHTHERIA (D) can lead to difficulty breathing, heart failure, paralysis, or death. PERTUSSIS (aP), also known as whooping cough, can cause uncontrollable, violent coughing that makes it hard to breathe, eat, or drink. Pertussis can be extremely serious especially in babies and young children, causing pneumonia, convulsions, brain damage, or death. In teens and adults, it can cause weight loss, loss of bladder control, passing out, and rib fractures from severe coughing. 2. Tdap vaccine     Tdap is only for children 7 years and older, adolescents, and adults. Adolescents should receive a single dose of Tdap, preferably at age 6 or 15 years. Pregnant people should get a dose of Tdap during every pregnancy, preferably during the early part of the third trimester, to help protect the  from pertussis. Infants are most at risk for severe, life-threatening complications from pertussis. Adults who have never received Tdap should get a dose of Tdap.       Also, adults should receive a booster dose of either Tdap or Td (a different vaccine that protects against tetanus and diphtheria but not pertussis) every 10 years, or after 5 years in the case of a severe or dirty wound or burn. Tdap may be given at the same time as other vaccines. 3. Talk with your health care provider    Tell your vaccination provider if the person getting the vaccine:  Has had an allergic reaction after a previous dose of any vaccine that protects against tetanus, diphtheria, or pertussis, or has any severe, life-threatening allergies   Has had a coma, decreased level of consciousness, or prolonged seizures within 7 days after a previous dose of any pertussis vaccine (DTP, DTaP, or Tdap)  Has seizures or another nervous system problem  Has ever had Guillain-Barré Syndrome (also called GBS)  Has had severe pain or swelling after a previous dose of any vaccine that protects against tetanus or diphtheria    In some cases, your health care provider may decide to postpone Tdap vaccination until a future visit. People with minor illnesses, such as a cold, may be vaccinated. People who are moderately or severely ill should usually wait until they recover before getting Tdap vaccine. Your health care provider can give you more information. 4. Risks of a vaccine reaction    Pain, redness, or swelling where the shot was given, mild fever, headache, feeling tired, and nausea, vomiting, diarrhea, or stomachache sometimes happen after Tdap vaccination. People sometimes faint after medical procedures, including vaccination. Tell your provider if you feel dizzy or have vision changes or ringing in the ears. As with any medicine, there is a very remote chance of a vaccine causing a severe allergic reaction, other serious injury, or death. 5. What if there is a serious problem? An allergic reaction could occur after the vaccinated person leaves the clinic.  If you see signs of a severe allergic reaction (hives, swelling of the face and throat, difficulty breathing, a fast heartbeat, dizziness, or weakness), call 9-1-1 and get the person to the nearest hospital.    For other signs that concern you, call your health care provider. Adverse reactions should be reported to the Vaccine Adverse Event Reporting System (VAERS). Your health care provider will usually file this report, or you can do it yourself. Visit the VAERS website at www.vaers. [facility].gov or call 8-614.568.4161. VAERS is only for reporting reactions, and VAERS staff members do not give medical advice. 6. The National Vaccine Injury Compensation Program    The [facility] Vaccine Injury Compensation Program (VICP) is a federal program that was created to compensate people who may have been injured by certain vaccines. Claims regarding alleged injury or death due to vaccination have a time limit for filing, which may be as short as two years. Visit the VICP website at www.[facility] [[facility]]a.gov/vaccinecompensation or call 5-961.861.3669 to learn about the program and about filing a claim. 7. How can I learn more? Ask your health care provider. Call your local or state health department. Visit the website of the Food and Drug Administration (FDA) for vaccine package inserts and additional information at www.fda.gov/vaccines-blood-biologics/vaccines. Contact the Centers for Disease Control and Prevention (CDC): Call 5-730.786.2730 (1-800-CDC-INFO) or  Visit CDCs website at www.cdc.gov/vaccines. Vaccine Information Statement   Tdap (Tetanus, Diphtheria, Pertussis) Vaccine  8/6/2021  42 LEIL[facility]ee Faria Buerger [id number]   Department of Health and Human Services  Centers for Disease Control and Prevention    Office Use Only    Vaccine Information Statement    Recombinant Zoster (Shingles) Vaccine: What You Need to Know    Many Vaccine Information Statements are available in French and other languages. See www.immunize.org/vis  Hojas de información sobre vacunas están disponibles en español y en muchos otros idiomas. Visite www.immunize.org/vis    1. Why get vaccinated?      Recombinant zoster (shingles) vaccine can prevent shingles. Shingles (also called herpes zoster, or just zoster) is a painful skin rash, usually with blisters. In addition to the rash, shingles can cause fever, headache, chills, or upset stomach. Rarely, shingles can lead to complications such as pneumonia, hearing problems, blindness, brain inflammation (encephalitis), or death. The risk of shingles increases with age. The most common complication of shingles is long-term nerve pain called postherpetic neuralgia (PHN). PHN occurs in the areas where the shingles rash was and can last for months or years after the rash goes away. The pain from PHN can be severe and debilitating. The risk of PHN increases with age. An older adult with shingles is more likely to develop PHN and have longer lasting and more severe pain than a younger person. People with weakened immune systems also have a higher risk of getting shingles and complications from the disease. Shingles is caused by varicella-zoster virus, the same virus that causes chickenpox. After you have chickenpox, the virus stays in your body and can cause shingles later in life. Shingles cannot be passed from one person to another, but the virus that causes shingles can spread and cause chickenpox in someone who has never had chickenpox or has never received chickenpox vaccine. 2. Recombinant shingles vaccine    Recombinant shingles vaccine provides strong protection against shingles. By preventing shingles, recombinant shingles vaccine also protects against PHN and other complications. Recombinant shingles vaccine is recommended for:  Adults 48 years and older  Adults 19 years and older who have a weakened immune system because of disease or treatments    Shingles vaccine is given as a two-dose series. For most people, the second dose should be given 2 to 6 months after the first dose.  Some people who have or will have a weakened immune system can get the second dose 1 to 2 months after the first dose. Ask your health care provider for guidance. People who have had shingles in the past and people who have received varicella (chickenpox) vaccine are recommended to get recombinant shingles vaccine. The vaccine is also recommended for people who have already gotten another type of shingles vaccine, the live shingles vaccine. There is no live virus in recombinant shingles vaccine. Shingles vaccine may be given at the same time as other vaccines. 3. Talk with your health care provider    Tell your vaccination provider if the person getting the vaccine:  Has had an allergic reaction after a previous dose of recombinant shingles vaccine, or has any severe, life-threatening allergies   Is currently experiencing an episode of shingles  Is pregnant     In some cases, your health care provider may decide to postpone shingles vaccination until a future visit. People with minor illnesses, such as a cold, may be vaccinated. People who are moderately or severely ill should usually wait until they recover before getting recombinant shingles vaccine. Your health care provider can give you more information. 4. Risks of a vaccine reaction    A sore arm with mild or moderate pain is very common after recombinant shingles vaccine. Redness and swelling can also happen at the site of the injection. Tiredness, muscle pain, headache, shivering, fever, stomach pain, and nausea are common after recombinant shingles vaccine. These side effects may temporarily prevent a vaccinated person from doing regular activities. Symptoms usually go away on their own in 2 to 3 days. You should still get the second dose of recombinant shingles vaccine even if you had one of these reactions after the first dose. Guillain-Barré syndrome (GBS), a serious nervous system disorder, has been reported very rarely after recombinant zoster vaccine. People sometimes faint after medical procedures, including vaccination.  Tell your provider if you feel dizzy or have vision changes or ringing in the ears. As with any medicine, there is a very remote chance of a vaccine causing a severe allergic reaction, other serious injury, or death. 5. What if there is a serious problem? An allergic reaction could occur after the vaccinated person leaves the clinic. If you see signs of a severe allergic reaction (hives, swelling of the face and throat, difficulty breathing, a fast heartbeat, dizziness, or weakness), call 9-1-1 and get the person to the nearest hospital.    For other signs that concern you, call your health care provider. Adverse reactions should be reported to the Vaccine Adverse Event Reporting System (VAERS). Your health care provider will usually file this report, or you can do it yourself. Visit the VAERS website at www.vaers. [facility].gov or call 7-906.403.8635. VAERS is only for reporting reactions, and VAERS staff members do not give medical advice. 6. How can I learn more? Ask your health care provider. Call your local or state health department. Visit the website of the Food and Drug Administration (FDA) for vaccine package inserts and additional information at www.fda.gov/vaccines-blood-biologics/vaccines. Contact the Centers for Disease Control and Prevention (CDC): Call 1-547.262.8575 (1-800-CDC-INFO) or  Visit CDCs website at www.cdc.gov/vaccines. Vaccine Information Statement   Recombinant Zoster Vaccine   2/4/2022    Department of Health and Human Services  Centers for Disease Control and Prevention    Office Use Only           Learning About Breast Cancer Screening  What is breast cancer screening? Breast cancer occurs when cells that are not normal grow in one or both of your breasts. Screening tests can help find breast cancer early. Cancer is easier to treat when it's found early. Having concerns about breast cancer is common.  That's why it's important to talk with your doctor about when to start and how often to get screened for breast cancer. How is breast cancer screening done? Several screening tests can be used to check for breast cancer. Mammograms check for signs of cancer using X-rays. They can show tumors that are too small for you or your doctor to feel. During a mammogram, a machine squeezes your breasts to make them flatter and easier to X-ray. At least two pictures are taken of each breast. One is taken from the top and one from the side. 3-D mammograms are also called digital breast tomosynthesis. Your breast is positioned on a flat plate. A top plate is pressed against your breast to keep it in position. The X-ray arm then moves in an arc above the breast and takes many pictures. A computer uses these X-rays to create a three-dimensional image. Clinical breast exams are a doctor's exam. Your doctor carefully feels your breasts and under your arms to check for lumps or other changes. After the screening, your doctor will tell you the results. You will also be told if you need any follow-up tests. When should you get screened? Talk with your doctor about when you should start being tested for breast cancer. How often you get tested and the kind of tests you get will depend on your age and your risk. The guidelines that follow are for women who have an average risk for breast cancer. If you have a higher risk for breast cancer, such as having a family history of breast cancer in multiple relatives or at a young age, your doctor may recommend different screening for you. Ages 21 to 44: Some experts recommend that women have a clinical breast exam every 3 years, starting at age 21. Ask your doctor how often you should have this test. If you have a high risk for breast cancer, talk with your doctor about when to start yearly mammograms and other screening tests. Ages 36 and older: Talk with your doctor about how often you should have mammograms and clinical breast exams.   What is your risk for breast cancer? If you don't already know your risk of breast cancer, you can ask your doctor about it. You can also look it up at www.cancer.gov/bcrisktool/. If your doctor says that you have a high or very high risk, ask about ways to reduce your risk. These could include getting extra screening, taking medicine, or having surgery. If you have a strong family history of breast cancer, ask your doctor about genetic testing. What steps can you take to stay healthy? Some things that increase your risk of breast cancer, such as your age and being female, cannot be controlled. But you can do some things to stay as healthy as you can. Learn what your breasts normally look and feel like. If you notice any changes, tell your doctor. Drink alcohol wisely. Your risk goes up the more you drink. For the best health, women should have no more than 1 drink a day or 7 drinks a week. If you smoke, quit. When you quit smoking, you lower your chances of getting many types of cancer. You can also do your best to eat well, be active, and stay at a healthy weight. Eating healthy foods and being active every day, as well as staying at a healthy weight, may help prevent cancer. Where can you learn more? Go to http://www.gray.com/. Enter U830 in the search box to learn more about \"Learning About Breast Cancer Screening. \"  Current as of: March 28, 2018  Content Version: 11.8  © 5723-7186 [employer]. Care instructions adapted under license by [employer] (which disclaims liability or warranty for this information). If you have questions about a medical condition or this instruction, always ask your healthcare professional. [place] [address] any warranty or liability for your use of this information.            Learning About Cervical Cancer Screening  What is a cervical cancer screening test?     The cervix is the lower part of the uterus that opens into the vagina. Cervical cancer screening tests check the cells on the cervix for changes that could lead to cancer. Two tests can be used to screen for cervical cancer. They may be used alone or together. A Pap test.  This test looks for changes in the cells of the cervix. Some of these cell changes could lead to cancer. A human papillomavirus (HPV) test.  This test looks for the HPV virus. Some high-risk types of HPV can cause cell changes that could lead to cervical cancer. When should you have a screening test?  If you have a cervix, you may need cervical cancer screening. This depends on your age, your risk of cervical cancer, and results from previous tests. Ages 24 to 34  Screening options include: A Pap test. If your results are normal, you can wait 3 years to have another test.  An HPV test beginning at age 22. If your results are negative, you can wait 5 years to have another test. (This test is not commonly used.)  Ages 27 to 59  Screening options include: A Pap test. If your results are normal, you can wait 3 years to have another test.  An HPV test. If your results are negative, you can wait 5 years to have another test.  A Pap test and an HPV test. If your results are normal, you can wait 5 years to be tested again. Ages 72 and older  If you are age 72 or older and you've always had normal screening results, you may not need screening. Talk to your doctor. What do the results of cervical cancer screening mean? Your test results may be normal. Or the results may show minor or serious changes to the cells on your cervix. Minor changes may go away on their own, especially if you are younger than 30. You may have an abnormal test because you have an infection of the vagina or cervix or because you have low estrogen levels after menopause that are causing the cells to change.   If you have a high-risk type of human papillomavirus (HPV) or cell changes that could turn into cancer, you may need more tests. Your doctor may suggest that you wait to be retested. Or you may need to have a colposcopy or treatment right away. Your doctor will recommend a follow-up plan based on your results and your age. Follow-up care is a key part of your treatment and safety. Be sure to make and go to all appointments, and call your doctor if you are having problems. It's also a good idea to know your test results and keep a list of the medicines you take. Where can you learn more? Go to http://essie-aura.info/  Enter P919 in the search box to learn more about \"Learning About Cervical Cancer Screening. \"  Current as of: May 4, 2022               Content Version: 13.4  © 2006-2022 [employer]. Care instructions adapted under license by [employer] (which disclaims liability or warranty for this information). If you have questions about a medical condition or this instruction, always ask your healthcare professional. [place] [address] any warranty or liability for your use of this information. Colon Cancer Screening: Care Instructions  Your Care Instructions    Colorectal cancer occurs in the colon or rectum. That's the lower part of your digestive system. It is the second-leading cause of cancer deaths in the [place]. It often starts with small growths called polyps in the colon or rectum. Polyps are usually found with screening tests. Depending on the type of test, any polyps found may be removed during the tests. Colorectal cancer usually does not cause symptoms at first. But regular tests can help find it early, before it spreads and becomes harder to treat. Experts advise routine tests for colon cancer for people starting at age 48. And they advise people with a higher risk of colon cancer to get tested sooner. Talk with your doctor about when you should start testing. Discuss which tests you need.   Follow-up care is a key part of your treatment and safety. Be sure to make and go to all appointments, and call your doctor if you are having problems. It's also a good idea to know your test results and keep a list of the medicines you take. What are the main screening tests for colon cancer? Stool tests. These include the fecal immunochemical test (FIT) and the fecal occult blood test (FOBT). These tests check stool samples for signs of cancer. If your test is positive, you will need to have a colonoscopy. Sigmoidoscopy. This test lets your doctor look at the lining of your rectum and the lowest part of your colon. Your doctor uses a lighted tube called a sigmoidoscope. This test can't find cancers or polyps in the upper part of your colon. In some cases, polyps that are found can be removed. But if your doctor finds polyps, you will need to have a colonoscopy to check the upper part of your colon. Colonoscopy. This test lets your doctor look at the lining of your rectum and your entire colon. The doctor uses a thin, flexible tool called a colonoscope. It can also be used to remove polyps or get a tissue sample (biopsy). What tests do you need? The following guidelines are for people age 48 and over who are not at high risk for colorectal cancer. You may have at least one of these tests as directed by your doctor. Fecal immunochemical test (FIT) or fecal occult blood test (FOBT) every year  Sigmoidoscopy every 5 years  Colonoscopy every 10 years  If you are age 68 to 80, you can work with your doctor to decide if screening is a good option. If you are age 80 or older, your doctor will likely advise that screening is not helpful. Talk with your doctor about when you need to be tested. And discuss which tests are right for you. Your doctor may recommend earlier or more frequent testing if you:  Have had colorectal cancer before. Have had colon polyps. Have symptoms of colorectal cancer.  These include blood in your stool and changes in your bowel habits. Have a parent, brother or sister, or child with colon polyps or colorectal cancer. Have a bowel disease. This includes ulcerative colitis and Crohn's disease. Have a rare polyp syndrome that runs in families, such as familial adenomatous polyposis (FAP). Have had radiation treatments to the belly or pelvis. When should you call for help? Watch closely for changes in your health, and be sure to contact your doctor if:    You have any changes in your bowel habits.     You have any problems. Where can you learn more? Go to http://www.gray.com/. Enter M541 in the search box to learn more about \"Colon Cancer Screening: Care Instructions. \"  Current as of: March 28, 2018  Content Version: 11.8  © 2720-5561 [employer]. Care instructions adapted under license by [employer] (which disclaims liability or warranty for this information). If you have questions about a medical condition or this instruction, always ask your healthcare professional. [place] [address] any warranty or liability for your use of this information. Osteoporosis: Care Instructions  Overview     Osteoporosis causes bones to become thin and weak. It is much more common in women than in men. Your chances of getting this disease depend on several things. These factors include the thickness of your bones (bone density), as well as health, diet, and physical activity. This disease may be very advanced before you know you have it. Sometimes the first sign is a broken bone in the hip, spine, or wrist. Or you may have sudden pain in your middle or lower back. Follow-up care is a key part of your treatment and safety. Be sure to make and go to all appointments, and call your doctor if you are having problems. It's also a good idea to know your test results and keep a list of the medicines you take. How can you care for yourself at home?   Your doctor may prescribe a bisphosphonate, such as risedronate (Actonel) or alendronate (Fosamax), for osteoporosis. If you are taking one of these medicines by mouth: Take your medicine with a full glass of water when you first get up in the morning. Do not lie down, eat, drink a beverage, or take any other medicine for at least 30 minutes after taking the drug. This helps prevent stomach problems. Do not take your medicine late in the day if you forgot to take it in the morning. Skip it, and take the usual dose the next morning. If you have side effects, tell your doctor. Your doctor may prescribe another medicine. Get enough calcium and vitamin D. The recommended dietary allowances (RDAs) for adults younger than age 46 are 1,000 mg of calcium and 600 IU of vitamin D each day. Women ages 46 to 79 need 1,200 mg of calcium and 600 IU of vitamin D each day. Men ages 46 to 79 need 1,000 mg of calcium and 600 IU of vitamin D each day. Adults 71 and older need 1,200 mg of calcium and 800 IU of vitamin D each day. It's not clear if people who already have osteoporosis need more calcium and vitamin D than this. Talk to your doctor about what's right for you. Eat foods rich in calcium, like yogurt, cheese, milk, and dark green vegetables. This is a good way to get the calcium you need. You can get vitamin D from eggs, fatty fish, cereal, and milk. Ask your doctor if you need to take a calcium plus vitamin D supplement. You may be able to get enough calcium and vitamin D through your diet. Be careful with supplements. Adults ages 23 to 48 should not get more than 2,500 mg of calcium and 4,000 IU of vitamin D each day, whether it is from supplements and/or food. Adults ages 46 and older should not get more than 2,000 mg of calcium and 4,000 IU of vitamin D each day from supplements and/or food. Limit alcohol to 2 drinks a day for men and 1 drink a day for women. Too much alcohol can cause health problems. Do not smoke.  Smoking puts you at a much higher risk for osteoporosis. If you need help quitting, talk to your doctor about stop-smoking programs and medicines. These can increase your chances of quitting for good. Get regular bone-building exercise. Weight-bearing and resistance exercises keep bones healthy by working the muscles and bones against gravity. Start out at an exercise level that feels right for you. Add a little at a time until you can do the following:  Do 30 minutes of weight-bearing exercise on most days of the week. Walking, jogging, stair climbing, and dancing are good choices. Do resistance exercises with weights or elastic bands 2 to 3 days a week. Reduce your risk of falls:  Wear supportive shoes with low heels and nonslip soles. Use a cane or walker, if you need it. Use shower chairs and bath benches. Put in handrails on stairways, around your shower or tub area, and near the toilet. Keep stairs, porches, and walkways well lit. Use night-lights. Remove throw rugs and other objects that are in the way. Avoid icy, wet, or slippery surfaces. When should you call for help? Watch closely for changes in your health, and be sure to contact your doctor if you have any problems. Where can you learn more? Go to http://www.gray.com/  Enter K100 in the search box to learn more about \"Osteoporosis: Care Instructions. \"  Current as of: December 20, 2021               Content Version: 13.4  © 9500-8083 [employer]. Care instructions adapted under license by "[employer]" (which disclaims liability or warranty for this information). If you have questions about a medical condition or this instruction, always ask your healthcare professional. [place] [address] any warranty or liability for your use of this information. Advance Care Planning: Care Instructions  Your Care Instructions  It can be hard to live with an illness that cannot be cured.  But if your health is getting worse, you may want to make decisions about end-of-life care. Planning for the end of your life does not mean that you are giving up. It is a way to make sure that your wishes are met. Clearly stating your wishes can make it easier for your loved ones. Making plans while you are still able may also ease your mind and make your final days less stressful and more meaningful. Follow-up care is a key part of your treatment and safety. Be sure to make and go to all appointments, and call your doctor if you are having problems. It's also a good idea to know your test results and keep a list of the medicines you take. What can you do to plan for the end of life? You can bring these issues up with your doctor. You do not need to wait until your doctor starts the conversation. You might start with \"I would not be willing to live with . Lucyann Push Lucyann Push Lucyann Push \" When you complete this sentence it helps your doctor understand your wishes. Talk openly and honestly with your doctor. This is the best way to understand the decisions you will need to make as your health changes. Know that you can always change your mind. Ask your doctor about commonly used life-support measures. These include tube feedings, breathing machines, and fluids given through a vein (IV). Understanding these treatments will help you decide whether you want them. You may choose to have these life-supporting treatments for a limited time. This allows a trial period to see whether they will help you. You may also decide that you want your doctor to take only certain measures to keep you alive. It is important to spell out these conditions so that your doctor and family understand them. Talk to your doctor about how long you are likely to live. He or she may be able to give you an idea of what usually happens with your specific illness. Think about preparing papers that state your wishes. This way there will not be any confusion about what you want.  You can change your instructions at any time. Which papers should you prepare? Advance directives are legal papers that tell doctors how you want to be cared for at the end of your life. You do not need a  to write these papers. Ask your doctor or your [facility] department for information on how to write your advance directives. They may have the forms for each of these types of papers. Make sure your doctor has a copy of these on file, and give a copy to a family member or close friend. Consider a do-not-resuscitate order (DNR). This order asks that no extra treatments be done if your heart stops or you stop breathing. Extra treatments may include cardiopulmonary resuscitation (CPR), electrical shock to restart your heart, or a machine to breathe for you. If you decide to have a DNR order, ask your doctor to explain and write it. Place the order in your home where everyone can easily see it. Consider a living will. A living will explains your wishes about life support and other treatments at the end of your life if you become unable to speak for yourself. Living gotti tell doctors to use or not use treatments that would keep you alive. You must have one or two witnesses or a notary present when you sign this form. Consider a durable power of  for health care. This allows you to name a person to make decisions about your care if you are not able to. Most people ask a close friend or family member. Talk to this person about the kinds of treatments you want and those that you do not want. Make sure this person understands your wishes. These legal papers are simple to change. Tell your doctor what you want to change, and ask him or her to make a note in your medical file. Give your family updated copies of the papers. Where can you learn more? Go to http://essie-aura.info/. Enter P184 in the search box to learn more about \"Advance Care Planning: Care Instructions. \"  Current as of: November 17, 2016  Content Version: 11.3  © 6504-3241 [employer]. Care instructions adapted under license by [employer] (which disclaims liability or warranty for this information). If you have questions about a medical condition or this instruction, always ask your healthcare professional. [address] any warranty or liability for your use of this information. Learning About Living [place]  What is a living will? A living will is a legal form you use to write down the kind of care you want at the end of your life. It is used by the health professionals who will treat you if you aren't able to decide for yourself. If you put your wishes in writing, your loved ones and others will know what kind of care you want. They won't need to guess. This can ease your mind and be helpful to others. A living will is not the same as an estate or property will. An estate will explains what you want to happen with your money and property after you die. Is a living will a legal document? A living will is a legal document. Each state has its own laws about living gotti. If you move to another state, make sure that your living will is legal in the state where you now live. Or you might use a universal form that has been approved by many states. This kind of form can sometimes be completed and stored online. Your electronic copy will then be available wherever you have a connection to the Internet. In most cases, doctors will respect your wishes even if you have a form from a different state. You don't need an  to complete a living will. But legal advice can be helpful if your state's laws are unclear, your health history is complicated, or your family can't agree on what should be in your living will. You can change your living will at any time. Some people find that their wishes about end-of-life care change as their health changes.   In addition to making a living will, think about completing a medical power of  form. This form lets you name the person you want to make end-of-life treatment decisions for you (your \"health care agent\") if you're not able to. Many hospitals and nursing homes will give you the forms you need to complete a living will and a medical power of . Your living will is used only if you can't make or communicate decisions for yourself anymore. If you become able to make decisions again, you can accept or refuse any treatment, no matter what you wrote in your living will. Your state may offer an online registry. This is a place where you can store your living will online so the doctors and nurses who need to treat you can find it right away. What should you think about when creating a living will? Talk about your end-of-life wishes with your family members and your doctor. Let them know what you want. That way the people making decisions for you won't be surprised by your choices. Think about these questions as you make your living will:  Do you know enough about life support methods that might be used? If not, talk to your doctor so you know what might be done if you can't breathe on your own, your heart stops, or you're unable to swallow. What things would you still want to be able to do after you receive life-support methods? Would you want to be able to walk? To speak? To eat on your own? To live without the help of machines? If you have a choice, where do you want to be cared for? In your home? At a hospital or nursing home? Do you want certain [employer]ism practices performed if you become very ill? If you have a choice at the end of your life, where would you prefer to die? At home? In a hospital or nursing home? Somewhere else? Would you prefer to be buried or cremated? Do you want your organs to be donated after you die? What should you do with your living will?   Make sure that your family members and your health care agent have copies of your living will. Give your doctor a copy of your living will to keep in your medical record. If you have more than one doctor, make sure that each one has a copy. You may want to put a copy of your living will where it can be easily found. Where can you learn more? Go to http://www.gray.com/. Enter S277 in the search box to learn more about \"Learning About Living [place]. \"  Current as of: August 8, 2016  Content Version: 11.3  © 9917-6662 [employer]. Care instructions adapted under license by [employer] (which disclaims liability or warranty for this information). If you have questions about a medical condition or this instruction, always ask your healthcare professional. [address] any warranty or liability for your use of this information. Preventing Falls: Care Instructions  Your Care Instructions    Getting around your home safely can be a challenge if you have injuries or health problems that make it easy for you to fall. Loose rugs and furniture in walkways are among the dangers for many older people who have problems walking or who have poor eyesight. People who have conditions such as arthritis, osteoporosis, or dementia also have to be careful not to fall. You can make your home safer with a few simple measures. Follow-up care is a key part of your treatment and safety. Be sure to make and go to all appointments, and call your doctor if you are having problems. It's also a good idea to know your test results and keep a list of the medicines you take. How can you care for yourself at home? Taking care of yourself  You may get dizzy if you do not drink enough water. To prevent dehydration, drink plenty of fluids, enough so that your urine is light yellow or clear like water. Choose water and other caffeine-free clear liquids.  If you have kidney, heart, or liver disease and have to limit fluids, talk with your doctor before you increase the amount of fluids you drink. Exercise regularly to improve your strength, muscle tone, and balance. Walk if you can. Swimming may be a good choice if you cannot walk easily. Have your vision and hearing checked each year or any time you notice a change. If you have trouble seeing and hearing, you might not be able to avoid objects and could lose your balance. Know the side effects of the medicines you take. Ask your doctor or pharmacist whether the medicines you take can affect your balance. Sleeping pills or sedatives can affect your balance. Limit the amount of alcohol you drink. Alcohol can impair your balance and other senses. Ask your doctor whether calluses or corns on your feet need to be removed. If you wear loose-fitting shoes because of calluses or corns, you can lose your balance and fall. Talk to your doctor if you have numbness in your feet. Preventing falls at home  Remove raised doorway thresholds, throw rugs, and clutter. Repair loose carpet or raised areas in the floor. Move furniture and electrical cords to keep them out of walking paths. Use nonskid floor wax, and wipe up spills right away, especially on ceramic tile floors. If you use a walker or cane, put rubber tips on it. If you use crutches, clean the bottoms of them regularly with an abrasive pad, such as steel wool. Keep your house well lit, especially stairways, porches, and outside walkways. Use night-lights in areas such as hallways and bathrooms. Add extra light switches or use remote switches (such as switches that go on or off when you clap your hands) to make it easier to turn lights on if you have to get up during the night. Install sturdy handrails on stairways. Move items in your cabinets so that the things you use a lot are on the lower shelves (about waist level). Keep a cordless phone and a flashlight with new batteries by your bed.  If possible, put a phone in each of the main rooms of your house, or carry a cell phone in case you fall and cannot reach a phone. Or, you can wear a device around your neck or wrist. You push a button that sends a signal for help. Wear low-heeled shoes that fit well and give your feet good support. Use footwear with nonskid soles. Check the heels and soles of your shoes for wear. Repair or replace worn heels or soles. Do not wear socks without shoes on wood floors. Walk on the grass when the sidewalks are slippery. If you live in an area that gets snow and ice in the winter, sprinkle salt on slippery steps and sidewalks. Preventing falls in the bath  Install grab bars and nonskid mats inside and outside your shower or tub and near the toilet and sinks. Use shower chairs and bath benches. Use a hand-held shower head that will allow you to sit while showering. Get into a tub or shower by putting the weaker leg in first. Get out of a tub or shower with your strong side first.  Repair loose toilet seats and consider installing a raised toilet seat to make getting on and off the toilet easier. Keep your bathroom door unlocked while you are in the shower. Where can you learn more? Go to http://www.smallwood.com/. Enter [id number] in the search box to learn more about \"Preventing Falls: Care Instructions. \"  Current as of: March 16, 2018  Content Version: 11.8  © 3559-8949 [employer]. Care instructions adapted under license by [employer] (which disclaims liability or warranty for this information). If you have questions about a medical condition or this instruction, always ask your healthcare professional. [place] [address] any warranty or liability for your use of this information.

## 2023-02-07 NOTE — ACP (ADVANCE CARE PLANNING)
Advance Care Planning     Advance Care Planning (ACP) Physician/NP/PA Conversation      Date of Conversation: 2/7/2023  Conducted with: Patient with Decision Making Capacity    Healthcare Decision Maker:   No healthcare decision makers have been documented. Click here to complete [address] including selection of the Healthcare Decision Maker Relationship (ie \"Primary\")        Care Preferences:    Hospitalization: \"If your health worsens and it becomes clear that your chance of recovery is unlikely, what would be your preference regarding hospitalization? \"  The patient would prefer comfort-focused treatment without hospitalization. Ventilation: \"If you were unable to breathe on your own and your chance of recovery was unlikely, what would be your preference about the use of a ventilator (breathing machine) if it was available to you? \"   The patient would NOT desire the use of a ventilator. Resuscitation: \"In the event your heart stopped as a result of an underlying serious health condition, would you want attempts to be made to restart your heart, or would you prefer a natural death? \"   Yes, attempt to resuscitate.         Conversation Outcomes / Follow-Up Plan:   ACP in process - information provided, considering goals and options  Reviewed DNR/DNI and patient elects Full Code (Attempt Resuscitation)     Length of Voluntary ACP Conversation in minutes:  <16 minutes (Non-Billable)    Ayala Ta MD

## 2023-03-13 NOTE — PATIENT INSTRUCTIONS
Hyperlipidemia: After Your Visit  Your Care Instructions  Hyperlipidemia is too much fat in your blood. The body has several kinds of fat, including cholesterol and triglycerides. Your body needs fat for many things, such as making new cells. But too much fat in your blood increases your chances of having a heart attack or stroke. You may be able to lower your cholesterol and triglycerides with a heart-healthy diet, exercise, and if needed, medicine. Your doctor may want you to try lifestyle changes first to see whether they lower the fat in your blood. You may need to take medicine if lifestyle changes do not lower the fat in your blood enough. Follow-up care is a key part of your treatment and safety. Be sure to make and go to all appointments, and call your doctor if you are having problems. Its also a good idea to know your test results and keep a list of the medicines you take. How can you care for yourself at home? Take your medicines  · Take your medicines exactly as prescribed. Call your doctor if you think you are having a problem with your medicine. · If you take medicine to lower your cholesterol, go to follow-up visits. You will need to have blood tests. · Do not take large doses of niacin, which is a B vitamin, while taking medicine called statins. It may increase the chance of muscle pain and liver problems. · Talk to your doctor about avoiding grapefruit juice if you are taking statins. Grapefruit juice can raise the level of this medicine in your blood. This could increase side effects. Eat more fruits, vegetables, and fiber  · Fruits and vegetables have lots of nutrients that help protect against heart disease, and they have little--if any--fat. Try to eat at least five servings a day. Dark green, deep orange, or yellow fruits and vegetables are healthy choices. · Keep carrots, celery, and other veggies handy for snacks.  Buy fruit that is in season and store it where you can see it so that you will be tempted to eat it. Cook dishes that have a lot of veggies in them, such as stir-fries and soups. · Foods high in fiber may reduce your cholesterol and provide important vitamins and minerals. High-fiber foods include whole-grain cereals and breads, oatmeal, beans, brown rice, citrus fruits, and apples. · Buy whole-grain breads and cereals instead of white bread and pastries. Limit saturated fat  · Read food labels and try to avoid saturated fat and trans fat. They increase your risk of heart disease. · Use olive or canola oil when you cook. Try cholesterol-lowering spreads, such as Benecol or Take Control. · Bake, broil, grill, or steam foods instead of frying them. · Limit the amount of high-fat meats you eat, including hot dogs and sausages. Cut out all visible fat when you prepare meat. · Eat fish, skinless poultry, and soy products such as tofu instead of high-fat meats. Soybeans may be especially good for your heart. Eat at least two servings of fish a week. Certain fish, such as salmon, contain omega-3 fatty acids, which may help reduce your risk of heart attack. · Choose low-fat or fat-free milk and dairy products. Get exercise, limit alcohol, and quit smoking  · Get more exercise. Work with your doctor to set up an exercise program. Even if you can do only a small amount, exercise will help you get stronger, have more energy, and manage your weight and your stress. Walking is an easy way to get exercise. Gradually increase the amount you walk every day. Aim for at least 30 minutes on most days of the week. You also may want to swim, bike, or do other activities. · Limit alcohol to no more than 2 drinks a day for men and 1 drink a day for women. · Do not smoke. If you need help quitting, talk to your doctor about stop-smoking programs and medicines. These can increase your chances of quitting for good. When should you call for help?   Call 911 anytime you think you may need emergency care. For example, call if:  · You have symptoms of a heart attack. These may include:  ¨ Chest pain or pressure, or a strange feeling in the chest.  ¨ Sweating. ¨ Shortness of breath. ¨ Nausea or vomiting. ¨ Pain, pressure, or a strange feeling in the back, neck, jaw, or upper belly or in one or both shoulders or arms. ¨ Lightheadedness or sudden weakness. ¨ A fast or irregular heartbeat. After you call 911, the  may tell you to chew 1 adult-strength or 2 to 4 low-dose aspirin. Wait for an ambulance. Do not try to drive yourself. · You have signs of a stroke. These may include:  ¨ Sudden numbness, paralysis, or weakness in your face, arm, or leg, especially on only one side of your body. ¨ New problems with walking or balance. ¨ Sudden vision changes. ¨ Drooling or slurred speech. ¨ New problems speaking or understanding simple statements, or feeling confused. ¨ A sudden, severe headache that is different from past headaches. · You passed out (lost consciousness). Call your doctor now or seek immediate medical care if:  · You have muscle pain or weakness. Watch closely for changes in your health, and be sure to contact your doctor if:  · You are very tired. · You have an upset stomach, gas, constipation, or belly pain or cramps. Where can you learn more? Go to [employer]  Enter C406 in the search box to learn more about \"Hyperlipidemia: After Your Visit. \"   © 7515-9882 Healthwise, Incorporated. Care instructions adapted under license by [facility] (which disclaims liability or warranty for this information). This care instruction is for use with your licensed healthcare professional. If you have questions about a medical condition or this instruction, always ask your healthcare professional. [place] [address] any warranty or liability for your use of this information.   Content Version: 4.6.933868; Last Revised: October 13, 2011                 Type 2 Diabetes: Care Instructions  Your Care Instructions     Type 2 diabetes is a disease that develops when the body's tissues cannot use insulin properly. Over time, the pancreas cannot make enough insulin. Insulin is a hormone that helps the body's cells use sugar (glucose) for energy. It also helps the body store extra sugar in muscle, fat, and liver cells. Without insulin, the sugar cannot get into the cells to do its work. It stays in the blood instead. This can cause high blood sugar levels. A person has diabetes when the blood sugar stays too high too much of the time. Over time, diabetes can lead to diseases of the heart, blood vessels, nerves, kidneys, and eyes. You may be able to control your blood sugar by losing weight, eating a healthy diet, and getting daily exercise. You may also have to take insulin or other diabetes medicine. Follow-up care is a key part of your treatment and safety. Be sure to make and go to all appointments. Call your doctor if you are having problems. It's also a good idea to know your test results and keep a list of the medicines you take. How can you care for yourself at home? · Keep your blood sugar at a target level (which you set with your doctor). ? Carbohydrate--the body's main source of fuel--affects blood sugar more than any other nutrient. Carbohydrate is in fruits, vegetables, milk, and yogurt. It also is in breads, cereals, vegetables such as potatoes and corn, and sugary foods such as candy and cakes. Follow your meal plan to know how much carbohydrate to eat at each meal and snack. ? Aim for 30 minutes of exercise on most, preferably all, days of the week. Walking is a good choice. You also may want to do other activities, such as running, swimming, cycling, or playing tennis or team sports. Try to do muscle-strengthening exercises at least 2 times a week. ? Take your medicines exactly as prescribed.  Call your doctor if you think you are having a problem with your medicine. You will get more details on the specific medicines your doctor prescribes. · Check your blood sugar as often as your doctor recommends. It is important to keep track of any symptoms you have, such as low blood sugar. Also tell your doctor if you have any changes in your activities, diet, or insulin use. · Talk to your doctor before you start taking aspirin every day. Aspirin can help certain people lower their risk of a heart attack or stroke. But taking aspirin isn't right for everyone, because it can cause serious bleeding. · Do not smoke. If you need help quitting, talk to your doctor about stop-smoking programs and medicines. These can increase your chances of quitting for good. · Keep your cholesterol and blood pressure at normal levels. You may need to take one or more medicines to reach your goals. Take them exactly as directed. Do not stop or change a medicine without talking to your doctor first.  When should you call for help? Call 911 anytime you think you may need emergency care. For example, call if:    · You passed out (lost consciousness), or you suddenly become very sleepy or confused. (You may have very low blood sugar.)   Call your doctor now or seek immediate medical care if:    · Your blood sugar is 300 mg/dL or is higher than the level your doctor has set for you. · You have symptoms of low blood sugar, such as:  ? Sweating. ? Feeling nervous, shaky, and weak. ? Extreme hunger and slight nausea. ? Dizziness and headache.  ? Blurred vision. ? Confusion. Watch closely for changes in your health, and be sure to contact your doctor if:    · You often have problems controlling your blood sugar. · You have symptoms of long-term diabetes problems, such as:  ? New vision changes. ? New pain, numbness, or tingling in your hands or feet. ? Skin problems. Where can you learn more?   Go to http://www.gray.com/  Enter C553 in the search box to learn more about \"Type 2 Diabetes: Care Instructions. \"  Current as of: August 31, 2020               Content Version: 12.8  © 2006-2021 [employer]. Care instructions adapted under license by [employer] (which disclaims liability or warranty for this information). If you have questions about a medical condition or this instruction, always ask your healthcare professional. [address] any warranty or liability for your use of this information. High Blood Pressure: Care Instructions  Overview     It's normal for blood pressure to go up and down throughout the day. But if it stays up, you have high blood pressure. Another name for high blood pressure is hypertension. Despite what a lot of people think, high blood pressure usually doesn't cause headaches or make you feel dizzy or lightheaded. It usually has no symptoms. But it does increase your risk of stroke, heart attack, and other problems. You and your doctor will talk about your risks of these problems based on your blood pressure. Your doctor will give you a goal for your blood pressure. Your goal will be based on your health and your age. Lifestyle changes, such as eating healthy and being active, are always important to help lower blood pressure. You might also take medicine to reach your blood pressure goal.  Follow-up care is a key part of your treatment and safety. Be sure to make and go to all appointments, and call your doctor if you are having problems. It's also a good idea to know your test results and keep a list of the medicines you take. How can you care for yourself at home? Medical treatment  · If you stop taking your medicine, your blood pressure will go back up. You may take one or more types of medicine to lower your blood pressure. Be safe with medicines. Take your medicine exactly as prescribed.  Call your doctor if you think you are having a problem with your medicine. · Talk to your doctor before you start taking aspirin every day. Aspirin can help certain people lower their risk of a heart attack or stroke. But taking aspirin isn't right for everyone, because it can cause serious bleeding. · See your doctor regularly. You may need to see the doctor more often at first or until your blood pressure comes down. · If you are taking blood pressure medicine, talk to your doctor before you take decongestants or anti-inflammatory medicine, such as ibuprofen. Some of these medicines can raise blood pressure. · Learn how to check your blood pressure at home. Lifestyle changes  · Stay at a healthy weight. This is especially important if you put on weight around the waist. Losing even 10 pounds can help you lower your blood pressure. · If your doctor recommends it, get more exercise. Walking is a good choice. Bit by bit, increase the amount you walk every day. Try for at least 30 minutes on most days of the week. You also may want to swim, bike, or do other activities. · Avoid or limit alcohol. Talk to your doctor about whether you can drink any alcohol. · Try to limit how much sodium you eat to less than 2,300 milligrams (mg) a day. Your doctor may ask you to try to eat less than 1,500 mg a day. · Eat plenty of fruits (such as bananas and oranges), vegetables, legumes, whole grains, and low-fat dairy products. · Lower the amount of saturated fat in your diet. Saturated fat is found in animal products such as milk, cheese, and meat. Limiting these foods may help you lose weight and also lower your risk for heart disease. · Do not smoke. Smoking increases your risk for heart attack and stroke. If you need help quitting, talk to your doctor about stop-smoking programs and medicines. These can increase your chances of quitting for good. When should you call for help? Call  911 anytime you think you may need emergency care.  This may mean having symptoms that suggest that your blood pressure is causing a serious heart or blood vessel problem. Your blood pressure may be over 180/120. For example, call 911 if:    · You have symptoms of a heart attack. These may include:  ? Chest pain or pressure, or a strange feeling in the chest.  ? Sweating. ? Shortness of breath. ? Nausea or vomiting. ? Pain, pressure, or a strange feeling in the back, neck, jaw, or upper belly or in one or both shoulders or arms. ? Lightheadedness or sudden weakness. ? A fast or irregular heartbeat. · You have symptoms of a stroke. These may include:  ? Sudden numbness, tingling, weakness, or loss of movement in your face, arm, or leg, especially on only one side of your body. ? Sudden vision changes. ? Sudden trouble speaking. ? Sudden confusion or trouble understanding simple statements. ? Sudden problems with walking or balance. ? A sudden, severe headache that is different from past headaches. · You have severe back or belly pain. Do not wait until your blood pressure comes down on its own. Get help right away. Call your doctor now or seek immediate care if:    · Your blood pressure is much higher than normal (such as 180/120 or higher), but you don't have symptoms. · You think high blood pressure is causing symptoms, such as:  ? Severe headache.  ? Blurry vision. Watch closely for changes in your health, and be sure to contact your doctor if:    · Your blood pressure measures higher than your doctor recommends at least 2 times. That means the top number is higher or the bottom number is higher, or both. · You think you may be having side effects from your blood pressure medicine. Where can you learn more? Go to http://www.gray.com/  Enter [id number] in the search box to learn more about \"High Blood Pressure: Care Instructions. \"  Current as of: August 31, 2020               Content Version: 12.8  © 4086-3390 Healthwise, [facility].    Care instructions adapted under license by [employer] (which disclaims liability or warranty for this information). If you have questions about a medical condition or this instruction, always ask your healthcare professional. [address] any warranty or liability for your use of this information. Hypothyroidism: Care Instructions  Your Care Instructions     When you have hypothyroidism, your body doesn't make enough thyroid hormone. This hormone helps your body use energy. If your thyroid level is low, you may feel tired, be constipated, have an increase in your blood pressure, or have dry skin or memory problems. You may also get cold easily, even when it is warm. Women with low thyroid levels may have heavy menstrual periods. A blood test to find your thyroid-stimulating hormone (TSH) level is used to check for hypothyroidism. A high TSH level may mean that you have it. The treatment for hypothyroidism is thyroid hormone pills. You should start to feel better in 1 to 2 weeks. Most people need treatment for the rest of their lives. You will need regular visits with your doctor to make sure you are doing well and that you have the right dose of medicine. Follow-up care is a key part of your treatment and safety. Be sure to make and go to all appointments, and call your doctor if you are having problems. It's also a good idea to know your test results and keep a list of the medicines you take. How can you care for yourself at home? · Take your thyroid hormone medicine exactly as prescribed. Call your doctor if you think you are having a problem with your medicine. Most people do not have side effects if they take the right amount of medicine regularly. ? Take the medicine 30 minutes before breakfast, and do not take it with calcium, vitamins, or iron. ? Do not take extra doses of your thyroid medicine.  It will not help you get better any faster, and it may cause side effects. ? If you forget to take a dose, do NOT take a double dose of medicine. Take your usual dose the next day. · Tell your doctor about all prescription, herbal, or over-the-counter products you take. · Take care of yourself. Eat a healthy diet, get enough sleep, and get regular exercise. When should you call for help? Call 911 anytime you think you may need emergency care. For example, call if:    · You passed out (lost consciousness).     · You have severe trouble breathing.     · You have a very slow heartbeat (less than 60 beats a minute).     · You have a low body temperature (95°F or below). Call your doctor now or seek immediate medical care if:    · You feel tired, sluggish, or weak.     · You have trouble remembering things or concentrating.     · You do not begin to feel better 2 weeks after starting your medicine. Watch closely for changes in your health, and be sure to contact your doctor if you have any problems. Where can you learn more? Go to http://www.gray.com/  Enter B654 in the search box to learn more about \"Hypothyroidism: Care Instructions. \"  Current as of: December 2, 2020               Content Version: 13.0  © 2006-2021 Healthwise, Incorporated. Care instructions adapted under license by [employer] (which disclaims liability or warranty for this information). If you have questions about a medical condition or this instruction, always ask your healthcare professional. [place] [address] any warranty or liability for your use of this information. Medicare provides coverage of an annual screening mammogram for all female beneficiaries aged 36 and older. Medicare also provides coverage of one baseline screening mammogram for female beneficiaries 28 through 44years of age.     Coverage for Screening Mammography Services     Aged 28 and younger: No payment allowed     Aged 35 through 39 years: Baseline (only one screening allowed for women in this age group)    [employer] Aged 36 and older: Annual (at least 11 months after the last covered breast cancer screening mammogram)    A physicians prescription or referral is not necessary for a screening mammogram to be covered by Medicare. Medicare determines whether to make payment for this procedure based on a womans age and statutory frequency parameters. Need for Additional Films  Medicare allows additional films to be taken without an order from the treating physician. In such situations, a radiologist who interprets a screening mammogram is allowed to order and interpret additional diagnostic films based on the results of the screening mammogram while the beneficiary is still at the facility for the screening exam.  Medicare Wellness Visit, Female     The best way to live healthy is to have a lifestyle where you eat a well-balanced diet, exercise regularly, limit alcohol use, and quit all forms of tobacco/nicotine, if applicable. Regular preventive services are another way to keep healthy. Preventive services (vaccines, screening tests, monitoring & exams) can help personalize your care plan, which helps you manage your own care. Screening tests can find health problems at the earliest stages, when they are easiest to treat. [facility] follows the current, evidence-based guidelines published by the [place] [employer] (USPSTF) when recommending preventive services for our patients. Because we follow these guidelines, sometimes recommendations change over time as research supports it. (For example, mammograms used to be recommended annually. Even though Medicare will still pay for an annual mammogram, the newer guidelines recommend a mammogram every two years for women of average risk).   Of course, you and your doctor may decide to screen more often for some diseases, based on your risk and your co-morbidities (chronic disease you are already diagnosed with). Preventive services for you include:  - Medicare offers their members a free annual wellness visit, which is time for you and your primary care provider to discuss and plan for your preventive service needs. Take advantage of this benefit every year!  -All adults over the age of 72 should receive the recommended pneumonia vaccines. Current USPSTF guidelines recommend a series of two vaccines for the best pneumonia protection.   -All adults should have a flu vaccine yearly and a tetanus vaccine every 10 years.   -All adults age 48 and older should receive the shingles vaccines (series of two vaccines). -All adults age 38-68 who are overweight should have a diabetes screening test once every three years.   -All adults born between [birth year] and 1965 should be screened once for Hepatitis C.  -Other screening tests and preventive services for persons with diabetes include: an eye exam to screen for diabetic retinopathy, a kidney function test, a foot exam, and stricter control over your cholesterol.   -Cardiovascular screening for adults with routine risk involves an electrocardiogram (ECG) at intervals determined by your doctor.   -Colorectal cancer screenings should be done for adults age 54-65 with no increased risk factors for colorectal cancer. There are a number of acceptable methods of screening for this type of cancer. Each test has its own benefits and drawbacks. Discuss with your doctor what is most appropriate for you during your annual wellness visit. The different tests include: colonoscopy (considered the best screening method), a fecal occult blood test, a fecal DNA test, and sigmoidoscopy.    -A bone mass density test is recommended when a woman turns 65 to screen for osteoporosis. This test is only recommended one time, as a screening. Some providers will use this same test as a disease monitoring tool if you already have osteoporosis.   -Breast cancer screenings are recommended every other year for women of normal risk, age 54-69.  -Cervical cancer screenings for women over age 72 are only recommended with certain risk factors. Here is a list of your current Health Maintenance items (your personalized list of preventive services) with a due date:  Health Maintenance Due   Topic Date Due    Colorectal Screening  Never done    Shingles Vaccine (1 of 2) Never done    Diabetic Foot Care  05/18/2018    Bone Mineral Density   Never done    Eye Exam  07/03/2019    Albumin Urine Test  03/06/2020    Mammogram  06/12/2020    Annual Well Visit  08/07/2021    Yearly Flu Vaccine (1) Never done    Hemoglobin A1C    11/06/2021         Vaccine Information Statement    Influenza (Flu) Vaccine (Inactivated or Recombinant): What You Need to Know    Many vaccine information statements are available in [place] and other languages. See www.immunize.org/vis. Hojas de información sobre vacunas están disponibles en español y en muchos otros idiomas. Visite www.immunize.org/vis. 1. Why get vaccinated? Influenza vaccine can prevent influenza (flu). Flu is a contagious disease that spreads around the [place] every year, usually between October and May. Anyone can get the flu, but it is more dangerous for some people. Infants and young children, people 72 years and older, pregnant people, and people with certain health conditions or a weakened immune system are at greatest risk of flu complications. Pneumonia, bronchitis, sinus infections, and ear infections are examples of flu-related complications. If you have a medical condition, such as heart disease, cancer, or diabetes, flu can make it worse. Flu can cause fever and chills, sore throat, muscle aches, fatigue, cough, headache, and runny or stuffy nose. Some people may have vomiting and diarrhea, though this is more common in children than adults.      In an average year, thousands of people in the [facility] die from flu, and many more are hospitalized. Flu vaccine prevents millions of illnesses and flu-related visits to the doctor each year. 2. Influenza vaccines     CDC recommends everyone 6 months and older get vaccinated every flu season. Children 6 months through 6years of age may need 2 doses during a single flu season. Everyone else needs only 1 dose each flu season. It takes about 2 weeks for protection to develop after vaccination. There are many flu viruses, and they are always changing. Each year a new flu vaccine is made to protect against the influenza viruses believed to be likely to cause disease in the upcoming flu season. Even when the vaccine doesnt exactly match these viruses, it may still provide some protection. Influenza vaccine does not cause flu. Influenza vaccine may be given at the same time as other vaccines. 3. Talk with your health care provider    Tell your vaccination provider if the person getting the vaccine:   Has had an allergic reaction after a previous dose of influenza vaccine, or has any severe, life-threatening allergies    Has ever had Guillain-Barré Syndrome (also called GBS)    In some cases, your health care provider may decide to postpone influenza vaccination until a future visit. Influenza vaccine can be administered at any time during pregnancy. People who are or will be pregnant during influenza season should receive inactivated influenza vaccine. People with minor illnesses, such as a cold, may be vaccinated. People who are moderately or severely ill should usually wait until they recover before getting influenza vaccine. Your health care provider can give you more information. 4. Risks of a vaccine reaction     Soreness, redness, and swelling where the shot is given, fever, muscle aches, and headache can happen after influenza vaccination.    There may be a very small increased risk of Guillain-Barré Syndrome (GBS) after inactivated influenza vaccine (the flu shot). Leoma March children who get the flu shot along with pneumococcal vaccine (PCV13) and/or DTaP vaccine at the same time might be slightly more likely to have a seizure caused by fever. Tell your health care provider if a child who is getting flu vaccine has ever had a seizure. People sometimes faint after medical procedures, including vaccination. Tell your provider if you feel dizzy or have vision changes or ringing in the ears. As with any medicine, there is a very remote chance of a vaccine causing a severe allergic reaction, other serious injury, or death. 5. What if there is a serious problem? An allergic reaction could occur after the vaccinated person leaves the clinic. If you see signs of a severe allergic reaction (hives, swelling of the face and throat, difficulty breathing, a fast heartbeat, dizziness, or weakness), call 9-1-1 and get the person to the nearest hospital.    For other signs that concern you, call your health care provider. Adverse reactions should be reported to the Vaccine Adverse Event Reporting System (VAERS). Your health care provider will usually file this report, or you can do it yourself. Visit the VAERS website at www.vaers. hhs.gov or call 3-640.931.5916. VAERS is only for reporting reactions, and VAERS staff members do not give medical advice. 6. The National Vaccine Injury Compensation Program    The [facility] Vaccine Injury Compensation Program (VICP) is a federal program that was created to compensate people who may have been injured by certain vaccines. Claims regarding alleged injury or death due to vaccination have a time limit for filing, which may be as short as two years. Visit the VICP website at www.hrsa.gov/vaccinecompensation or call 1-355.353.1503 to learn about the program and about filing a claim. 7. How can I learn more?  Ask your health care provider.  Call your local or state health department.     Visit the website of the Food and Drug Administration (FDA) for vaccine package inserts and additional information at www.fda.gov/vaccines-blood-biologics/vaccines.  Contact the Centers for Disease Control and Prevention (CDC):  - Call 1-183.660.1303 (1-800-CDC-INFO) or  - Visit CDCs influenza website at www.cdc.gov/flu. Vaccine Information Statement   Inactivated Influenza Vaccine   8/6/2021  42 SAUL Jamison [id number]   Department of Health and Human Services  Centers for Disease Control and Prevention    Office Use Only      Vaccine Information Statement    Recombinant Zoster (Shingles) Vaccine: What You Need to Know    Many Vaccine Information Statements are available in Bengali and other languages. See www.immunize.org/vis  Hojas de información sobre vacunas están disponibles en español y en muchos otros idiomas. Visite www.immunize.org/vis    1. Why get vaccinated? Recombinant zoster (shingles) vaccine can prevent shingles. Shingles (also called herpes zoster, or just zoster) is a painful skin rash, usually with blisters. In addition to the rash, shingles can cause fever, headache, chills, or upset stomach. More rarely, shingles can lead to pneumonia, hearing problems, blindness, brain inflammation (encephalitis), or death. The most common complication of shingles is long-term nerve pain called postherpetic neuralgia (PHN). PHN occurs in the areas where the shingles rash was, even after the rash clears up. It can last for months or years after the rash goes away. The pain from PHN can be severe and debilitating. About 10 to 18% of people who get shingles will experience PHN. The risk of PHN increases with age. An older adult with shingles is more likely to develop PHN and have longer lasting and more severe pain than a younger person with shingles. Shingles is caused by the varicella zoster virus, the same virus that causes chickenpox.  After you have chickenpox, the virus stays in your body and can cause shingles later in life. Shingles cannot be passed from one person to another, but the virus that causes shingles can spread and cause chickenpox in someone who had never had chickenpox or received chickenpox vaccine. 2. Recombinant shingles vaccine    Recombinant shingles vaccine provides strong protection against shingles. By preventing shingles, recombinant shingles vaccine also protects against PHN. Recombinant shingles vaccine is the preferred vaccine for the prevention of shingles. However, a different vaccine, live shingles vaccine, may be used in some circumstances. The recombinant shingles vaccine is recommended for adults 50 years and older without serious immune problems. It is given as a two-dose series. This vaccine is also recommended for people who have already gotten another type of shingles vaccine, the live shingles vaccine. There is no live virus in this vaccine. Shingles vaccine may be given at the same time as other vaccines. 3. Talk with your health care provider    Tell your vaccine provider if the person getting the vaccine:   Has had an allergic reaction after a previous dose of recombinant shingles vaccine, or has any severe, life-threatening allergies.  Is pregnant or breastfeeding.  Is currently experiencing an episode of shingles. In some cases, your health care provider may decide to postpone shingles vaccination to a future visit. People with minor illnesses, such as a cold, may be vaccinated. People who are moderately or severely ill should usually wait until they recover before getting recombinant shingles vaccine. Your health care provider can give you more information. 4. Risks of a vaccine reaction     A sore arm with mild or moderate pain is very common after recombinant shingles vaccine, affecting about 80% of vaccinated people. Redness and swelling can also happen at the site of the injection.    Tiredness, muscle pain, headache, n/a shivering, fever, stomach pain, and nausea happen after vaccination in more than half of people who receive recombinant shingles vaccine. In clinical trials, about 1 out of 6 people who got recombinant zoster vaccine experienced side effects that prevented them from doing regular activities. Symptoms usually went away on their own in 2 to 3 days. You should still get the second dose of recombinant zoster vaccine even if you had one of these reactions after the first dose. People sometimes faint after medical procedures, including vaccination. Tell your provider if you feel dizzy or have vision changes or ringing in the ears. As with any medicine, there is a very remote chance of a vaccine causing a severe allergic reaction, other serious injury, or death. 5. What if there is a serious problem? An allergic reaction could occur after the vaccinated person leaves the clinic. If you see signs of a severe allergic reaction (hives, swelling of the face and throat, difficulty breathing, a fast heartbeat, dizziness, or weakness), call 9-1-1 and get the person to the nearest hospital.    For other signs that concern you, call your health care provider. Adverse reactions should be reported to the Vaccine Adverse Event Reporting System (VAERS). Your health care provider will usually file this report, or you can do it yourself. Visit the VAERS website at www.vaers. hhs.gov or call 9-401.907.5740. VAERS is only for reporting reactions, and VAERS staff do not give medical advice. 6. How can I learn more?  Ask your health care provider.  Call your local or state health department.    Contact the Centers for Disease Control and Prevention (CDC):  - Call 3-862.697.9749 (1-800-CDC-INFO) or  - Visit CDCs website at www.cdc.gov/vaccines    Vaccine Information Statement   Recombinant Zoster Vaccine   10/30/2019    [facility] for Disease Control and Prevention    Office Use Only      Personalized Recommendations for Kaia Hawkins    Here is a list of your current Health Maintenance items that are due (your personalized list of preventive services)     Health Maintenance Due   Topic Date Due    Colorectal Screening  Never done    Shingles Vaccine (1 of 2) Never done    Diabetic Foot Care  05/18/2018    Bone Mineral Density   Never done    Eye Exam  07/03/2019    Albumin Urine Test  03/06/2020    Mammogram  06/12/2020    Yearly Flu Vaccine (1) Never done    Hemoglobin A1C    11/06/2021         Keep a list of your medications (prescribed and over the counter) and bring it to every appointment. Taking your medications as prescribed is important for your health and safety. Use this sample medication list to create your own. Update the list whenever changes are made. Medication Dosage Frequency Indication   Example: Aspirin 81 mg Once daily in the morning Heart Health                                                     How to stay healthy between visits  The best way to live healthy is to have a healthy lifestyle by eating a well-balanced diet, exercising regularly, limiting alcohol and stopping smoking if you are a smoker. Try to exercise at least 30 minutes a day, this is better than any prescription medicine to keep you healthy. Eat at least 5 servings of fruits and vegetables every daily and reduce red meats, processed meat (such as deli cold cuts), white breads and pastas. Limit or eliminate sweets and sugary drinks from your diet. Try to keep your weight healthy. Your body mass index (BMI) should be between 18 and 25. If it is between 22 and 30 you are overweight and if it is 30 or higher, that is called obesity. Being overweight or obese increases risk of high blood pressure, arthritis, sleep apnea, diabetes and many cancers. High blood pressure causes damage to your blood vessels, heart, kidneys and other organs if untreated. Your blood pressure should be under 140/90 with an ideal level of 120/80 or less to prevent heart disease, strokes and kidney disease. Wear your seat belt every time you are in a vehicle. Use sunscreen or cover your skin when you are outdoors. 1 in 61 people will develop melanoma (skin cancer) which is mostly preventable. Smoking makes all health problems worse. If you smoke, talk to your provider about stop-smoking programs and medicines. See a dentist one or two times a year for checkups and to have your teeth cleaned. Annual eye exams are recommended to screen for glaucoma and cataracts. Immunizations - Additional Information  Everyone should have a yearly flu shot and a Tetanus, Diphtheria & Pertussis (TDaP) vaccine every 10 years. The shingles vaccine called Shingrix is recommended once in a lifetime after age 48. This is given as a 2-dose series and you can get it at your local pharmacy. Shingrix is now recommended instead of the older Zostavax as it is 90% effective compared to 50% for the Zostavax. You can get the Shingrix vaccine even if you had the Zostavax as long as one year has passed. All people over age 72 should have a pneumonia vaccine to prevent pneumonia. This is called Pneumovax-23 and is once in a lifetime vaccines except in special cases. Some people may benefit from a different vaccine called Prevnar-13 in addition to 1 [place] Delevan. Screening Tests - Additional Information  Screening for diabetes mellitus with a blood sugar test should be done annually if you have risk factors such as high blood pressure, high cholesterol, are overweight, have a family history of diabetes or you have had high blood sugars in the past, especially during pregnancy (gestational diabetes). Cholesterol tests check for elevated lipids (fatty part of blood) which can lead to heart disease and strokes are recommended every 5 years after age 39.  If you have elevated cholesterol, diabetes or have had a heart attack or stroke you should have testing more frequently. Screening for cervical cancer with a pap smear and pelvic exam is recommended for women every 3-5 years from age 24 until age 72 if previous Pap smears have been normal. If you have had abnormal pap smears or increased risk for cervical cancer then testing may be recommended more frequently. Breast cancer screening with a mammogram is recommended every 2 years for women age 54-69. More frequent screening has not been shown to reduce the risk of dying from breast cancer and increases the chance you might need a biopsy or get treatment for a breast cancer that would not have harmed you. If you are over 75, mammograms may do more harm than good and there have been no studies showing that screening in this age group reduces the chance of death. Screening between age 36 and 48 does reduce the risk for breast cancer death but is not as effective as it is over age 48 and can lead to more biopsies and false positive tests. Colon cancer screening that evaluates for blood or polyps in your colon can prevent colon cancer and should be done yearly as a stool test or every 10 years as a colonoscopy up to age 76. Screening can be done up to age 80 if you are still very healthy but has higher risks after age 76. Colonoscopies may be recommended more frequently if precancerous lesions were found. A bone density test to screen for osteoporosis (thin or brittle bones) should be done at age 72 and periodically after that depending on the results and your history. Medicare will cover this up to every 2 years. Abdominal Aortic Aneurysm (AAA) screening at 72 is recommended if you have a family history of AAA.     Lung Cancer Screening with an annual low-dose CT scan is recommended if you are between age 54 and 68, smoked at least 30 pack years (the equivalent of 1 pack per day for 30 years), and are a current smoker or quit less than 15 years ago.    Hepatitis C screening is recommended one time for everyone between 18-79. HIV and syphilis screening are recommended if you are risk.

## 2023-05-05 RX ORDER — GLIPIZIDE 10 MG/1
TABLET, FILM COATED, EXTENDED RELEASE ORAL
Qty: 120 TABLET | Refills: 3 | Status: SHIPPED | OUTPATIENT
Start: 2023-05-05

## 2023-05-08 ENCOUNTER — OFFICE VISIT (OUTPATIENT)
Facility: CLINIC | Age: 68
End: 2023-05-08
Payer: MEDICARE

## 2023-05-08 ENCOUNTER — HOSPITAL ENCOUNTER (OUTPATIENT)
Facility: HOSPITAL | Age: 68
Setting detail: SPECIMEN
Discharge: HOME | End: 2023-05-11
Payer: MEDICARE

## 2023-05-08 VITALS
HEIGHT: 63 IN | OXYGEN SATURATION: 95 % | RESPIRATION RATE: 16 BRPM | SYSTOLIC BLOOD PRESSURE: 145 MMHG | HEART RATE: 53 BPM | WEIGHT: 230 LBS | DIASTOLIC BLOOD PRESSURE: 58 MMHG | BODY MASS INDEX: 40.75 KG/M2

## 2023-05-08 DIAGNOSIS — E11.22 TYPE 2 DIABETES MELLITUS WITH STAGE 1 CHRONIC KIDNEY DISEASE, WITH LONG-TERM CURRENT USE OF INSULIN (HCC): ICD-10-CM

## 2023-05-08 DIAGNOSIS — E03.9 ACQUIRED HYPOTHYROIDISM: ICD-10-CM

## 2023-05-08 DIAGNOSIS — M25.572 ARTHRALGIA OF LEFT FOOT: ICD-10-CM

## 2023-05-08 DIAGNOSIS — E11.42 DIABETIC POLYNEUROPATHY ASSOCIATED WITH TYPE 2 DIABETES MELLITUS (HCC): ICD-10-CM

## 2023-05-08 DIAGNOSIS — I10 ESSENTIAL (PRIMARY) HYPERTENSION: ICD-10-CM

## 2023-05-08 DIAGNOSIS — E78.2 MIXED HYPERLIPIDEMIA: ICD-10-CM

## 2023-05-08 DIAGNOSIS — E66.01 MORBID (SEVERE) OBESITY DUE TO EXCESS CALORIES (HCC): ICD-10-CM

## 2023-05-08 DIAGNOSIS — N18.1 TYPE 2 DIABETES MELLITUS WITH STAGE 1 CHRONIC KIDNEY DISEASE, WITH LONG-TERM CURRENT USE OF INSULIN (HCC): ICD-10-CM

## 2023-05-08 DIAGNOSIS — Z79.4 TYPE 2 DIABETES MELLITUS WITH STAGE 1 CHRONIC KIDNEY DISEASE, WITH LONG-TERM CURRENT USE OF INSULIN (HCC): ICD-10-CM

## 2023-05-08 DIAGNOSIS — Z01.818 PREOP EXAMINATION: Primary | ICD-10-CM

## 2023-05-08 LAB
ALBUMIN SERPL-MCNC: 3.3 G/DL (ref 3.4–5)
ALBUMIN/GLOB SERPL: 0.9 (ref 0.8–1.7)
ALP SERPL-CCNC: 78 U/L (ref 45–117)
ALT SERPL-CCNC: 14 U/L (ref 13–56)
ANION GAP SERPL CALC-SCNC: 6 MMOL/L (ref 3–18)
AST SERPL-CCNC: 12 U/L (ref 10–38)
BASOPHILS # BLD: 0.1 K/UL (ref 0–0.1)
BASOPHILS NFR BLD: 1 % (ref 0–2)
BILIRUB SERPL-MCNC: 0.6 MG/DL (ref 0.2–1)
BUN SERPL-MCNC: 25 MG/DL (ref 7–18)
BUN/CREAT SERPL: 23 (ref 12–20)
CALCIUM SERPL-MCNC: 8.8 MG/DL (ref 8.5–10.1)
CHLORIDE SERPL-SCNC: 109 MMOL/L (ref 100–111)
CHOLEST SERPL-MCNC: 125 MG/DL
CO2 SERPL-SCNC: 24 MMOL/L (ref 21–32)
CREAT SERPL-MCNC: 1.08 MG/DL (ref 0.6–1.3)
CREAT UR-MCNC: 113 MG/DL (ref 30–125)
DIFFERENTIAL METHOD BLD: ABNORMAL
EOSINOPHIL # BLD: 0.5 K/UL (ref 0–0.4)
EOSINOPHIL NFR BLD: 5 % (ref 0–5)
ERYTHROCYTE [DISTWIDTH] IN BLOOD BY AUTOMATED COUNT: 13.3 % (ref 11.6–14.5)
EST. AVERAGE GLUCOSE BLD GHB EST-MCNC: 151 MG/DL
GLOBULIN SER CALC-MCNC: 3.5 G/DL (ref 2–4)
GLUCOSE SERPL-MCNC: 128 MG/DL (ref 74–99)
HBA1C MFR BLD: 6.9 % (ref 4.2–5.6)
HBA1C MFR BLD: 7.2 %
HCT VFR BLD AUTO: 39.5 % (ref 35–45)
HDLC SERPL-MCNC: 38 MG/DL (ref 40–60)
HDLC SERPL: 3.3 (ref 0–5)
HGB BLD-MCNC: 13 G/DL (ref 12–16)
IMM GRANULOCYTES # BLD AUTO: 0 K/UL (ref 0–0.04)
IMM GRANULOCYTES NFR BLD AUTO: 0 % (ref 0–0.5)
LDLC SERPL CALC-MCNC: 61.8 MG/DL (ref 0–100)
LIPID PANEL: ABNORMAL
LYMPHOCYTES # BLD: 2.3 K/UL (ref 0.9–3.6)
LYMPHOCYTES NFR BLD: 20 % (ref 21–52)
MCH RBC QN AUTO: 28.9 PG (ref 24–34)
MCHC RBC AUTO-ENTMCNC: 32.9 G/DL (ref 31–37)
MCV RBC AUTO: 87.8 FL (ref 78–100)
MICROALBUMIN UR-MCNC: 27.7 MG/DL (ref 0–3)
MICROALBUMIN/CREAT UR-RTO: 245 MG/G (ref 0–30)
MONOCYTES # BLD: 0.8 K/UL (ref 0.05–1.2)
MONOCYTES NFR BLD: 7 % (ref 3–10)
NEUTS SEG # BLD: 7.5 K/UL (ref 1.8–8)
NEUTS SEG NFR BLD: 67 % (ref 40–73)
NRBC # BLD: 0 K/UL (ref 0–0.01)
NRBC BLD-RTO: 0 PER 100 WBC
PLATELET # BLD AUTO: 357 K/UL (ref 135–420)
PMV BLD AUTO: 9.6 FL (ref 9.2–11.8)
POTASSIUM SERPL-SCNC: 4 MMOL/L (ref 3.5–5.5)
PROT SERPL-MCNC: 6.8 G/DL (ref 6.4–8.2)
RBC # BLD AUTO: 4.5 M/UL (ref 4.2–5.3)
SODIUM SERPL-SCNC: 139 MMOL/L (ref 136–145)
TRIGL SERPL-MCNC: 126 MG/DL
TSH SERPL DL<=0.05 MIU/L-ACNC: 0.43 UIU/ML (ref 0.36–3.74)
URATE SERPL-MCNC: 9.6 MG/DL (ref 2.6–7.2)
VLDLC SERPL CALC-MCNC: 25.2 MG/DL
WBC # BLD AUTO: 11.2 K/UL (ref 4.6–13.2)

## 2023-05-08 PROCEDURE — 83036 HEMOGLOBIN GLYCOSYLATED A1C: CPT | Performed by: EMERGENCY MEDICINE

## 2023-05-08 PROCEDURE — 3077F SYST BP >= 140 MM HG: CPT | Performed by: EMERGENCY MEDICINE

## 2023-05-08 PROCEDURE — 80061 LIPID PANEL: CPT

## 2023-05-08 PROCEDURE — 84550 ASSAY OF BLOOD/URIC ACID: CPT

## 2023-05-08 PROCEDURE — 1123F ACP DISCUSS/DSCN MKR DOCD: CPT | Performed by: EMERGENCY MEDICINE

## 2023-05-08 PROCEDURE — 85025 COMPLETE CBC W/AUTO DIFF WBC: CPT

## 2023-05-08 PROCEDURE — 82570 ASSAY OF URINE CREATININE: CPT

## 2023-05-08 PROCEDURE — 80053 COMPREHEN METABOLIC PANEL: CPT

## 2023-05-08 PROCEDURE — 3078F DIAST BP <80 MM HG: CPT | Performed by: EMERGENCY MEDICINE

## 2023-05-08 PROCEDURE — 84443 ASSAY THYROID STIM HORMONE: CPT

## 2023-05-08 PROCEDURE — 83036 HEMOGLOBIN GLYCOSYLATED A1C: CPT

## 2023-05-08 PROCEDURE — 82043 UR ALBUMIN QUANTITATIVE: CPT

## 2023-05-08 PROCEDURE — 99214 OFFICE O/P EST MOD 30 MIN: CPT | Performed by: EMERGENCY MEDICINE

## 2023-05-08 RX ORDER — GABAPENTIN 300 MG/1
CAPSULE ORAL
Qty: 90 CAPSULE | Refills: 0 | Status: SHIPPED | OUTPATIENT
Start: 2023-05-08 | End: 2023-06-07

## 2023-05-08 SDOH — ECONOMIC STABILITY: FOOD INSECURITY: WITHIN THE PAST 12 MONTHS, YOU WORRIED THAT YOUR FOOD WOULD RUN OUT BEFORE YOU GOT MONEY TO BUY MORE.: NEVER TRUE

## 2023-05-08 SDOH — ECONOMIC STABILITY: INCOME INSECURITY: HOW HARD IS IT FOR YOU TO PAY FOR THE VERY BASICS LIKE FOOD, HOUSING, MEDICAL CARE, AND HEATING?: NOT HARD AT ALL

## 2023-05-08 SDOH — ECONOMIC STABILITY: HOUSING INSECURITY
IN THE LAST 12 MONTHS, WAS THERE A TIME WHEN YOU DID NOT HAVE A STEADY PLACE TO SLEEP OR SLEPT IN A SHELTER (INCLUDING NOW)?: NO

## 2023-05-08 SDOH — ECONOMIC STABILITY: FOOD INSECURITY: WITHIN THE PAST 12 MONTHS, THE FOOD YOU BOUGHT JUST DIDN'T LAST AND YOU DIDN'T HAVE MONEY TO GET MORE.: NEVER TRUE

## 2023-05-08 ASSESSMENT — ANXIETY QUESTIONNAIRES
IF YOU CHECKED OFF ANY PROBLEMS ON THIS QUESTIONNAIRE, HOW DIFFICULT HAVE THESE PROBLEMS MADE IT FOR YOU TO DO YOUR WORK, TAKE CARE OF THINGS AT HOME, OR GET ALONG WITH OTHER PEOPLE: NOT DIFFICULT AT ALL
GAD7 TOTAL SCORE: 0
5. BEING SO RESTLESS THAT IT IS HARD TO SIT STILL: 0
2. NOT BEING ABLE TO STOP OR CONTROL WORRYING: 0
6. BECOMING EASILY ANNOYED OR IRRITABLE: 0
7. FEELING AFRAID AS IF SOMETHING AWFUL MIGHT HAPPEN: 0
1. FEELING NERVOUS, ANXIOUS, OR ON EDGE: 0
3. WORRYING TOO MUCH ABOUT DIFFERENT THINGS: 0
4. TROUBLE RELAXING: 0

## 2023-05-08 ASSESSMENT — PATIENT HEALTH QUESTIONNAIRE - PHQ9
SUM OF ALL RESPONSES TO PHQ QUESTIONS 1-9: 0
2. FEELING DOWN, DEPRESSED OR HOPELESS: 0
SUM OF ALL RESPONSES TO PHQ QUESTIONS 1-9: 0
1. LITTLE INTEREST OR PLEASURE IN DOING THINGS: 0
SUM OF ALL RESPONSES TO PHQ9 QUESTIONS 1 & 2: 0

## 2023-05-08 NOTE — PATIENT INSTRUCTIONS
Hyperlipidemia: After Your Visit  Your Care Instructions  Hyperlipidemia is too much fat in your blood. The body has several kinds of fat, including cholesterol and triglycerides. Your body needs fat for many things, such as making new cells. But too much fat in your blood increases your chances of having a heart attack or stroke. You may be able to lower your cholesterol and triglycerides with a heart-healthy diet, exercise, and if needed, medicine. Your doctor may want you to try lifestyle changes first to see whether they lower the fat in your blood. You may need to take medicine if lifestyle changes do not lower the fat in your blood enough. Follow-up care is a key part of your treatment and safety. Be sure to make and go to all appointments, and call your doctor if you are having problems. Its also a good idea to know your test results and keep a list of the medicines you take. How can you care for yourself at home? Take your medicines  Take your medicines exactly as prescribed. Call your doctor if you think you are having a problem with your medicine. If you take medicine to lower your cholesterol, go to follow-up visits. You will need to have blood tests. Do not take large doses of niacin, which is a B vitamin, while taking medicine called statins. It may increase the chance of muscle pain and liver problems. Talk to your doctor about avoiding grapefruit juice if you are taking statins. Grapefruit juice can raise the level of this medicine in your blood. This could increase side effects. Eat more fruits, vegetables, and fiber  Fruits and vegetables have lots of nutrients that help protect against heart disease, and they have little--if any--fat. Try to eat at least five servings a day. Dark green, deep orange, or yellow fruits and vegetables are healthy choices. Keep carrots, celery, and other veggies handy for snacks.  Buy fruit that is in season and store it where you can see it so that you will

## 2023-05-13 RX ORDER — BENAZEPRIL HYDROCHLORIDE 40 MG/1
TABLET, FILM COATED ORAL
Qty: 90 TABLET | Refills: 3 | Status: SHIPPED | OUTPATIENT
Start: 2023-05-13

## 2023-09-18 ENCOUNTER — HOSPITAL ENCOUNTER (OUTPATIENT)
Facility: HOSPITAL | Age: 68
Setting detail: SPECIMEN
Discharge: HOME | End: 2023-09-21
Payer: MEDICARE

## 2023-09-18 ENCOUNTER — OFFICE VISIT (OUTPATIENT)
Facility: CLINIC | Age: 68
End: 2023-09-18
Payer: MEDICARE

## 2023-09-18 ENCOUNTER — TELEPHONE (OUTPATIENT)
Facility: CLINIC | Age: 68
End: 2023-09-18

## 2023-09-18 VITALS
HEART RATE: 50 BPM | OXYGEN SATURATION: 95 % | BODY MASS INDEX: 41.64 KG/M2 | DIASTOLIC BLOOD PRESSURE: 59 MMHG | RESPIRATION RATE: 16 BRPM | SYSTOLIC BLOOD PRESSURE: 150 MMHG | HEIGHT: 63 IN | WEIGHT: 235 LBS

## 2023-09-18 DIAGNOSIS — G89.29 CHRONIC RIGHT SHOULDER PAIN: ICD-10-CM

## 2023-09-18 DIAGNOSIS — N18.1 TYPE 2 DIABETES MELLITUS WITH STAGE 1 CHRONIC KIDNEY DISEASE, WITH LONG-TERM CURRENT USE OF INSULIN (HCC): ICD-10-CM

## 2023-09-18 DIAGNOSIS — M25.572 ARTHRALGIA OF LEFT FOOT: ICD-10-CM

## 2023-09-18 DIAGNOSIS — Z79.4 TYPE 2 DIABETES MELLITUS WITH STAGE 1 CHRONIC KIDNEY DISEASE, WITH LONG-TERM CURRENT USE OF INSULIN (HCC): ICD-10-CM

## 2023-09-18 DIAGNOSIS — E03.9 ACQUIRED HYPOTHYROIDISM: ICD-10-CM

## 2023-09-18 DIAGNOSIS — E78.2 MIXED HYPERLIPIDEMIA: ICD-10-CM

## 2023-09-18 DIAGNOSIS — M25.511 CHRONIC RIGHT SHOULDER PAIN: ICD-10-CM

## 2023-09-18 DIAGNOSIS — Z23 ENCOUNTER FOR IMMUNIZATION: ICD-10-CM

## 2023-09-18 DIAGNOSIS — E66.01 MORBID (SEVERE) OBESITY DUE TO EXCESS CALORIES (HCC): ICD-10-CM

## 2023-09-18 DIAGNOSIS — E11.22 TYPE 2 DIABETES MELLITUS WITH STAGE 1 CHRONIC KIDNEY DISEASE, WITH LONG-TERM CURRENT USE OF INSULIN (HCC): ICD-10-CM

## 2023-09-18 DIAGNOSIS — E11.42 DIABETIC POLYNEUROPATHY ASSOCIATED WITH TYPE 2 DIABETES MELLITUS (HCC): ICD-10-CM

## 2023-09-18 DIAGNOSIS — I10 ESSENTIAL (PRIMARY) HYPERTENSION: Primary | ICD-10-CM

## 2023-09-18 DIAGNOSIS — I10 ESSENTIAL (PRIMARY) HYPERTENSION: ICD-10-CM

## 2023-09-18 LAB
ALBUMIN SERPL-MCNC: 3.4 G/DL (ref 3.4–5)
ALBUMIN/GLOB SERPL: 0.9 (ref 0.8–1.7)
ALP SERPL-CCNC: 71 U/L (ref 45–117)
ALT SERPL-CCNC: 16 U/L (ref 13–56)
ANION GAP SERPL CALC-SCNC: 5 MMOL/L (ref 3–18)
AST SERPL-CCNC: 10 U/L (ref 10–38)
BASOPHILS # BLD: 0.1 K/UL (ref 0–0.1)
BASOPHILS NFR BLD: 1 % (ref 0–2)
BILIRUB SERPL-MCNC: 0.6 MG/DL (ref 0.2–1)
BUN SERPL-MCNC: 25 MG/DL (ref 7–18)
BUN/CREAT SERPL: 23 (ref 12–20)
CALCIUM SERPL-MCNC: 8.9 MG/DL (ref 8.5–10.1)
CHLORIDE SERPL-SCNC: 105 MMOL/L (ref 100–111)
CHOLEST SERPL-MCNC: 159 MG/DL
CO2 SERPL-SCNC: 28 MMOL/L (ref 21–32)
CREAT SERPL-MCNC: 1.08 MG/DL (ref 0.6–1.3)
CREAT UR-MCNC: 182 MG/DL (ref 30–125)
DIFFERENTIAL METHOD BLD: ABNORMAL
EOSINOPHIL # BLD: 0.4 K/UL (ref 0–0.4)
EOSINOPHIL NFR BLD: 3 % (ref 0–5)
ERYTHROCYTE [DISTWIDTH] IN BLOOD BY AUTOMATED COUNT: 13.2 % (ref 11.6–14.5)
EST. AVERAGE GLUCOSE BLD GHB EST-MCNC: 163 MG/DL
GLOBULIN SER CALC-MCNC: 3.7 G/DL (ref 2–4)
GLUCOSE SERPL-MCNC: 229 MG/DL (ref 74–99)
HBA1C MFR BLD: 7.3 % (ref 4.2–5.6)
HCT VFR BLD AUTO: 41.6 % (ref 35–45)
HDLC SERPL-MCNC: 37 MG/DL (ref 40–60)
HDLC SERPL: 4.3 (ref 0–5)
HGB BLD-MCNC: 13.6 G/DL (ref 12–16)
IMM GRANULOCYTES # BLD AUTO: 0 K/UL (ref 0–0.04)
IMM GRANULOCYTES NFR BLD AUTO: 0 % (ref 0–0.5)
LDLC SERPL CALC-MCNC: 75.2 MG/DL (ref 0–100)
LIPID PANEL: ABNORMAL
LYMPHOCYTES # BLD: 2.2 K/UL (ref 0.9–3.6)
LYMPHOCYTES NFR BLD: 19 % (ref 21–52)
MCH RBC QN AUTO: 29.1 PG (ref 24–34)
MCHC RBC AUTO-ENTMCNC: 32.7 G/DL (ref 31–37)
MCV RBC AUTO: 89.1 FL (ref 78–100)
MICROALBUMIN UR-MCNC: 62.6 MG/DL (ref 0–3)
MICROALBUMIN/CREAT UR-RTO: 344 MG/G (ref 0–30)
MONOCYTES # BLD: 0.7 K/UL (ref 0.05–1.2)
MONOCYTES NFR BLD: 6 % (ref 3–10)
NEUTS SEG # BLD: 8.2 K/UL (ref 1.8–8)
NEUTS SEG NFR BLD: 71 % (ref 40–73)
NRBC # BLD: 0 K/UL (ref 0–0.01)
NRBC BLD-RTO: 0 PER 100 WBC
PLATELET # BLD AUTO: 357 K/UL (ref 135–420)
PMV BLD AUTO: 9.7 FL (ref 9.2–11.8)
POTASSIUM SERPL-SCNC: 4.5 MMOL/L (ref 3.5–5.5)
PROT SERPL-MCNC: 7.1 G/DL (ref 6.4–8.2)
RBC # BLD AUTO: 4.67 M/UL (ref 4.2–5.3)
SODIUM SERPL-SCNC: 138 MMOL/L (ref 136–145)
TRIGL SERPL-MCNC: 234 MG/DL
TSH SERPL DL<=0.05 MIU/L-ACNC: 1.02 UIU/ML (ref 0.36–3.74)
VLDLC SERPL CALC-MCNC: 46.8 MG/DL
WBC # BLD AUTO: 11.5 K/UL (ref 4.6–13.2)

## 2023-09-18 PROCEDURE — 3044F HG A1C LEVEL LT 7.0%: CPT | Performed by: EMERGENCY MEDICINE

## 2023-09-18 PROCEDURE — 1036F TOBACCO NON-USER: CPT | Performed by: EMERGENCY MEDICINE

## 2023-09-18 PROCEDURE — 83036 HEMOGLOBIN GLYCOSYLATED A1C: CPT

## 2023-09-18 PROCEDURE — 82043 UR ALBUMIN QUANTITATIVE: CPT

## 2023-09-18 PROCEDURE — 1123F ACP DISCUSS/DSCN MKR DOCD: CPT | Performed by: EMERGENCY MEDICINE

## 2023-09-18 PROCEDURE — G8417 CALC BMI ABV UP PARAM F/U: HCPCS | Performed by: EMERGENCY MEDICINE

## 2023-09-18 PROCEDURE — 82570 ASSAY OF URINE CREATININE: CPT

## 2023-09-18 PROCEDURE — 84443 ASSAY THYROID STIM HORMONE: CPT

## 2023-09-18 PROCEDURE — G8400 PT W/DXA NO RESULTS DOC: HCPCS | Performed by: EMERGENCY MEDICINE

## 2023-09-18 PROCEDURE — 80053 COMPREHEN METABOLIC PANEL: CPT

## 2023-09-18 PROCEDURE — 90674 CCIIV4 VAC NO PRSV 0.5 ML IM: CPT | Performed by: EMERGENCY MEDICINE

## 2023-09-18 PROCEDURE — 99214 OFFICE O/P EST MOD 30 MIN: CPT | Performed by: EMERGENCY MEDICINE

## 2023-09-18 PROCEDURE — 3077F SYST BP >= 140 MM HG: CPT | Performed by: EMERGENCY MEDICINE

## 2023-09-18 PROCEDURE — 1090F PRES/ABSN URINE INCON ASSESS: CPT | Performed by: EMERGENCY MEDICINE

## 2023-09-18 PROCEDURE — G9899 SCRN MAM PERF RSLTS DOC: HCPCS | Performed by: EMERGENCY MEDICINE

## 2023-09-18 PROCEDURE — G0008 ADMIN INFLUENZA VIRUS VAC: HCPCS | Performed by: EMERGENCY MEDICINE

## 2023-09-18 PROCEDURE — G8428 CUR MEDS NOT DOCUMENT: HCPCS | Performed by: EMERGENCY MEDICINE

## 2023-09-18 PROCEDURE — 85025 COMPLETE CBC W/AUTO DIFF WBC: CPT

## 2023-09-18 PROCEDURE — 3017F COLORECTAL CA SCREEN DOC REV: CPT | Performed by: EMERGENCY MEDICINE

## 2023-09-18 PROCEDURE — 80061 LIPID PANEL: CPT

## 2023-09-18 PROCEDURE — 2022F DILAT RTA XM EVC RTNOPTHY: CPT | Performed by: EMERGENCY MEDICINE

## 2023-09-18 PROCEDURE — 3078F DIAST BP <80 MM HG: CPT | Performed by: EMERGENCY MEDICINE

## 2023-09-18 RX ORDER — GABAPENTIN 300 MG/1
CAPSULE ORAL
Qty: 90 CAPSULE | Refills: 0 | Status: SHIPPED | OUTPATIENT
Start: 2023-09-18 | End: 2023-09-22 | Stop reason: SDUPTHER

## 2023-09-18 RX ORDER — CLONIDINE 0.1 MG/24H
1 PATCH, EXTENDED RELEASE TRANSDERMAL
Qty: 4 PATCH | Refills: 5 | Status: SHIPPED | OUTPATIENT
Start: 2023-09-18 | End: 2024-03-04

## 2023-09-18 SDOH — ECONOMIC STABILITY: INCOME INSECURITY: HOW HARD IS IT FOR YOU TO PAY FOR THE VERY BASICS LIKE FOOD, HOUSING, MEDICAL CARE, AND HEATING?: NOT VERY HARD

## 2023-09-18 SDOH — ECONOMIC STABILITY: FOOD INSECURITY: WITHIN THE PAST 12 MONTHS, YOU WORRIED THAT YOUR FOOD WOULD RUN OUT BEFORE YOU GOT MONEY TO BUY MORE.: NEVER TRUE

## 2023-09-18 SDOH — ECONOMIC STABILITY: FOOD INSECURITY: WITHIN THE PAST 12 MONTHS, THE FOOD YOU BOUGHT JUST DIDN'T LAST AND YOU DIDN'T HAVE MONEY TO GET MORE.: NEVER TRUE

## 2023-09-18 ASSESSMENT — ANXIETY QUESTIONNAIRES
3. WORRYING TOO MUCH ABOUT DIFFERENT THINGS: 0
2. NOT BEING ABLE TO STOP OR CONTROL WORRYING: 0
IF YOU CHECKED OFF ANY PROBLEMS ON THIS QUESTIONNAIRE, HOW DIFFICULT HAVE THESE PROBLEMS MADE IT FOR YOU TO DO YOUR WORK, TAKE CARE OF THINGS AT HOME, OR GET ALONG WITH OTHER PEOPLE: NOT DIFFICULT AT ALL
GAD7 TOTAL SCORE: 0
6. BECOMING EASILY ANNOYED OR IRRITABLE: 0
7. FEELING AFRAID AS IF SOMETHING AWFUL MIGHT HAPPEN: 0
4. TROUBLE RELAXING: 0
1. FEELING NERVOUS, ANXIOUS, OR ON EDGE: 0
5. BEING SO RESTLESS THAT IT IS HARD TO SIT STILL: 0

## 2023-09-18 ASSESSMENT — PATIENT HEALTH QUESTIONNAIRE - PHQ9
SUM OF ALL RESPONSES TO PHQ QUESTIONS 1-9: 0
2. FEELING DOWN, DEPRESSED OR HOPELESS: 0
SUM OF ALL RESPONSES TO PHQ QUESTIONS 1-9: 0
SUM OF ALL RESPONSES TO PHQ QUESTIONS 1-9: 0
1. LITTLE INTEREST OR PLEASURE IN DOING THINGS: 0
SUM OF ALL RESPONSES TO PHQ9 QUESTIONS 1 & 2: 0
SUM OF ALL RESPONSES TO PHQ QUESTIONS 1-9: 0

## 2023-09-18 NOTE — TELEPHONE ENCOUNTER
[facility] DR ADAN MIRELES called and is asking for the directions for this medication gabapentin (NEURONTIN) 300 MG capsule.  Please advise      [facility] DR ADAN MIRELES [phone]

## 2023-09-22 DIAGNOSIS — E11.42 DIABETIC POLYNEUROPATHY ASSOCIATED WITH TYPE 2 DIABETES MELLITUS (HCC): ICD-10-CM

## 2023-09-22 RX ORDER — GABAPENTIN 300 MG/1
CAPSULE ORAL
Qty: 90 CAPSULE | Refills: 0 | Status: SHIPPED | OUTPATIENT
Start: 2023-09-22 | End: 2023-09-22 | Stop reason: SDUPTHER

## 2023-09-22 RX ORDER — GABAPENTIN 300 MG/1
CAPSULE ORAL
Qty: 90 CAPSULE | Refills: 0 | Status: SHIPPED | OUTPATIENT
Start: 2023-09-22 | End: 2023-10-22

## 2023-09-27 DIAGNOSIS — E03.9 ACQUIRED HYPOTHYROIDISM: ICD-10-CM

## 2023-09-27 DIAGNOSIS — E11.22 TYPE 2 DIABETES MELLITUS WITH STAGE 1 CHRONIC KIDNEY DISEASE, WITH LONG-TERM CURRENT USE OF INSULIN (HCC): ICD-10-CM

## 2023-09-27 DIAGNOSIS — I10 ESSENTIAL (PRIMARY) HYPERTENSION: Primary | ICD-10-CM

## 2023-09-27 DIAGNOSIS — Z79.4 TYPE 2 DIABETES MELLITUS WITH STAGE 1 CHRONIC KIDNEY DISEASE, WITH LONG-TERM CURRENT USE OF INSULIN (HCC): ICD-10-CM

## 2023-09-27 DIAGNOSIS — N18.1 TYPE 2 DIABETES MELLITUS WITH STAGE 1 CHRONIC KIDNEY DISEASE, WITH LONG-TERM CURRENT USE OF INSULIN (HCC): ICD-10-CM

## 2023-09-27 DIAGNOSIS — R80.9 MICROALBUMINURIA: ICD-10-CM

## 2023-09-27 DIAGNOSIS — E78.2 MIXED HYPERLIPIDEMIA: ICD-10-CM

## 2023-09-27 RX ORDER — BENAZEPRIL HYDROCHLORIDE 40 MG/1
40 TABLET, FILM COATED ORAL DAILY
Qty: 90180 TABLET | Refills: 3 | Status: SHIPPED | OUTPATIENT
Start: 2023-09-27

## 2023-10-05 ENCOUNTER — TELEPHONE (OUTPATIENT)
Facility: CLINIC | Age: 68
End: 2023-10-05

## 2023-10-05 NOTE — TELEPHONE ENCOUNTER
Pharmacy need clarification on Gabapentin instructions need frequency pt should be taking this medication, Yes

## 2023-10-06 DIAGNOSIS — E11.42 DIABETIC POLYNEUROPATHY ASSOCIATED WITH TYPE 2 DIABETES MELLITUS (HCC): ICD-10-CM

## 2023-10-06 RX ORDER — GABAPENTIN 300 MG/1
CAPSULE ORAL
Qty: 90 CAPSULE | Refills: 0 | Status: SHIPPED | OUTPATIENT
Start: 2023-10-06 | End: 2023-11-05

## 2023-11-08 RX ORDER — CHLORTHALIDONE 25 MG/1
25 TABLET ORAL DAILY
Qty: 90 TABLET | Refills: 3 | Status: SHIPPED | OUTPATIENT
Start: 2023-11-08

## 2023-11-08 RX ORDER — ATENOLOL 100 MG/1
100 TABLET ORAL DAILY
Qty: 90 TABLET | Refills: 3 | Status: SHIPPED | OUTPATIENT
Start: 2023-11-08

## 2023-11-24 NOTE — TELEPHONE ENCOUNTER
TC-spoke with pt. Pt requesting two refills. Pt verbalized understanding. Time frame 48-72 hrs. And return back to office on Monday.

## 2023-11-25 RX ORDER — AMLODIPINE BESYLATE 10 MG/1
10 TABLET ORAL DAILY
Qty: 90 TABLET | Refills: 3 | Status: SHIPPED | OUTPATIENT
Start: 2023-11-25

## 2023-11-25 RX ORDER — LEVOTHYROXINE SODIUM 175 UG/1
175 TABLET ORAL DAILY
Qty: 90 TABLET | Refills: 3 | Status: SHIPPED | OUTPATIENT
Start: 2023-11-25 | End: 2024-01-08 | Stop reason: SDUPTHER

## 2023-12-07 RX ORDER — LEVOTHYROXINE SODIUM 175 UG/1
TABLET ORAL
Qty: 30 TABLET | Refills: 0 | OUTPATIENT
Start: 2023-12-07

## 2023-12-07 RX ORDER — AMLODIPINE BESYLATE 10 MG/1
TABLET ORAL
Qty: 90 TABLET | Refills: 0 | OUTPATIENT
Start: 2023-12-07

## 2023-12-13 RX ORDER — SITAGLIPTIN 100 MG/1
100 TABLET, FILM COATED ORAL DAILY
Qty: 90 TABLET | Refills: 3 | Status: SHIPPED | OUTPATIENT
Start: 2023-12-13

## 2024-01-08 ENCOUNTER — TELEPHONE (OUTPATIENT)
Facility: CLINIC | Age: 69
End: 2024-01-08

## 2024-01-08 DIAGNOSIS — E11.22 TYPE 2 DIABETES MELLITUS WITH STAGE 1 CHRONIC KIDNEY DISEASE, WITH LONG-TERM CURRENT USE OF INSULIN (HCC): Primary | ICD-10-CM

## 2024-01-08 DIAGNOSIS — N18.1 TYPE 2 DIABETES MELLITUS WITH STAGE 1 CHRONIC KIDNEY DISEASE, WITH LONG-TERM CURRENT USE OF INSULIN (HCC): Primary | ICD-10-CM

## 2024-01-08 DIAGNOSIS — Z79.4 TYPE 2 DIABETES MELLITUS WITH STAGE 1 CHRONIC KIDNEY DISEASE, WITH LONG-TERM CURRENT USE OF INSULIN (HCC): Primary | ICD-10-CM

## 2024-01-08 DIAGNOSIS — E03.9 ACQUIRED HYPOTHYROIDISM: ICD-10-CM

## 2024-01-08 RX ORDER — GLIPIZIDE 10 MG/1
TABLET, FILM COATED, EXTENDED RELEASE ORAL
Qty: 120 TABLET | Refills: 3 | Status: SHIPPED | OUTPATIENT
Start: 2024-01-08 | End: 2024-01-08 | Stop reason: SDUPTHER

## 2024-01-08 RX ORDER — LEVOTHYROXINE SODIUM 175 UG/1
175 TABLET ORAL DAILY
Qty: 90 TABLET | Refills: 3 | Status: SHIPPED | OUTPATIENT
Start: 2024-01-08

## 2024-01-08 RX ORDER — INSULIN GLARGINE 100 [IU]/ML
INJECTION, SOLUTION SUBCUTANEOUS
Qty: 40 ML | Refills: 3 | Status: SHIPPED | OUTPATIENT
Start: 2024-01-08

## 2024-01-08 RX ORDER — GLIPIZIDE 10 MG/1
20 TABLET, FILM COATED, EXTENDED RELEASE ORAL EVERY MORNING
Qty: 120 TABLET | Refills: 3 | Status: SHIPPED | OUTPATIENT
Start: 2024-01-08

## 2024-01-08 NOTE — TELEPHONE ENCOUNTER
Patient request refill on Med.    Glipizide   Synthroid ( diabetes ) med request     Location [facility]

## 2024-02-11 NOTE — PATIENT INSTRUCTIONS
days of the week. You also may want to swim, bike, or do other activities.     Do not smoke. If you need help quitting, talk to your doctor about stop-smoking programs and medicines. These can increase your chances of quitting for good. Quitting smoking may be the most important step you can take to protect your heart. It is never too late to quit.     Limit alcohol to 2 drinks a day for men and 1 drink a day for women. Too much alcohol can cause health problems.     Manage other health problems such as diabetes, high blood pressure, and high cholesterol. If you think you may have a problem with alcohol or drug use, talk to your doctor.   Medicines    Take your medicines exactly as prescribed. Call your doctor if you think you are having a problem with your medicine.     If your doctor recommends aspirin, take the amount directed each day. Make sure you take aspirin and not another kind of pain reliever, such as acetaminophen (Tylenol).   When should you call for help?   Call 911 if you have symptoms of a heart attack. These may include:    Chest pain or pressure, or a strange feeling in the chest.     Sweating.     Shortness of breath.     Pain, pressure, or a strange feeling in the back, neck, jaw, or upper belly or in one or both shoulders or arms.     Lightheadedness or sudden weakness.     A fast or irregular heartbeat.   After you call 911, the  may tell you to chew 1 adult-strength or 2 to 4 low-dose aspirin. Wait for an ambulance. Do not try to drive yourself.  Watch closely for changes in your health, and be sure to contact your doctor if you have any problems.  Where can you learn more?  Go to https://www.health"[employer].net/patientEd and enter F075 to learn more about \"A Healthy Heart: Care Instructions.\"  Current as of: June 25, 2023               Content Version: 13.9  © 2006-2023 Healthwise[employer].   Care instructions adapted under license by [employer] If you have questions about a

## 2024-02-11 NOTE — PROGRESS NOTES
Medicare Annual Wellness Visit    Maria Elena Hu is here for No chief complaint on file.      Assessment & Plan   Medicare annual wellness visit, subsequent  -     Amb External Referral To Gastroenterology  -     diptheria-tetanus toxoids (TDVAX) 2-2 LF/0.5ML injection; Inject 0.5 mLs into the muscle once for 1 dose, Disp-0.5 mL, R-0Print  -     zoster recombinant adjuvanted vaccine (SHINGRIX) 50 MCG/0.5ML SUSR injection; Inject 0.5 mLs into the muscle once for 1 dose, Disp-0.5 mL, R-0Print  -     DEXA Bone Density Axial Skeleton; Future       Health Maintenance Due   Topic Date Due    Shingles vaccine (1 of 2) Never done    DEXA (modify frequency per FRAX score)  Never done    Respiratory Syncytial Virus (RSV) Pregnant or age 60 yrs+ (1 - 1-dose 60+ series) Never done    DTaP/Tdap/Td vaccine (2 - Td or Tdap) 12/05/2022    COVID-19 Vaccine (4 - 2023-24 season) 09/01/2023    Colorectal Cancer Screen  10/05/2023    Annual Wellness Visit (Medicare Advantage)  Never done   Has a colon cancer test to send back  Declines vaccines  Declines DEXA    Recommendations for Preventive Services Due: see orders and patient instructions/AVS.  Recommended screening schedule for the next 5-10 years is provided to the patient in written form: see Patient Instructions/AVS.     No follow-ups on file.     Subjective   The following acute and/or chronic problems were also addressed today:  Essential hypertension, hyperlipidemia, diabetes mellitus type 2, microalbuminuria, unhealthy weight, hypothyroidism, shoulder pains.    Patient's complete Health Risk Assessment and screening values have been reviewed and are found in Flowsheets. The following problems were reviewed today and where indicated follow up appointments were made and/or referrals ordered.    Positive Risk Factor Screenings with Interventions:                Activity, Diet, and Weight:  On average, how many days per week do you engage in moderate to strenuous exercise (like

## 2024-02-11 NOTE — PROGRESS NOTES
Essential (primary) hypertension  Comments:  Elevated,switch to Catapres .1 BID  Orders:  -     cloNIDine (CATAPRES) 0.1 MG tablet; Take 1 tablet by mouth 2 times daily, Disp-60 tablet, R-3Normal  Mixed hyperlipidemia  Comments:  Previous cholesterol September 2023 159 triglycerides 234 LDL 75.2 primarily hypertriglyceridemia.  Recheck this visit and adjust meds.  Discussed diet.  Type 2 diabetes mellitus with stage 1 chronic kidney disease, with long-term current use of insulin ([facility])  Comments:  Prior hemoglobin A1c September 2023 7.3 microalbumin to creatinine ratio 344 stage III microalbuminuria.  Recheck this visit consider referral if elevated.  Adj  Diabetic polyneuropathy associated with type 2 diabetes mellitus ([facility])  Comments:   There are no new signs or symptoms.  There are no new aggravating or relieving factors.  There are no reported side effects from the medication/treatment.     Morbid (severe) obesity due to excess calories ([facility])  Comments:  We went over diet and exercise.  Download my fitness pal.  Walk 150 minutes a week declines medications  Acquired hypothyroidism  Comments:  Previous TSH September 2023 1.02 in range.  Recheck this visit.  Chronic kidney disease, stage 3a ([facility])  Comments:  Previous creatinine September 2023 1.08 estimated GFR 56 consistent with stage IIIa CKD.  Recheck this visit and attempt mitigate any risk factors  Arthralgia of left foot  Comments:  Continue present treatment.  No change.  Chronic right shoulder pain  Comments:  Previously prescribed Voltaren gel     RESULTS REVIEW:   Hospital Outpatient Visit on 09/18/2023   Component Date Value Ref Range Status    Microalbumin, Random Urine 09/18/2023 62.60 (H)  0 - 3.0 MG/DL Final    Creatinine, Ur 09/18/2023 182.00 (H)  30 - 125 mg/dL Final    Microalbumin Creatinine Ratio 09/18/2023 344 (H)  0 - 30 mg/g Final    Hemoglobin A1C 09/18/2023 7.3 (H)  4.2 - 5.6 % Final    Estimated Avg Glucose 09/18/2023 163  mg/dL Final

## 2024-02-13 ENCOUNTER — HOSPITAL ENCOUNTER (OUTPATIENT)
Facility: HOSPITAL | Age: 69
Setting detail: SPECIMEN
Discharge: HOME | End: 2024-02-16
Payer: MEDICARE

## 2024-02-13 ENCOUNTER — OFFICE VISIT (OUTPATIENT)
Facility: CLINIC | Age: 69
End: 2024-02-13
Payer: MEDICARE

## 2024-02-13 VITALS
TEMPERATURE: 96 F | HEIGHT: 63 IN | WEIGHT: 240 LBS | SYSTOLIC BLOOD PRESSURE: 148 MMHG | BODY MASS INDEX: 42.52 KG/M2 | RESPIRATION RATE: 16 BRPM | OXYGEN SATURATION: 95 % | HEART RATE: 53 BPM | DIASTOLIC BLOOD PRESSURE: 59 MMHG

## 2024-02-13 DIAGNOSIS — N18.1 TYPE 2 DIABETES MELLITUS WITH STAGE 1 CHRONIC KIDNEY DISEASE, WITH LONG-TERM CURRENT USE OF INSULIN (HCC): ICD-10-CM

## 2024-02-13 DIAGNOSIS — E78.2 MIXED HYPERLIPIDEMIA: ICD-10-CM

## 2024-02-13 DIAGNOSIS — M25.511 CHRONIC RIGHT SHOULDER PAIN: ICD-10-CM

## 2024-02-13 DIAGNOSIS — M25.572 ARTHRALGIA OF LEFT FOOT: ICD-10-CM

## 2024-02-13 DIAGNOSIS — Z00.00 MEDICARE ANNUAL WELLNESS VISIT, SUBSEQUENT: Primary | ICD-10-CM

## 2024-02-13 DIAGNOSIS — N18.31 CHRONIC KIDNEY DISEASE, STAGE 3A (HCC): ICD-10-CM

## 2024-02-13 DIAGNOSIS — I10 ESSENTIAL (PRIMARY) HYPERTENSION: ICD-10-CM

## 2024-02-13 DIAGNOSIS — E11.22 TYPE 2 DIABETES MELLITUS WITH STAGE 1 CHRONIC KIDNEY DISEASE, WITH LONG-TERM CURRENT USE OF INSULIN (HCC): ICD-10-CM

## 2024-02-13 DIAGNOSIS — E03.9 ACQUIRED HYPOTHYROIDISM: ICD-10-CM

## 2024-02-13 DIAGNOSIS — E11.42 DIABETIC POLYNEUROPATHY ASSOCIATED WITH TYPE 2 DIABETES MELLITUS (HCC): ICD-10-CM

## 2024-02-13 DIAGNOSIS — E66.01 MORBID (SEVERE) OBESITY DUE TO EXCESS CALORIES (HCC): ICD-10-CM

## 2024-02-13 DIAGNOSIS — Z79.4 TYPE 2 DIABETES MELLITUS WITH STAGE 1 CHRONIC KIDNEY DISEASE, WITH LONG-TERM CURRENT USE OF INSULIN (HCC): ICD-10-CM

## 2024-02-13 DIAGNOSIS — R80.9 MICROALBUMINURIA: ICD-10-CM

## 2024-02-13 DIAGNOSIS — G89.29 CHRONIC RIGHT SHOULDER PAIN: ICD-10-CM

## 2024-02-13 LAB
ALBUMIN SERPL-MCNC: 3.5 G/DL (ref 3.4–5)
ALBUMIN/GLOB SERPL: 1 (ref 0.8–1.7)
ALP SERPL-CCNC: 86 U/L (ref 45–117)
ALT SERPL-CCNC: 11 U/L (ref 13–56)
ANION GAP SERPL CALC-SCNC: 4 MMOL/L (ref 3–18)
AST SERPL-CCNC: 7 U/L (ref 10–38)
BASOPHILS # BLD: 0.1 K/UL (ref 0–0.1)
BASOPHILS NFR BLD: 1 % (ref 0–2)
BILIRUB SERPL-MCNC: 0.6 MG/DL (ref 0.2–1)
BUN SERPL-MCNC: 21 MG/DL (ref 7–18)
BUN/CREAT SERPL: 21 (ref 12–20)
CALCIUM SERPL-MCNC: 9.2 MG/DL (ref 8.5–10.1)
CHLORIDE SERPL-SCNC: 107 MMOL/L (ref 100–111)
CHOLEST SERPL-MCNC: 131 MG/DL
CO2 SERPL-SCNC: 27 MMOL/L (ref 21–32)
CREAT SERPL-MCNC: 1.02 MG/DL (ref 0.6–1.3)
CREAT UR-MCNC: 68 MG/DL (ref 30–125)
DIFFERENTIAL METHOD BLD: ABNORMAL
EOSINOPHIL # BLD: 0.5 K/UL (ref 0–0.4)
EOSINOPHIL NFR BLD: 4 % (ref 0–5)
ERYTHROCYTE [DISTWIDTH] IN BLOOD BY AUTOMATED COUNT: 13.6 % (ref 11.6–14.5)
EST. AVERAGE GLUCOSE BLD GHB EST-MCNC: 157 MG/DL
GLOBULIN SER CALC-MCNC: 3.4 G/DL (ref 2–4)
GLUCOSE SERPL-MCNC: 178 MG/DL (ref 74–99)
HBA1C MFR BLD: 7.1 % (ref 4.2–5.6)
HCT VFR BLD AUTO: 40.1 % (ref 35–45)
HDLC SERPL-MCNC: 44 MG/DL (ref 40–60)
HDLC SERPL: 3 (ref 0–5)
HGB BLD-MCNC: 12.9 G/DL (ref 12–16)
IMM GRANULOCYTES # BLD AUTO: 0.1 K/UL (ref 0–0.04)
IMM GRANULOCYTES NFR BLD AUTO: 0 % (ref 0–0.5)
LDLC SERPL CALC-MCNC: 66.6 MG/DL (ref 0–100)
LIPID PANEL: NORMAL
LYMPHOCYTES # BLD: 2 K/UL (ref 0.9–3.6)
LYMPHOCYTES NFR BLD: 16 % (ref 21–52)
MCH RBC QN AUTO: 29.1 PG (ref 24–34)
MCHC RBC AUTO-ENTMCNC: 32.2 G/DL (ref 31–37)
MCV RBC AUTO: 90.3 FL (ref 78–100)
MICROALBUMIN UR-MCNC: 146 MG/DL (ref 0–3)
MICROALBUMIN/CREAT UR-RTO: 2147 MG/G (ref 0–30)
MONOCYTES # BLD: 0.8 K/UL (ref 0.05–1.2)
MONOCYTES NFR BLD: 7 % (ref 3–10)
NEUTS SEG # BLD: 8.6 K/UL (ref 1.8–8)
NEUTS SEG NFR BLD: 72 % (ref 40–73)
NRBC # BLD: 0 K/UL (ref 0–0.01)
NRBC BLD-RTO: 0 PER 100 WBC
PLATELET # BLD AUTO: 354 K/UL (ref 135–420)
PMV BLD AUTO: 9.9 FL (ref 9.2–11.8)
POTASSIUM SERPL-SCNC: 4.4 MMOL/L (ref 3.5–5.5)
PROT SERPL-MCNC: 6.9 G/DL (ref 6.4–8.2)
RBC # BLD AUTO: 4.44 M/UL (ref 4.2–5.3)
SODIUM SERPL-SCNC: 138 MMOL/L (ref 136–145)
TRIGL SERPL-MCNC: 102 MG/DL
TSH SERPL DL<=0.05 MIU/L-ACNC: 3.97 UIU/ML (ref 0.36–3.74)
VLDLC SERPL CALC-MCNC: 20.4 MG/DL
WBC # BLD AUTO: 12 K/UL (ref 4.6–13.2)

## 2024-02-13 PROCEDURE — G8400 PT W/DXA NO RESULTS DOC: HCPCS | Performed by: EMERGENCY MEDICINE

## 2024-02-13 PROCEDURE — G9899 SCRN MAM PERF RSLTS DOC: HCPCS | Performed by: EMERGENCY MEDICINE

## 2024-02-13 PROCEDURE — 1036F TOBACCO NON-USER: CPT | Performed by: EMERGENCY MEDICINE

## 2024-02-13 PROCEDURE — 83036 HEMOGLOBIN GLYCOSYLATED A1C: CPT

## 2024-02-13 PROCEDURE — 3078F DIAST BP <80 MM HG: CPT | Performed by: EMERGENCY MEDICINE

## 2024-02-13 PROCEDURE — 80061 LIPID PANEL: CPT

## 2024-02-13 PROCEDURE — 85025 COMPLETE CBC W/AUTO DIFF WBC: CPT

## 2024-02-13 PROCEDURE — 1090F PRES/ABSN URINE INCON ASSESS: CPT | Performed by: EMERGENCY MEDICINE

## 2024-02-13 PROCEDURE — G0439 PPPS, SUBSEQ VISIT: HCPCS | Performed by: EMERGENCY MEDICINE

## 2024-02-13 PROCEDURE — G8482 FLU IMMUNIZE ORDER/ADMIN: HCPCS | Performed by: EMERGENCY MEDICINE

## 2024-02-13 PROCEDURE — 2022F DILAT RTA XM EVC RTNOPTHY: CPT | Performed by: EMERGENCY MEDICINE

## 2024-02-13 PROCEDURE — G8427 DOCREV CUR MEDS BY ELIG CLIN: HCPCS | Performed by: EMERGENCY MEDICINE

## 2024-02-13 PROCEDURE — 82570 ASSAY OF URINE CREATININE: CPT

## 2024-02-13 PROCEDURE — 3017F COLORECTAL CA SCREEN DOC REV: CPT | Performed by: EMERGENCY MEDICINE

## 2024-02-13 PROCEDURE — 80053 COMPREHEN METABOLIC PANEL: CPT

## 2024-02-13 PROCEDURE — 84443 ASSAY THYROID STIM HORMONE: CPT

## 2024-02-13 PROCEDURE — 3077F SYST BP >= 140 MM HG: CPT | Performed by: EMERGENCY MEDICINE

## 2024-02-13 PROCEDURE — 99212 OFFICE O/P EST SF 10 MIN: CPT | Performed by: EMERGENCY MEDICINE

## 2024-02-13 PROCEDURE — 36415 COLL VENOUS BLD VENIPUNCTURE: CPT

## 2024-02-13 PROCEDURE — 82043 UR ALBUMIN QUANTITATIVE: CPT

## 2024-02-13 PROCEDURE — 3046F HEMOGLOBIN A1C LEVEL >9.0%: CPT | Performed by: EMERGENCY MEDICINE

## 2024-02-13 PROCEDURE — G8417 CALC BMI ABV UP PARAM F/U: HCPCS | Performed by: EMERGENCY MEDICINE

## 2024-02-13 PROCEDURE — 1123F ACP DISCUSS/DSCN MKR DOCD: CPT | Performed by: EMERGENCY MEDICINE

## 2024-02-13 RX ORDER — CLONIDINE HYDROCHLORIDE 0.1 MG/1
0.1 TABLET ORAL 2 TIMES DAILY
Qty: 60 TABLET | Refills: 3 | Status: SHIPPED | OUTPATIENT
Start: 2024-02-13

## 2024-02-13 RX ORDER — TETANUS AND DIPHTHERIA TOXOIDS ADSORBED 2; 2 [LF]/.5ML; [LF]/.5ML
0.5 INJECTION INTRAMUSCULAR ONCE
Qty: 0.5 ML | Refills: 0 | Status: SHIPPED | OUTPATIENT
Start: 2024-02-13 | End: 2024-02-13

## 2024-02-19 DIAGNOSIS — I10 ESSENTIAL (PRIMARY) HYPERTENSION: ICD-10-CM

## 2024-02-19 DIAGNOSIS — Z79.4 TYPE 2 DIABETES MELLITUS WITH STAGE 1 CHRONIC KIDNEY DISEASE, WITH LONG-TERM CURRENT USE OF INSULIN (HCC): ICD-10-CM

## 2024-02-19 DIAGNOSIS — E03.9 ACQUIRED HYPOTHYROIDISM: ICD-10-CM

## 2024-02-19 DIAGNOSIS — E78.2 MIXED HYPERLIPIDEMIA: ICD-10-CM

## 2024-02-19 DIAGNOSIS — R80.1 PERSISTENT PROTEINURIA: Primary | ICD-10-CM

## 2024-02-19 DIAGNOSIS — N18.1 TYPE 2 DIABETES MELLITUS WITH STAGE 1 CHRONIC KIDNEY DISEASE, WITH LONG-TERM CURRENT USE OF INSULIN (HCC): ICD-10-CM

## 2024-02-19 DIAGNOSIS — E11.22 TYPE 2 DIABETES MELLITUS WITH STAGE 1 CHRONIC KIDNEY DISEASE, WITH LONG-TERM CURRENT USE OF INSULIN (HCC): ICD-10-CM

## 2024-03-08 RX ORDER — PRAVASTATIN SODIUM 40 MG
40 TABLET ORAL NIGHTLY
Qty: 90 TABLET | Refills: 3 | Status: SHIPPED | OUTPATIENT
Start: 2024-03-08

## 2024-03-14 ENCOUNTER — TELEPHONE (OUTPATIENT)
Facility: CLINIC | Age: 69
End: 2024-03-14

## 2024-03-14 NOTE — TELEPHONE ENCOUNTER
TC was made to pt and pt was made aware of lab results pt states \" that she did she nephrology provider and was placed on the medication Farxiga and Sprilactone and also pt states \" that the medication Catapres cause her pulse to below so she has not taken it.

## 2024-04-01 ENCOUNTER — HOSPITAL ENCOUNTER (OUTPATIENT)
Facility: HOSPITAL | Age: 69
Discharge: HOME | End: 2024-04-04

## 2024-04-01 LAB — LABCORP SPECIMEN COLLECTION: NORMAL

## 2024-04-01 PROCEDURE — 99001 SPECIMEN HANDLING PT-LAB: CPT

## 2024-04-03 DIAGNOSIS — E11.42 DIABETIC POLYNEUROPATHY ASSOCIATED WITH TYPE 2 DIABETES MELLITUS (HCC): ICD-10-CM

## 2024-04-04 RX ORDER — GABAPENTIN 300 MG/1
CAPSULE ORAL
Qty: 90 CAPSULE | Refills: 0 | Status: SHIPPED | OUTPATIENT
Start: 2024-04-04 | End: 2024-05-03

## 2024-04-12 ENCOUNTER — HOSPITAL ENCOUNTER (OUTPATIENT)
Facility: HOSPITAL | Age: 69
Discharge: HOME | End: 2024-04-15

## 2024-04-12 PROCEDURE — 99001 SPECIMEN HANDLING PT-LAB: CPT

## 2024-04-16 LAB — LABCORP SPECIMEN COLLECTION: NORMAL

## 2024-05-29 ENCOUNTER — HOSPITAL ENCOUNTER (OUTPATIENT)
Facility: HOSPITAL | Age: 69
Discharge: HOME | End: 2024-06-01

## 2024-05-29 LAB — LABCORP SPECIMEN COLLECTION: NORMAL

## 2024-05-29 PROCEDURE — 99001 SPECIMEN HANDLING PT-LAB: CPT

## 2024-06-03 RX ORDER — INSULIN GLARGINE 100 [IU]/ML
INJECTION, SOLUTION SUBCUTANEOUS
Qty: 40 ML | Refills: 5 | Status: SHIPPED | OUTPATIENT
Start: 2024-06-03

## 2024-07-05 DIAGNOSIS — E11.42 DIABETIC POLYNEUROPATHY ASSOCIATED WITH TYPE 2 DIABETES MELLITUS (HCC): ICD-10-CM

## 2024-07-08 RX ORDER — GABAPENTIN 300 MG/1
CAPSULE ORAL
Qty: 90 CAPSULE | Refills: 0 | Status: SHIPPED | OUTPATIENT
Start: 2024-07-08 | End: 2024-08-07

## 2024-08-12 PROBLEM — R80.1 PERSISTENT PROTEINURIA: Status: ACTIVE | Noted: 2024-03-12

## 2024-08-12 PROBLEM — K20.90 ESOPHAGITIS: Status: ACTIVE | Noted: 2024-08-12

## 2024-08-12 PROBLEM — E11.42 DIABETIC POLYNEUROPATHY ASSOCIATED WITH TYPE 2 DIABETES MELLITUS (HCC): Status: ACTIVE | Noted: 2024-08-12

## 2024-08-12 PROBLEM — N18.31 CHRONIC KIDNEY DISEASE, STAGE 3A (HCC): Status: ACTIVE | Noted: 2022-05-27

## 2024-08-12 PROBLEM — N20.0 NEPHROLITHIASIS: Status: ACTIVE | Noted: 2024-03-12

## 2024-08-12 PROBLEM — I87.2 VENOUS (PERIPHERAL) INSUFFICIENCY: Status: ACTIVE | Noted: 2024-08-12

## 2024-08-12 PROBLEM — G89.29 CHRONIC RIGHT SHOULDER PAIN: Status: ACTIVE | Noted: 2024-08-12

## 2024-08-12 PROBLEM — M25.511 CHRONIC RIGHT SHOULDER PAIN: Status: ACTIVE | Noted: 2024-08-12

## 2024-08-12 NOTE — ASSESSMENT & PLAN NOTE
Uncontrolled, changes made today: Trial of Duloxetine.    Orders:    DULoxetine (CYMBALTA) 20 MG extended release capsule; Take 1 capsule by mouth daily

## 2024-08-12 NOTE — PATIENT INSTRUCTIONS
rolled towel between your elbow and your body for comfort. This will help keep your arm at your side.  Hold one end of the elastic band with the hand of the painful arm.  Start with your forearm across your belly. Slowly rotate the forearm out away from your body. Keep your elbow and upper arm tucked against the towel roll or the side of your body until you begin to feel tightness in your shoulder. Slowly move your arm back to where you started.  Repeat 8 to 12 times.  Internal rotator strengthening exercise    Start by tying a piece of elastic exercise material to a doorknob. You can use surgical tubing or Thera-Band.  Stand or sit with your shoulder relaxed and your elbow bent 90 degrees. Your upper arm should rest comfortably against your side. Squeeze a rolled towel between your elbow and your body for comfort. This will help keep your arm at your side.  Hold one end of the elastic band in the hand of the painful arm.  Slowly rotate your forearm toward your body until it touches your belly. Slowly move it back to where you started.  Keep your elbow and upper arm firmly tucked against the towel roll or at your side.  Repeat 8 to 12 times.  Pendulum swing    If you have pain in your back, do not do this exercise.  Hold on to a table or the back of a chair with your good arm. Then bend forward a little and let your sore arm hang straight down. This exercise does not use the arm muscles. Rather, use your legs and your hips to create movement that makes your arm swing freely.  Use the movement from your hips and legs to guide the slightly swinging arm back and forth like a pendulum (or elephant trunk). Then guide it in circles that start small (about the size of a dinner plate). Make the circles a bit larger each day, as your pain allows.  Do this exercise for 5 minutes, 5 to 7 times each day.  As you have less pain, try bending over a little farther to do this exercise. This will increase the amount of movement at

## 2024-08-12 NOTE — ASSESSMENT & PLAN NOTE
Well-controlled, continue current medications  Hypertension.  Her blood pressure readings were slightly elevated today, with a reading of 146/68 initially and 136/80 upon recheck. She is currently taking multiple antihypertensive medications including Aldactone and atenolol. Her last lab results from 05/2024 showed a urine protein to creatinine ratio of 350, glucose level of 107, creatinine level of 1.09, GFR of 55, and protein bicarb of 19. Her cholesterol levels were satisfactory, but her thyroid levels were slightly elevated, indicating a potential need for increased thyroid hormone dosage. A comprehensive set of labs will be ordered to ensure all parameters are within normal limits. She will continue her current medication regimen.

## 2024-08-12 NOTE — ASSESSMENT & PLAN NOTE
Uncontrolled, changes made today: Previous TSH 3.97 slightly elevated recheck this visit and adjust meds as indicated.

## 2024-08-12 NOTE — ASSESSMENT & PLAN NOTE
Unclear control, continue current medications and continue to monitor for weight loss and improve diet.  Wt Readings from Last 3 Encounters:   08/20/24 102.8 kg (226 lb 9.6 oz)   02/13/24 108.9 kg (240 lb)   09/18/23 106.6 kg (235 lb)

## 2024-08-12 NOTE — ASSESSMENT & PLAN NOTE
Borderline controlled, changes made today: Follow-up labs and adjust medication as needed.  Hemoglobin A1c on last visit was 7.1.  Just above control.    Orders:     DIABETES FOOT EXAM

## 2024-08-12 NOTE — PROGRESS NOTES
Assessment & Plan     Arthritis.  She reports significant joint pain, which has not been alleviated by Tylenol or gabapentin. A low dose of duloxetine will be initiated to manage her joint pain. The potential side effects and the dual role of duloxetine as an antidepressant and pain reliever were discussed.    Low Vitamin D.  She is currently taking vitamin D supplements due to previously low levels. She will continue with the current supplementation.    Heart murmur.  A slight murmur at the apex (1/6) and a second intercostal space murmur were noted. She has a history of a basal systolic murmur during pregnancy. An echocardiogram was done years ago, and no new symptoms suggestive of worsening were reported. No immediate action is required.    Neuropathy.  She takes gabapentin at night for neuropathy pain, which helps with nighttime symptoms. She will continue with the current regimen.    Weight loss.  She has experienced unintentional weight loss, likely due to decreased appetite and another fluid pill. Labs will be rechecked to ensure no underlying issues are contributing to the weight loss.    Health Maintenance.  She is due for a mammogram and a colonoscopy. She has received a Cologuard kit but has not yet completed the test. She will schedule a mammogram and complete the Cologuard test.      Assessment & Plan  Essential (primary) hypertension   Well-controlled, continue current medications  Hypertension.  Her blood pressure readings were slightly elevated today, with a reading of 146/68 initially and 136/80 upon recheck. She is currently taking multiple antihypertensive medications including Aldactone and atenolol. Her last lab results from 05/2024 showed a urine protein to creatinine ratio of 350, glucose level of 107, creatinine level of 1.09, GFR of 55, and protein bicarb of 19. Her cholesterol levels were satisfactory, but her thyroid levels were slightly elevated, indicating a potential need for

## 2024-08-12 NOTE — ASSESSMENT & PLAN NOTE
Well-controlled, continue current medications and improvement in triglycerides noted.  Follow-up.  Recheck this visit

## 2024-08-12 NOTE — ASSESSMENT & PLAN NOTE
Well-controlled, continue current medications and Followed by nephrology. Advised no  NSAIDS

## 2024-08-20 ENCOUNTER — OFFICE VISIT (OUTPATIENT)
Facility: CLINIC | Age: 69
End: 2024-08-20
Payer: MEDICARE

## 2024-08-20 VITALS
WEIGHT: 226.6 LBS | DIASTOLIC BLOOD PRESSURE: 80 MMHG | BODY MASS INDEX: 40.15 KG/M2 | SYSTOLIC BLOOD PRESSURE: 136 MMHG | RESPIRATION RATE: 16 BRPM | HEIGHT: 63 IN | OXYGEN SATURATION: 97 % | HEART RATE: 48 BPM

## 2024-08-20 DIAGNOSIS — Z12.11 SCREENING FOR COLON CANCER: ICD-10-CM

## 2024-08-20 DIAGNOSIS — M25.511 CHRONIC RIGHT SHOULDER PAIN: ICD-10-CM

## 2024-08-20 DIAGNOSIS — S30.861A TICK BITE OF ABDOMINAL WALL, INITIAL ENCOUNTER: ICD-10-CM

## 2024-08-20 DIAGNOSIS — N18.31 CHRONIC KIDNEY DISEASE, STAGE 3A (HCC): ICD-10-CM

## 2024-08-20 DIAGNOSIS — G89.29 CHRONIC RIGHT SHOULDER PAIN: ICD-10-CM

## 2024-08-20 DIAGNOSIS — E03.9 ACQUIRED HYPOTHYROIDISM: ICD-10-CM
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
## 2024-08-20 DIAGNOSIS — Z12.31 ENCOUNTER FOR SCREENING MAMMOGRAM FOR MALIGNANT NEOPLASM OF BREAST: ICD-10-CM

## 2024-08-20 DIAGNOSIS — E11.42 DIABETIC POLYNEUROPATHY ASSOCIATED WITH TYPE 2 DIABETES MELLITUS (HCC): ICD-10-CM

## 2024-08-20 DIAGNOSIS — E66.01 MORBID (SEVERE) OBESITY DUE TO EXCESS CALORIES (HCC): ICD-10-CM

## 2024-08-20 DIAGNOSIS — I10 ESSENTIAL (PRIMARY) HYPERTENSION: Primary | ICD-10-CM

## 2024-08-20 DIAGNOSIS — E78.2 MIXED HYPERLIPIDEMIA: ICD-10-CM

## 2024-08-20 PROCEDURE — G9899 SCRN MAM PERF RSLTS DOC: HCPCS | Performed by: EMERGENCY MEDICINE

## 2024-08-20 PROCEDURE — 1090F PRES/ABSN URINE INCON ASSESS: CPT | Performed by: EMERGENCY MEDICINE

## 2024-08-20 PROCEDURE — 1123F ACP DISCUSS/DSCN MKR DOCD: CPT | Performed by: EMERGENCY MEDICINE

## 2024-08-20 PROCEDURE — 3078F DIAST BP <80 MM HG: CPT | Performed by: EMERGENCY MEDICINE

## 2024-08-20 PROCEDURE — G8417 CALC BMI ABV UP PARAM F/U: HCPCS | Performed by: EMERGENCY MEDICINE

## 2024-08-20 PROCEDURE — 3051F HG A1C>EQUAL 7.0%<8.0%: CPT | Performed by: EMERGENCY MEDICINE

## 2024-08-20 PROCEDURE — 3017F COLORECTAL CA SCREEN DOC REV: CPT | Performed by: EMERGENCY MEDICINE

## 2024-08-20 PROCEDURE — G8427 DOCREV CUR MEDS BY ELIG CLIN: HCPCS | Performed by: EMERGENCY MEDICINE

## 2024-08-20 PROCEDURE — G8400 PT W/DXA NO RESULTS DOC: HCPCS | Performed by: EMERGENCY MEDICINE

## 2024-08-20 PROCEDURE — 3075F SYST BP GE 130 - 139MM HG: CPT | Performed by: EMERGENCY MEDICINE

## 2024-08-20 PROCEDURE — 1036F TOBACCO NON-USER: CPT | Performed by: EMERGENCY MEDICINE

## 2024-08-20 PROCEDURE — 99214 OFFICE O/P EST MOD 30 MIN: CPT | Performed by: EMERGENCY MEDICINE

## 2024-08-20 PROCEDURE — 2022F DILAT RTA XM EVC RTNOPTHY: CPT | Performed by: EMERGENCY MEDICINE

## 2024-08-20 RX ORDER — DULOXETIN HYDROCHLORIDE 20 MG/1
20 CAPSULE, DELAYED RELEASE ORAL DAILY
Qty: 30 CAPSULE | Refills: 3 | Status: SHIPPED | OUTPATIENT
Start: 2024-08-20

## 2024-08-20 ASSESSMENT — ANXIETY QUESTIONNAIRES
4. TROUBLE RELAXING: NOT AT ALL
GAD7 TOTAL SCORE: 3
2. NOT BEING ABLE TO STOP OR CONTROL WORRYING: SEVERAL DAYS
1. FEELING NERVOUS, ANXIOUS, OR ON EDGE: SEVERAL DAYS
6. BECOMING EASILY ANNOYED OR IRRITABLE: NOT AT ALL
7. FEELING AFRAID AS IF SOMETHING AWFUL MIGHT HAPPEN: NOT AT ALL
3. WORRYING TOO MUCH ABOUT DIFFERENT THINGS: SEVERAL DAYS
5. BEING SO RESTLESS THAT IT IS HARD TO SIT STILL: NOT AT ALL

## 2024-08-20 ASSESSMENT — PATIENT HEALTH QUESTIONNAIRE - PHQ9
SUM OF ALL RESPONSES TO PHQ QUESTIONS 1-9: 2
2. FEELING DOWN, DEPRESSED OR HOPELESS: SEVERAL DAYS
SUM OF ALL RESPONSES TO PHQ QUESTIONS 1-9: 2
1. LITTLE INTEREST OR PLEASURE IN DOING THINGS: SEVERAL DAYS
SUM OF ALL RESPONSES TO PHQ9 QUESTIONS 1 & 2: 2

## 2024-08-20 ASSESSMENT — ENCOUNTER SYMPTOMS: BACK PAIN: 1

## 2024-08-22 ENCOUNTER — HOSPITAL ENCOUNTER (OUTPATIENT)
Facility: HOSPITAL | Age: 69
Discharge: HOME | End: 2024-08-25

## 2024-08-22 LAB — LABCORP SPECIMEN COLLECTION: NORMAL

## 2024-08-22 PROCEDURE — 99001 SPECIMEN HANDLING PT-LAB: CPT

## 2024-08-23 ENCOUNTER — TELEPHONE (OUTPATIENT)
Facility: CLINIC | Age: 69
End: 2024-08-23

## 2024-08-23 NOTE — TELEPHONE ENCOUNTER
Please call the patient.  They need a recollection on the Lyme titer because they need a different tube

## 2024-08-23 NOTE — TELEPHONE ENCOUNTER
Gayla with [facility] called she needs clarification on a test -  No test indication for Plasma,    Please call Gayla  At [phone]

## 2024-08-26 LAB — SPECIMEN STATUS REPORT: NORMAL

## 2024-08-27 ENCOUNTER — HOSPITAL ENCOUNTER (OUTPATIENT)
Facility: HOSPITAL | Age: 69
Discharge: HOME | End: 2024-08-30

## 2024-08-27 LAB — LABCORP SPECIMEN COLLECTION: NORMAL

## 2024-08-27 PROCEDURE — 99001 SPECIMEN HANDLING PT-LAB: CPT

## 2024-08-29 LAB
B BURGDOR DNA SPEC QL NAA+PROBE: NEGATIVE
COMMENT: NORMAL
RICK SF IGG TITR SER: NORMAL {TITER}
RICK SF IGM TITR SER: NORMAL {TITER}

## 2024-09-04 LAB
COMMENT: NORMAL
RICK SF IGG TITR SER: NORMAL {TITER}
RICK SF IGM TITR SER: NORMAL {TITER}

## 2024-10-07 DIAGNOSIS — E11.42 DIABETIC POLYNEUROPATHY ASSOCIATED WITH TYPE 2 DIABETES MELLITUS (HCC): ICD-10-CM

## 2024-10-08 RX ORDER — GABAPENTIN 300 MG/1
CAPSULE ORAL
Qty: 90 CAPSULE | Refills: 0 | Status: SHIPPED | OUTPATIENT
Start: 2024-10-08 | End: 2024-11-07

## 2024-11-13 RX ORDER — ATENOLOL 100 MG/1
100 TABLET ORAL DAILY
Qty: 90 TABLET | Refills: 3 | Status: SHIPPED | OUTPATIENT
Start: 2024-11-13

## 2024-11-13 RX ORDER — CHLORTHALIDONE 25 MG/1
25 TABLET ORAL DAILY
Qty: 90 TABLET | Refills: 3 | Status: SHIPPED | OUTPATIENT
Start: 2024-11-13

## 2024-11-22 ENCOUNTER — TELEPHONE (OUTPATIENT)
Facility: CLINIC | Age: 69
End: 2024-11-22

## 2024-11-22 RX ORDER — AMLODIPINE BESYLATE 10 MG/1
10 TABLET ORAL DAILY
Qty: 90 TABLET | Refills: 3 | Status: SHIPPED | OUTPATIENT
Start: 2024-11-22

## 2024-11-28 DIAGNOSIS — R80.9 MICROALBUMINURIA: ICD-10-CM

## 2024-11-29 RX ORDER — BENAZEPRIL HYDROCHLORIDE 40 MG/1
40 TABLET ORAL DAILY
Qty: 90 TABLET | Refills: 3 | Status: SHIPPED | OUTPATIENT
Start: 2024-11-29

## 2024-12-30 DIAGNOSIS — E11.42 DIABETIC POLYNEUROPATHY ASSOCIATED WITH TYPE 2 DIABETES MELLITUS (HCC): ICD-10-CM

## 2025-01-03 ENCOUNTER — TELEPHONE (OUTPATIENT)
Facility: CLINIC | Age: 70
End: 2025-01-03

## 2025-01-03 NOTE — TELEPHONE ENCOUNTER
Pt requesting refill  Gabapentin    [facility]    [facility]    [phone]

## 2025-01-07 RX ORDER — GABAPENTIN 300 MG/1
CAPSULE ORAL
Qty: 90 CAPSULE | Refills: 0 | Status: SHIPPED | OUTPATIENT
Start: 2025-01-07 | End: 2025-02-06

## 2025-01-12 DIAGNOSIS — Z79.4 TYPE 2 DIABETES MELLITUS WITH STAGE 1 CHRONIC KIDNEY DISEASE, WITH LONG-TERM CURRENT USE OF INSULIN (HCC): ICD-10-CM

## 2025-01-12 DIAGNOSIS — E11.22 TYPE 2 DIABETES MELLITUS WITH STAGE 1 CHRONIC KIDNEY DISEASE, WITH LONG-TERM CURRENT USE OF INSULIN (HCC): ICD-10-CM

## 2025-01-12 DIAGNOSIS — N18.1 TYPE 2 DIABETES MELLITUS WITH STAGE 1 CHRONIC KIDNEY DISEASE, WITH LONG-TERM CURRENT USE OF INSULIN (HCC): ICD-10-CM

## 2025-01-15 RX ORDER — SITAGLIPTIN 100 MG/1
100 TABLET, FILM COATED ORAL DAILY
Qty: 90 TABLET | Refills: 3 | Status: SHIPPED | OUTPATIENT
Start: 2025-01-15

## 2025-01-15 RX ORDER — GLIPIZIDE 10 MG/1
TABLET, FILM COATED, EXTENDED RELEASE ORAL
Qty: 120 TABLET | Refills: 3 | Status: SHIPPED | OUTPATIENT
Start: 2025-01-15

## 2025-01-19 NOTE — ASSESSMENT & PLAN NOTE
Chronic problem not in control. We stressed diet and exercise.  No meds at this time.  Wt Readings from Last 3 Encounters:   01/20/25 103.9 kg (229 lb)   08/20/24 102.8 kg (226 lb 9.6 oz)   02/13/24 108.9 kg (240 lb)

## 2025-01-19 NOTE — ASSESSMENT & PLAN NOTE
On Aldactone now also on Norvasc and Tenormin 100 mg daily benazepril 40 mg daily hide hydrocodone 25 mg daily also on Januvia which helps with blood pressure low  BP Readings from Last 3 Encounters:   01/20/25 (!) 150/78   08/20/24 136/80   02/13/24 (!) 148/59

## 2025-01-19 NOTE — ASSESSMENT & PLAN NOTE
Chronic problem mild elevation on last visit.  Update labs and adjust meds if indicated  Lab Results   Component Value Date    TSH 3.97 (H) 02/13/2024

## 2025-01-19 NOTE — ASSESSMENT & PLAN NOTE
Unchanged.  At this point no evidence of intrinsic, inflammatory or nephritic renal disease.  Goal is to manage risk factors for progression.  Lab Results   Component Value Date     02/13/2024    K 4.4 02/13/2024     02/13/2024    CO2 27 02/13/2024    BUN 21 (H) 02/13/2024    CREATININE 1.02 02/13/2024    GLUCOSE 178 (H) 02/13/2024    CALCIUM 9.2 02/13/2024    BILITOT 0.6 02/13/2024    ALKPHOS 86 02/13/2024    AST 7 (L) 02/13/2024    ALT 11 (L) 02/13/2024    LABGLOM 59 (L) 02/13/2024    GFRAA >60 05/23/2022    AGRATIO 1.0 02/07/2023    GLOB 3.4 02/13/2024

## 2025-01-19 NOTE — PROGRESS NOTES
Medicare Annual Wellness Visit    Maria Elena Hu is here for No chief complaint on file.      Assessment & Plan   Medicare annual wellness visit, subsequent  -     respiratory syncytial vaccine, adjuvanted (AREXVY) 120 MCG/0.5ML injection; Inject 0.5 mLs into the muscle once for 1 dose, Disp-0.5 mL, R-0Print  -     zoster recombinant adjuvanted vaccine (SHINGRIX) 50 MCG/0.5ML SUSR injection; Inject 0.5 mLs into the muscle once for 1 dose, Disp-0.5 mL, R-0Print  -     Tdap (ADACEL) 5-2-15.5 LF-MCG/0.5 injection; Inject 0.5 mLs into the muscle once for 1 dose, Disp-0.5 mL, R-0Print  Essential (primary) hypertension  Assessment & Plan:     BP Readings from Last 3 Encounters:   01/20/25 (!) 159/94   08/20/24 136/80   02/13/24 (!) 148/59            Mixed hyperlipidemia  Assessment & Plan:   Previously in good control update labs and adjust meds if indicated.  No change in treatment at this time.  Lab Results   Component Value Date    CHOL 131 02/13/2024    TRIG 102 02/13/2024    HDL 44 02/13/2024    LDL 66.6 02/13/2024    VLDL 20.4 02/13/2024    CHOLHDLRATIO 3.0 02/13/2024            Type 2 diabetes mellitus with stage 1 chronic kidney disease, with long-term current use of insulin ([facility])  Assessment & Plan:   Chronic problem not in control on last visit.  Recheck this visit adjust meds if indicated.  Lab Results   Component Value Date    LABA1C 7.1 (H) 02/13/2024    LABA1C 7.3 (H) 09/18/2023    LABA1C 6.9 (H) 05/08/2023     Lab Results   Component Value Date    CREATININE 1.02 02/13/2024            Chronic kidney disease, stage 3a ([facility])  Assessment & Plan:   Unchanged.  At this point no evidence of intrinsic, inflammatory or nephritic renal disease.  Goal is to manage risk factors for progression.  Lab Results   Component Value Date     02/13/2024    K 4.4 02/13/2024     02/13/2024    CO2 27 02/13/2024    BUN 21 (H) 02/13/2024    CREATININE 1.02 02/13/2024    GLUCOSE 178 (H) 02/13/2024    CALCIUM 9.2 
PNC ANCA.  Repeat ordered.  If positive possible kidney biopsy would be done..   The primary encounter diagnosis was Essential (primary) hypertension. Diagnoses of Mixed hyperlipidemia, Diabetic polyneuropathy associated with type 2 diabetes mellitus ([facility]), Morbid (severe) obesity due to excess calories ([facility]), Acquired hypothyroidism, Chronic kidney disease, stage 3a ([facility]), Chronic right shoulder pain, Encounter for screening mammogram for malignant neoplasm of breast, and Screening for colon cancer were also pertinent to this visit..  The patient was seen in our office on 9/18/2023.  The primary encounter diagnosis was Essential (primary) hypertension. Diagnoses of Mixed hyperlipidemia, Type 2 diabetes mellitus with stage 1 chronic kidney disease, with long-term current use of insulin (HCC), Diabetic polyneuropathy associated with type 2 diabetes mellitus (HCC), Morbid (severe) obesity due to excess calories (HCC), Acquired hypothyroidism, Chronic kidney disease, stage 3a (HCC), Arthralgia of left foot, and Chronic right shoulder pain were also pertinent to this visit...  The patient was seen on 5/8/2023.   The primary encounter diagnosis was Essential (primary) hypertension. Diagnoses of Type 2 diabetes mellitus with stage 1 chronic kidney disease, with long-term current use of insulin (HCC), Mixed hyperlipidemia, Morbid (severe) obesity due to excess calories (HCC), Acquired hypothyroidism, Arthralgia of left foot, and Diabetic polyneuropathy associated with type 2 diabetes mellitus ([facility]) were also pertinent to this visit..  The patient was seen on 2/7/2023 .  This was an office primary care visit.   Previous visit 10/4/2022    The primary encounter diagnosis was Medicare annual wellness visit, subsequent. Diagnoses of Essential hypertension, Type 2 diabetes mellitus with stage 3a chronic kidney disease, with long-term current use of insulin (HCC), Mixed hyperlipidemia, Obesity,  Class III, BMI 40-49.9 (morbid

## 2025-01-19 NOTE — ASSESSMENT & PLAN NOTE
Chronic problem not in control on last visit.  Recheck this visit adjust meds if indicated.  Lab Results   Component Value Date    LABA1C 7.1 (H) 02/13/2024    LABA1C 7.3 (H) 09/18/2023    LABA1C 6.9 (H) 05/08/2023     Lab Results   Component Value Date    CREATININE 1.02 02/13/2024

## 2025-01-19 NOTE — ASSESSMENT & PLAN NOTE
Previously in good control update labs and adjust meds if indicated.  No change in treatment at this time.  Lab Results   Component Value Date    CHOL 131 02/13/2024    TRIG 102 02/13/2024    HDL 44 02/13/2024    LDL 66.6 02/13/2024    VLDL 20.4 02/13/2024    CHOLHDLRATIO 3.0 02/13/2024

## 2025-01-20 ENCOUNTER — OFFICE VISIT (OUTPATIENT)
Facility: CLINIC | Age: 70
End: 2025-01-20

## 2025-01-20 VITALS
HEART RATE: 51 BPM | BODY MASS INDEX: 40.57 KG/M2 | WEIGHT: 229 LBS | OXYGEN SATURATION: 94 % | HEIGHT: 63 IN | SYSTOLIC BLOOD PRESSURE: 150 MMHG | RESPIRATION RATE: 16 BRPM | DIASTOLIC BLOOD PRESSURE: 78 MMHG

## 2025-01-20 DIAGNOSIS — R80.9 MICROALBUMINURIA: ICD-10-CM

## 2025-01-20 DIAGNOSIS — Z12.31 ENCOUNTER FOR SCREENING MAMMOGRAM FOR MALIGNANT NEOPLASM OF BREAST: ICD-10-CM

## 2025-01-20 DIAGNOSIS — Z23 NEED FOR PROPHYLACTIC VACCINATION AND INOCULATION AGAINST VARICELLA: ICD-10-CM

## 2025-01-20 DIAGNOSIS — M25.511 CHRONIC RIGHT SHOULDER PAIN: ICD-10-CM

## 2025-01-20 DIAGNOSIS — G89.29 CHRONIC RIGHT SHOULDER PAIN: ICD-10-CM

## 2025-01-20 DIAGNOSIS — Z23 ENCOUNTER FOR IMMUNIZATION: ICD-10-CM

## 2025-01-20 DIAGNOSIS — Z13.820 ENCOUNTER FOR OSTEOPOROSIS SCREENING IN ASYMPTOMATIC POSTMENOPAUSAL PATIENT: ICD-10-CM

## 2025-01-20 DIAGNOSIS — Z00.00 MEDICARE ANNUAL WELLNESS VISIT, SUBSEQUENT: Primary | ICD-10-CM

## 2025-01-20 DIAGNOSIS — N18.31 CHRONIC KIDNEY DISEASE, STAGE 3A (HCC): ICD-10-CM

## 2025-01-20 DIAGNOSIS — E03.9 ACQUIRED HYPOTHYROIDISM: ICD-10-CM

## 2025-01-20 DIAGNOSIS — Z23 NEED FOR PROPHYLACTIC VACCINATION AGAINST DIPHTHERIA-TETANUS-PERTUSSIS (DTP): ICD-10-CM

## 2025-01-20 DIAGNOSIS — E79.0 ELEVATED URIC ACID IN BLOOD: ICD-10-CM

## 2025-01-20 DIAGNOSIS — E11.22 TYPE 2 DIABETES MELLITUS WITH STAGE 1 CHRONIC KIDNEY DISEASE, WITH LONG-TERM CURRENT USE OF INSULIN (HCC): ICD-10-CM

## 2025-01-20 DIAGNOSIS — E78.2 MIXED HYPERLIPIDEMIA: ICD-10-CM

## 2025-01-20 DIAGNOSIS — Z78.0 ENCOUNTER FOR OSTEOPOROSIS SCREENING IN ASYMPTOMATIC POSTMENOPAUSAL PATIENT: ICD-10-CM

## 2025-01-20 DIAGNOSIS — E66.01 MORBID (SEVERE) OBESITY DUE TO EXCESS CALORIES: ICD-10-CM

## 2025-01-20 DIAGNOSIS — I10 ESSENTIAL (PRIMARY) HYPERTENSION: ICD-10-CM

## 2025-01-20 DIAGNOSIS — Z12.11 SCREENING FOR COLON CANCER: ICD-10-CM

## 2025-01-20 DIAGNOSIS — N18.1 TYPE 2 DIABETES MELLITUS WITH STAGE 1 CHRONIC KIDNEY DISEASE, WITH LONG-TERM CURRENT USE OF INSULIN (HCC): ICD-10-CM

## 2025-01-20 DIAGNOSIS — Z79.4 TYPE 2 DIABETES MELLITUS WITH STAGE 1 CHRONIC KIDNEY DISEASE, WITH LONG-TERM CURRENT USE OF INSULIN (HCC): ICD-10-CM

## 2025-01-20 RX ORDER — RESPIRATORY SYNCYTIAL VISUS VACCINE RECOMBINANT, ADJUVANTED 120MCG/0.5
0.5 KIT INTRAMUSCULAR ONCE
Qty: 0.5 ML | Refills: 0 | Status: SHIPPED | OUTPATIENT
Start: 2025-01-20 | End: 2025-01-20

## 2025-01-20 RX ORDER — ALLOPURINOL 100 MG/1
100 TABLET ORAL DAILY
COMMUNITY

## 2025-01-20 SDOH — ECONOMIC STABILITY: FOOD INSECURITY: WITHIN THE PAST 12 MONTHS, THE FOOD YOU BOUGHT JUST DIDN'T LAST AND YOU DIDN'T HAVE MONEY TO GET MORE.: NEVER TRUE

## 2025-01-20 SDOH — ECONOMIC STABILITY: FOOD INSECURITY: WITHIN THE PAST 12 MONTHS, YOU WORRIED THAT YOUR FOOD WOULD RUN OUT BEFORE YOU GOT MONEY TO BUY MORE.: NEVER TRUE

## 2025-01-20 ASSESSMENT — ENCOUNTER SYMPTOMS
NAUSEA: 0
COLOR CHANGE: 0
EYE PAIN: 0
COUGH: 0
BACK PAIN: 1
SHORTNESS OF BREATH: 0
BLOOD IN STOOL: 0
CONSTIPATION: 0
DIARRHEA: 0
CHEST TIGHTNESS: 0
WHEEZING: 0
ABDOMINAL DISTENTION: 0
ABDOMINAL PAIN: 0

## 2025-01-20 ASSESSMENT — PATIENT HEALTH QUESTIONNAIRE - PHQ9
1. LITTLE INTEREST OR PLEASURE IN DOING THINGS: SEVERAL DAYS
SUM OF ALL RESPONSES TO PHQ QUESTIONS 1-9: 2
SUM OF ALL RESPONSES TO PHQ QUESTIONS 1-9: 2
SUM OF ALL RESPONSES TO PHQ9 QUESTIONS 1 & 2: 2
2. FEELING DOWN, DEPRESSED OR HOPELESS: SEVERAL DAYS
SUM OF ALL RESPONSES TO PHQ QUESTIONS 1-9: 2
SUM OF ALL RESPONSES TO PHQ QUESTIONS 1-9: 2

## 2025-01-20 ASSESSMENT — ANXIETY QUESTIONNAIRES
2. NOT BEING ABLE TO STOP OR CONTROL WORRYING: SEVERAL DAYS
IF YOU CHECKED OFF ANY PROBLEMS ON THIS QUESTIONNAIRE, HOW DIFFICULT HAVE THESE PROBLEMS MADE IT FOR YOU TO DO YOUR WORK, TAKE CARE OF THINGS AT HOME, OR GET ALONG WITH OTHER PEOPLE: NOT DIFFICULT AT ALL
3. WORRYING TOO MUCH ABOUT DIFFERENT THINGS: SEVERAL DAYS
1. FEELING NERVOUS, ANXIOUS, OR ON EDGE: SEVERAL DAYS
5. BEING SO RESTLESS THAT IT IS HARD TO SIT STILL: NOT AT ALL
7. FEELING AFRAID AS IF SOMETHING AWFUL MIGHT HAPPEN: NOT AT ALL
4. TROUBLE RELAXING: NOT AT ALL
6. BECOMING EASILY ANNOYED OR IRRITABLE: NOT AT ALL
GAD7 TOTAL SCORE: 3

## 2025-01-20 ASSESSMENT — LIFESTYLE VARIABLES
HOW OFTEN DO YOU HAVE A DRINK CONTAINING ALCOHOL: NEVER
HOW MANY STANDARD DRINKS CONTAINING ALCOHOL DO YOU HAVE ON A TYPICAL DAY: PATIENT DOES NOT DRINK

## 2025-01-20 NOTE — PATIENT INSTRUCTIONS
information, including forms for your state, see the [employer] website (www.caringinfo.org/planning/advance-directives/).  Follow-up care is a key part of your treatment and safety. Be sure to make and go to all appointments, and call your doctor if you are having problems. It's also a good idea to know your test results and keep a list of the medicines you take.  What should you include in an advance directive?  Many states have a unique advance directive form. (It may ask you to address specific issues.) Or you might use a universal form that's approved by many states.  If your form doesn't tell you what to address, it may be hard to know what to include in your advance directive. Use the questions below to help you get started.  Who do you want to make decisions about your medical care if you are not able to?  What life-support measures do you want if you have a serious illness that gets worse over time or can't be cured?  What are you most afraid of that might happen? (Maybe you're afraid of having pain, losing your independence, or being kept alive by machines.)  Where would you prefer to die? (Your home? A hospital? A nursing home?)  Do you want to donate your organs when you die?  Do you want certain Synagogue practices performed before you die?  When should you call for help?  Be sure to contact your doctor if you have any questions.  Where can you learn more?  Go to https://www.[employer].net/patientEd and enter R264 to learn more about \"Advance Directives: Care Instructions.\"  Current as of: November 16, 2023  Content Version: 14.3  © 2024 [employer].   Care instructions adapted under license by [employer]. If you have questions about a medical condition or this instruction, always ask your healthcare professional. [employer] disclaims any warranty or liability for your use of this information.         A Healthy Heart: Care Instructions  Overview     Coronary artery disease,

## 2025-02-28 DIAGNOSIS — E03.9 ACQUIRED HYPOTHYROIDISM: ICD-10-CM

## 2025-03-03 RX ORDER — INSULIN GLARGINE 100 [IU]/ML
INJECTION, SOLUTION SUBCUTANEOUS
Qty: 40 ML | Refills: 5 | Status: SHIPPED | OUTPATIENT
Start: 2025-03-03

## 2025-03-03 RX ORDER — LEVOTHYROXINE SODIUM 175 UG/1
175 TABLET ORAL DAILY
Qty: 90 TABLET | Refills: 3 | Status: SHIPPED | OUTPATIENT
Start: 2025-03-03

## 2025-03-30 DIAGNOSIS — E11.42 DIABETIC POLYNEUROPATHY ASSOCIATED WITH TYPE 2 DIABETES MELLITUS: ICD-10-CM

## 2025-03-31 RX ORDER — GABAPENTIN 300 MG/1
CAPSULE ORAL
Qty: 90 CAPSULE | Refills: 0 | Status: SHIPPED | OUTPATIENT
Start: 2025-03-31 | End: 2025-04-30

## 2025-03-31 RX ORDER — PRAVASTATIN SODIUM 40 MG
40 TABLET ORAL NIGHTLY
Qty: 90 TABLET | Refills: 0 | Status: SHIPPED | OUTPATIENT
Start: 2025-03-31

## 2025-05-06 ENCOUNTER — TELEPHONE (OUTPATIENT)
Facility: CLINIC | Age: 70
End: 2025-05-06

## 2025-05-06 NOTE — TELEPHONE ENCOUNTER
Confirmed cancelled appointment due to provider in meeting, Pt stated please cancel due to, spouse is on hospic will not leave him, she will call office back at a later time

## 2025-06-02 ENCOUNTER — COMMUNITY OUTREACH (OUTPATIENT)
Facility: CLINIC | Age: 70
End: 2025-06-02

## 2025-06-02 NOTE — PROGRESS NOTES
Patient's HM shows they are overdue for Colorectal Screening.   Care Everywhere and  files searched.  No results to attach to order nor HM updated.      Patient's HM shows they are overdue for A1C.  Care Everywhere and  files searched.  No results to attach to order nor HM updated.

## 2025-07-07 RX ORDER — PRAVASTATIN SODIUM 40 MG
40 TABLET ORAL NIGHTLY
Qty: 90 TABLET | Refills: 3 | Status: SHIPPED | OUTPATIENT
Start: 2025-07-07

## 2025-07-25 ENCOUNTER — TELEPHONE (OUTPATIENT)
Facility: CLINIC | Age: 70
End: 2025-07-25

## 2025-07-25 NOTE — TELEPHONE ENCOUNTER
Had an appointment to see Dr Pablo on 11/02/17 he is going to be out of the office needs to reschedule. Wednesday's do not work for her.   Phone # [phone]   Left vm upcoming appointment

## 2025-07-28 ASSESSMENT — ENCOUNTER SYMPTOMS
ABDOMINAL PAIN: 0
EYE PAIN: 0
NAUSEA: 0
CHEST TIGHTNESS: 0
WHEEZING: 0
CONSTIPATION: 0
SHORTNESS OF BREATH: 0
DIARRHEA: 0
ABDOMINAL DISTENTION: 0
COLOR CHANGE: 0
BLOOD IN STOOL: 0
COUGH: 0
BACK PAIN: 1

## 2025-07-28 NOTE — ASSESSMENT & PLAN NOTE
Chronic problem, previously uncontrolled, will update labs and adjust treatment.  No symptoms of controlled thyroid present.  Lab Results   Component Value Date    TSH 3.97 (H) 02/13/2024       Orders:    TSH; Future

## 2025-07-28 NOTE — ASSESSMENT & PLAN NOTE
Previously well-controlled.  Update labs before next visit and adjust meds as indicated.  Lab Results   Component Value Date    CHOL 131 02/13/2024    TRIG 102 02/13/2024    HDL 44 02/13/2024    LDL 66.6 02/13/2024    VLDL 20.4 02/13/2024    CHOLHDLRATIO 3.0 02/13/2024       Orders:    Lipid Panel; Future

## 2025-07-28 NOTE — PATIENT INSTRUCTIONS
marinara sauce or sauces made from olive oil. Avoid sauces made from cream or mayonnaise.  Choose whole-grain breads, whole wheat pasta and pizza crust, brown rice, beans, and lentils.  Cut back on butter or margarine on bread. Instead, you can dip your bread in a small amount of olive oil.  Ask for a side salad or grilled vegetables instead of french fries or chips.  Where can you learn more?  Go to https://www.[employer].net/patientEd and enter O407 to learn more about \"Learning About the Mediterranean Diet.\"  Current as of: May 9, 2022               Content Version: 13.5  © 2006-2022 [employer].   Care instructions adapted under license by [email or web address]. If you have questions about a medical condition or this instruction, always ask your healthcare professional. [employer] disclaims any warranty or liability for your use of this information.              BMI, Waist Size, and Your Health (01:23)  Your health professional recommends that you watch this short online health video.  Learn how a high BMI and waist size affect your health.  Purpose:  Describes health problems related to high BMI and waist size.  Goal:  The user will learn how a high BMI and waist size affect their health.      How to watch the video     Scan the QR code   OR Visit the website  [email or web address]   Current as of: August 25, 2022               Content Version: 13.5  © 2006-2022 [employer].   Care instructions adapted under license by [email or web address]. If you have questions about a medical condition or this instruction, always ask your healthcare professional. [employer] disclaims any warranty or liability for your use of this information.               Body Mass Index: Care Instructions  Your Care Instructions     Body mass index (BMI) can help you see if your weight is raising your risk for health problems. It uses a formula to compare how much you weigh with how tall you

## 2025-07-28 NOTE — ASSESSMENT & PLAN NOTE
On Aldactone now also on Norvasc and Tenormin 100 mg daily benazepril 40 mg daily also on Januvia which helps with blood pressure loweringthere is a concern about the heart rate being low we will try to add Catapres patch.  The Aldactone was cut in half to 12.5 mg daily by nephrology  BP Readings from Last 3 Encounters:   07/29/25 (!) 162/54   01/20/25 (!) 150/78   08/20/24 136/80     Hypertension Medications       ACE Inhibitors       benazepril (LOTENSIN) 40 MG tablet Take 1 tablet by mouth once daily       Calcium Channel Blockers       amLODIPine (NORVASC) 10 MG tablet Take 1 tablet by mouth once daily       Potassium Sparing Diuretics       spironolactone (ALDACTONE) 25 MG tablet Take 1 tablet by mouth daily       Thiazides and Thiazide-Like Diuretics       chlorthalidone (HYGROTON) 25 MG tablet Take 1 tablet by mouth once daily       Beta Blockers Cardio-Selective       atenolol (TENORMIN) 100 MG tablet Take 1 tablet by mouth once daily           Pulse Readings from Last 3 Encounters:   07/29/25 (!) 47   01/20/25 51   08/20/24 (!) 48        Orders:    Comprehensive Metabolic Panel; Future    CBC with Auto Differential; Future    cloNIDine (CATAPRES-TTS-1) 0.1 MG/24HR PTWK; Place 1 patch onto the skin every 7 days

## 2025-07-29 ENCOUNTER — OFFICE VISIT (OUTPATIENT)
Facility: CLINIC | Age: 70
End: 2025-07-29
Payer: MEDICARE

## 2025-07-29 VITALS
WEIGHT: 216 LBS | RESPIRATION RATE: 18 BRPM | HEIGHT: 63 IN | OXYGEN SATURATION: 95 % | TEMPERATURE: 98.9 F | HEART RATE: 47 BPM | DIASTOLIC BLOOD PRESSURE: 55 MMHG | BODY MASS INDEX: 38.27 KG/M2 | SYSTOLIC BLOOD PRESSURE: 152 MMHG

## 2025-07-29 DIAGNOSIS — N18.31 CHRONIC KIDNEY DISEASE, STAGE 3A (HCC): ICD-10-CM

## 2025-07-29 DIAGNOSIS — Z78.0 ENCOUNTER FOR OSTEOPOROSIS SCREENING IN ASYMPTOMATIC POSTMENOPAUSAL PATIENT: ICD-10-CM

## 2025-07-29 DIAGNOSIS — E11.22 TYPE 2 DIABETES MELLITUS WITH STAGE 1 CHRONIC KIDNEY DISEASE, WITH LONG-TERM CURRENT USE OF INSULIN (HCC): ICD-10-CM

## 2025-07-29 DIAGNOSIS — E78.2 MIXED HYPERLIPIDEMIA: ICD-10-CM

## 2025-07-29 DIAGNOSIS — I10 ESSENTIAL (PRIMARY) HYPERTENSION: Primary | ICD-10-CM

## 2025-07-29 DIAGNOSIS — N18.1 TYPE 2 DIABETES MELLITUS WITH STAGE 1 CHRONIC KIDNEY DISEASE, WITH LONG-TERM CURRENT USE OF INSULIN (HCC): ICD-10-CM

## 2025-07-29 DIAGNOSIS — R01.1 CARDIAC MURMUR: ICD-10-CM

## 2025-07-29 DIAGNOSIS — G89.29 CHRONIC RIGHT SHOULDER PAIN: ICD-10-CM

## 2025-07-29 DIAGNOSIS — Z13.820 ENCOUNTER FOR OSTEOPOROSIS SCREENING IN ASYMPTOMATIC POSTMENOPAUSAL PATIENT: ICD-10-CM

## 2025-07-29 DIAGNOSIS — E66.01 MORBID (SEVERE) OBESITY DUE TO EXCESS CALORIES (HCC): ICD-10-CM

## 2025-07-29 DIAGNOSIS — Z79.4 TYPE 2 DIABETES MELLITUS WITH STAGE 1 CHRONIC KIDNEY DISEASE, WITH LONG-TERM CURRENT USE OF INSULIN (HCC): ICD-10-CM

## 2025-07-29 DIAGNOSIS — E03.9 ACQUIRED HYPOTHYROIDISM: ICD-10-CM

## 2025-07-29 DIAGNOSIS — Z12.31 ENCOUNTER FOR SCREENING MAMMOGRAM FOR MALIGNANT NEOPLASM OF BREAST: ICD-10-CM

## 2025-07-29 DIAGNOSIS — R80.9 MICROALBUMINURIA: ICD-10-CM

## 2025-07-29 DIAGNOSIS — M25.511 CHRONIC RIGHT SHOULDER PAIN: ICD-10-CM

## 2025-07-29 PROCEDURE — 1123F ACP DISCUSS/DSCN MKR DOCD: CPT | Performed by: EMERGENCY MEDICINE

## 2025-07-29 PROCEDURE — 1125F AMNT PAIN NOTED PAIN PRSNT: CPT | Performed by: EMERGENCY MEDICINE

## 2025-07-29 PROCEDURE — 1159F MED LIST DOCD IN RCRD: CPT | Performed by: EMERGENCY MEDICINE

## 2025-07-29 PROCEDURE — 3078F DIAST BP <80 MM HG: CPT | Performed by: EMERGENCY MEDICINE

## 2025-07-29 PROCEDURE — 99214 OFFICE O/P EST MOD 30 MIN: CPT | Performed by: EMERGENCY MEDICINE

## 2025-07-29 PROCEDURE — 3077F SYST BP >= 140 MM HG: CPT | Performed by: EMERGENCY MEDICINE

## 2025-07-29 RX ORDER — SPIRONOLACTONE 25 MG/1
25 TABLET ORAL DAILY
COMMUNITY

## 2025-07-29 RX ORDER — CLONIDINE 0.1 MG/24H
1 PATCH, EXTENDED RELEASE TRANSDERMAL
Qty: 4 PATCH | Refills: 2 | Status: SHIPPED | OUTPATIENT
Start: 2025-07-29 | End: 2026-07-29

## 2025-07-29 RX ORDER — ACYCLOVIR 400 MG/1
TABLET ORAL
Qty: 1 EACH | Refills: 0 | Status: SHIPPED | OUTPATIENT
Start: 2025-07-29

## 2025-07-29 RX ORDER — ACYCLOVIR 400 MG/1
TABLET ORAL
Qty: 2 EACH | Refills: 3 | Status: SHIPPED | OUTPATIENT
Start: 2025-07-29

## 2025-07-29 RX ORDER — DULOXETIN HYDROCHLORIDE 30 MG/1
30 CAPSULE, DELAYED RELEASE ORAL DAILY
Qty: 90 CAPSULE | Refills: 1 | Status: SHIPPED | OUTPATIENT
Start: 2025-07-29

## 2025-07-29 SDOH — ECONOMIC STABILITY: FOOD INSECURITY: WITHIN THE PAST 12 MONTHS, YOU WORRIED THAT YOUR FOOD WOULD RUN OUT BEFORE YOU GOT MONEY TO BUY MORE.: NEVER TRUE

## 2025-07-29 SDOH — ECONOMIC STABILITY: FOOD INSECURITY: WITHIN THE PAST 12 MONTHS, THE FOOD YOU BOUGHT JUST DIDN'T LAST AND YOU DIDN'T HAVE MONEY TO GET MORE.: NEVER TRUE

## 2025-07-29 ASSESSMENT — PATIENT HEALTH QUESTIONNAIRE - PHQ9
2. FEELING DOWN, DEPRESSED OR HOPELESS: SEVERAL DAYS
1. LITTLE INTEREST OR PLEASURE IN DOING THINGS: SEVERAL DAYS
SUM OF ALL RESPONSES TO PHQ QUESTIONS 1-9: 2

## 2025-07-29 NOTE — ASSESSMENT & PLAN NOTE
Unchanged.  At this point no evidence of intrinsic, inflammatory or nephritic renal disease.  Goal is to manage risk factors for progression.  Will continue Lantus 60 units a.m. only.  Glipizide had been decreased from 20 mg to 10 mg by nephrology.  Will discontinue the glipizide for now and see if Jardiance is covered.  She had had some trouble with Farxiga because of yeast but perhaps this lower dose will be tolerated.  She did not tolerate metformin in the past.  If glucose and hemoglobin A1c are not controlled on next visit consider stopping Januvia and switching to a GLP-1.  Renal function panel from June 20, 2025 sodium 141 potassium 5.5 chloride 108 glucose 106 BUN 32 and creatinine was 1.5 with a GFR of 36.1 which would be consistent with stage IIIb chronic kidney disease and a hemoglobin A1c was done on 6/20/2025 which was 7.4 albumin to creatinine ratio was 172 on 6/20/2025 and vitamin D was 62.3  The hemoglobin was 11.6.  Glipizide decreased by nephrology  from 20 mg to 10 mg daily.   Taking the the Lantus 60 in the morning only now, not at night.   Lab Results   Component Value Date     02/13/2024    K 4.4 02/13/2024     02/13/2024    CO2 27 02/13/2024    BUN 21 (H) 02/13/2024    CREATININE 1.02 02/13/2024    GLUCOSE 178 (H) 02/13/2024    CALCIUM 9.2 02/13/2024    BILITOT 0.6 02/13/2024    ALKPHOS 86 02/13/2024    AST 7 (L) 02/13/2024    ALT 11 (L) 02/13/2024    LABGLOM 59 (L) 02/13/2024    GFRAA >60 05/23/2022    AGRATIO 1.0 02/07/2023    GLOB 3.4 02/13/2024     Diabetic Medications       Sulfonylureas       glipiZIDE (GLUCOTROL XL) 10 MG extended release tablet TAKE 2 TABLETS BY MOUTH ONCE DAILY IN THE MORNING       Insulin       insulin glargine (LANTUS) 100 UNIT/ML injection vial INJECT 60 UNITS SUBCUTANEOUSLY TWICE DAILY FOR TYPE 2 DIABETES MELLITUS       Dipeptidyl Peptidase-4 (DPP-4) Inhibitors       SITagliptin (JANUVIA) 100 MG tablet Take 1 tablet by mouth once daily

## 2025-07-29 NOTE — PROGRESS NOTES
Have you been to the ER, urgent care clinic since your last visit?  Hospitalized since your last visit?   NO    Have you seen or consulted any other health care providers outside our system since your last visit?   NO      “Have you had a colorectal cancer screening such as a colonoscopy/FIT/Cologuard?    NO    Date of last Colonoscopy: 2/7/2022  No cologuard on file  Date of last FIT: 2/11/2022   No flexible sigmoidoscopy on file           
Have you been to the ER, urgent care clinic since your last visit?  Hospitalized since your last visit?   NO    Have you seen or consulted any other health care providers outside our system since your last visit?   NO      “Have you had a colorectal cancer screening such as a colonoscopy/FIT/Cologuard?    NO    Date of last Colonoscopy: 2/7/2022  No cologuard on file  Date of last FIT: 2/11/2022   No flexible sigmoidoscopy on file           
   atenolol (TENORMIN) 100 MG tablet Take 1 tablet by mouth once daily    chlorthalidone (HYGROTON) 25 MG tablet Take 1 tablet by mouth once daily    vitamin D (CHOLECALCIFEROL) 125 MCG (5000 UT) CAPS capsule Take 1 capsule by mouth daily    diclofenac sodium (VOLTAREN) 1 % GEL Apply 2 g topically 4 times daily     No current facility-administered medications for this visit.     Subjective     Chief Complaint   Patient presents with    Follow-up             1/20/2025  This was an office primary care visit.  She still  Office visit nephrology 6/26/2025.  CKD stage IV diabetes hypertension    8/20/2024  Primary care visit  Office visit from nephrology 12/4/2024 gout not otherwise specified and hyperkalemia.  Labs were ordered.  The most recent labs were from 11/25/2024 showing a potassium of 5.6 creatinine of 1.14 albumin 3.9 phosphorus 4.2 estimated GFR 51.7.  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  Due to history of nephrolithiasis and use of NSAIDs.  Urine protein study pending.  Low potassium diet advised.  Low purine diet advised.  Low-sodium diet advised.  Avoiding all NSAIDs is recommended.  They will follow-up once the labs are returning.  Víctor reports that her blood pressure readings at home, taken while she is relaxed in bed, are typically around 136/65 or 66. She is currently on five different blood pressure medications, including Aldactone and atenolol, but is not taking clonidine. Her pulse rate is usually in the 40s. She experiences fatigue easily and has lost weight unintentionally, which she attributes to a decrease in appetite and the use of a diuretic medication. She has been advised to limit her intake of potassium-rich foods such as potatoes, bananas, strawberries, and tomatoes due to an increase in her potassium levels since starting Aldactone.     She has not yet completed the Cologuard test or

## 2025-07-29 NOTE — ASSESSMENT & PLAN NOTE
Previously in good control update labs and adjust meds if indicated.  No change in treatment at this time. Was on duloxetine 20 mg daily we will try duloxetine 30 mg daily  Lab Results   Component Value Date    CHOL 131 02/13/2024    TRIG 102 02/13/2024    HDL 44 02/13/2024    LDL 66.6 02/13/2024    VLDL 20.4 02/13/2024    CHOLHDLRATIO 3.0 02/13/2024       Orders:    DULoxetine (CYMBALTA) 30 MG extended release capsule; Take 1 capsule by mouth daily

## 2025-07-29 NOTE — ASSESSMENT & PLAN NOTE
Chronic problem not in control. We stressed diet and exercise.  No meds at this time.  Wt Readings from Last 3 Encounters:   07/29/25 98 kg (216 lb)   01/20/25 103.9 kg (229 lb)   08/20/24 102.8 kg (226 lb 9.6 oz)       Orders:    Comprehensive Metabolic Panel; Future

## 2025-07-30 ENCOUNTER — TELEPHONE (OUTPATIENT)
Facility: CLINIC | Age: 70
End: 2025-07-30

## 2025-08-29 RX ORDER — ATENOLOL 50 MG/1
50 TABLET ORAL DAILY
Qty: 90 TABLET | Refills: 3 | Status: SHIPPED | OUTPATIENT
Start: 2025-08-29

## 2025-08-29 RX ORDER — CLONIDINE HYDROCHLORIDE 0.1 MG/1
0.1 TABLET ORAL 2 TIMES DAILY
Qty: 180 TABLET | Refills: 3 | Status: SHIPPED | OUTPATIENT
Start: 2025-08-29